# Patient Record
Sex: FEMALE | Race: WHITE | Employment: OTHER | URBAN - METROPOLITAN AREA
[De-identification: names, ages, dates, MRNs, and addresses within clinical notes are randomized per-mention and may not be internally consistent; named-entity substitution may affect disease eponyms.]

---

## 2017-01-09 ENCOUNTER — OFFICE VISIT (OUTPATIENT)
Dept: FAMILY MEDICINE CLINIC | Age: 62
End: 2017-01-09

## 2017-01-09 VITALS
HEART RATE: 101 BPM | SYSTOLIC BLOOD PRESSURE: 140 MMHG | OXYGEN SATURATION: 98 % | WEIGHT: 169.4 LBS | RESPIRATION RATE: 16 BRPM | HEIGHT: 61 IN | BODY MASS INDEX: 31.98 KG/M2 | DIASTOLIC BLOOD PRESSURE: 80 MMHG

## 2017-01-09 DIAGNOSIS — I83.90 VARICOSITIES OF LEG: ICD-10-CM

## 2017-01-09 DIAGNOSIS — E11.9 TYPE 2 DIABETES MELLITUS WITHOUT COMPLICATION, UNSPECIFIED LONG TERM INSULIN USE STATUS: Primary | ICD-10-CM

## 2017-01-09 DIAGNOSIS — M79.604 PAIN IN BOTH LOWER EXTREMITIES: ICD-10-CM

## 2017-01-09 DIAGNOSIS — K26.9 DUODENAL ULCER: ICD-10-CM

## 2017-01-09 DIAGNOSIS — M79.605 PAIN IN BOTH LOWER EXTREMITIES: ICD-10-CM

## 2017-01-09 LAB — HBA1C MFR BLD: 6.6 %

## 2017-01-09 PROCEDURE — 99214 OFFICE O/P EST MOD 30 MIN: CPT | Performed by: NURSE PRACTITIONER

## 2017-01-09 PROCEDURE — 83036 HEMOGLOBIN GLYCOSYLATED A1C: CPT | Performed by: NURSE PRACTITIONER

## 2017-01-09 RX ORDER — LANCETS 33 GAUGE
EACH MISCELLANEOUS
Qty: 60 EACH | Refills: 12 | Status: SHIPPED | OUTPATIENT
Start: 2017-01-09 | End: 2017-10-16 | Stop reason: ALTCHOICE

## 2017-01-11 ENCOUNTER — TELEPHONE (OUTPATIENT)
Dept: FAMILY MEDICINE CLINIC | Age: 62
End: 2017-01-11

## 2017-01-11 ENCOUNTER — NURSE ONLY (OUTPATIENT)
Dept: FAMILY MEDICINE CLINIC | Age: 62
End: 2017-01-11

## 2017-01-11 DIAGNOSIS — E11.9 TYPE 2 DIABETES MELLITUS WITHOUT COMPLICATION, UNSPECIFIED LONG TERM INSULIN USE STATUS: Primary | ICD-10-CM

## 2017-01-11 DIAGNOSIS — E11.9 TYPE 2 DIABETES MELLITUS WITHOUT COMPLICATION, UNSPECIFIED LONG TERM INSULIN USE STATUS: ICD-10-CM

## 2017-01-11 LAB
A/G RATIO: 1.7 (ref 1.1–2.2)
ALBUMIN SERPL-MCNC: 3.8 G/DL (ref 3.4–5)
ALP BLD-CCNC: 76 U/L (ref 40–129)
ALT SERPL-CCNC: 16 U/L (ref 10–40)
ANION GAP SERPL CALCULATED.3IONS-SCNC: 12 MMOL/L (ref 3–16)
AST SERPL-CCNC: 17 U/L (ref 15–37)
BILIRUB SERPL-MCNC: 0.3 MG/DL (ref 0–1)
BUN BLDV-MCNC: 14 MG/DL (ref 7–20)
CALCIUM SERPL-MCNC: 9.1 MG/DL (ref 8.3–10.6)
CHLORIDE BLD-SCNC: 103 MMOL/L (ref 99–110)
CHOLESTEROL, TOTAL: 231 MG/DL (ref 0–199)
CO2: 30 MMOL/L (ref 21–32)
CREAT SERPL-MCNC: 0.6 MG/DL (ref 0.6–1.2)
GFR AFRICAN AMERICAN: >60
GFR NON-AFRICAN AMERICAN: >60
GLOBULIN: 2.2 G/DL
GLUCOSE BLD-MCNC: 79 MG/DL (ref 70–99)
HDLC SERPL-MCNC: 56 MG/DL (ref 40–60)
LDL CHOLESTEROL CALCULATED: 151 MG/DL
POTASSIUM SERPL-SCNC: 4.5 MMOL/L (ref 3.5–5.1)
SODIUM BLD-SCNC: 145 MMOL/L (ref 136–145)
TOTAL PROTEIN: 6 G/DL (ref 6.4–8.2)
TRIGL SERPL-MCNC: 119 MG/DL (ref 0–150)
VLDLC SERPL CALC-MCNC: 24 MG/DL

## 2017-01-11 PROCEDURE — 36415 COLL VENOUS BLD VENIPUNCTURE: CPT | Performed by: NURSE PRACTITIONER

## 2017-01-16 ENCOUNTER — TELEPHONE (OUTPATIENT)
Dept: FAMILY MEDICINE CLINIC | Age: 62
End: 2017-01-16

## 2017-01-16 DIAGNOSIS — E78.5 HYPERLIPIDEMIA, UNSPECIFIED HYPERLIPIDEMIA TYPE: ICD-10-CM

## 2017-01-16 DIAGNOSIS — E11.9 TYPE 2 DIABETES MELLITUS WITHOUT COMPLICATION, UNSPECIFIED LONG TERM INSULIN USE STATUS: Primary | ICD-10-CM

## 2017-01-16 RX ORDER — ATORVASTATIN CALCIUM 40 MG/1
40 TABLET, FILM COATED ORAL DAILY
Qty: 30 TABLET | Refills: 2 | Status: SHIPPED | OUTPATIENT
Start: 2017-01-16 | End: 2017-04-17

## 2017-01-17 ENCOUNTER — NURSE ONLY (OUTPATIENT)
Dept: FAMILY MEDICINE CLINIC | Age: 62
End: 2017-01-17

## 2017-01-17 ENCOUNTER — TELEPHONE (OUTPATIENT)
Dept: FAMILY MEDICINE CLINIC | Age: 62
End: 2017-01-17

## 2017-01-17 DIAGNOSIS — E11.9 TYPE 2 DIABETES MELLITUS WITHOUT COMPLICATION, UNSPECIFIED LONG TERM INSULIN USE STATUS: Primary | ICD-10-CM

## 2017-01-17 LAB — GLUCOSE BLD-MCNC: 98 MG/DL

## 2017-01-17 PROCEDURE — 82962 GLUCOSE BLOOD TEST: CPT | Performed by: FAMILY MEDICINE

## 2017-04-17 ENCOUNTER — OFFICE VISIT (OUTPATIENT)
Dept: FAMILY MEDICINE CLINIC | Age: 62
End: 2017-04-17

## 2017-04-17 VITALS
DIASTOLIC BLOOD PRESSURE: 62 MMHG | HEART RATE: 64 BPM | BODY MASS INDEX: 32.28 KG/M2 | HEIGHT: 61 IN | OXYGEN SATURATION: 99 % | RESPIRATION RATE: 16 BRPM | WEIGHT: 171 LBS | SYSTOLIC BLOOD PRESSURE: 122 MMHG

## 2017-04-17 DIAGNOSIS — M79.604 PAIN IN BOTH LOWER EXTREMITIES: ICD-10-CM

## 2017-04-17 DIAGNOSIS — I70.0 AORTIC ATHEROSCLEROSIS (HCC): ICD-10-CM

## 2017-04-17 DIAGNOSIS — M79.605 PAIN IN BOTH LOWER EXTREMITIES: ICD-10-CM

## 2017-04-17 DIAGNOSIS — E11.9 TYPE 2 DIABETES MELLITUS WITHOUT COMPLICATION, UNSPECIFIED LONG TERM INSULIN USE STATUS: Primary | ICD-10-CM

## 2017-04-17 LAB — HBA1C MFR BLD: 6.4 %

## 2017-04-17 PROCEDURE — 83036 HEMOGLOBIN GLYCOSYLATED A1C: CPT | Performed by: NURSE PRACTITIONER

## 2017-04-17 PROCEDURE — 99214 OFFICE O/P EST MOD 30 MIN: CPT | Performed by: NURSE PRACTITIONER

## 2017-04-17 RX ORDER — GABAPENTIN 300 MG/1
900 CAPSULE ORAL NIGHTLY
Qty: 90 CAPSULE | Refills: 3 | Status: SHIPPED | OUTPATIENT
Start: 2017-04-17 | End: 2017-04-17 | Stop reason: SDUPTHER

## 2017-04-17 RX ORDER — GABAPENTIN 300 MG/1
900 CAPSULE ORAL NIGHTLY
Qty: 90 CAPSULE | Refills: 3 | Status: SHIPPED | OUTPATIENT
Start: 2017-04-17 | End: 2017-12-22 | Stop reason: SDUPTHER

## 2017-10-16 ENCOUNTER — OFFICE VISIT (OUTPATIENT)
Dept: FAMILY MEDICINE CLINIC | Age: 62
End: 2017-10-16

## 2017-10-16 VITALS
DIASTOLIC BLOOD PRESSURE: 72 MMHG | WEIGHT: 175.4 LBS | BODY MASS INDEX: 33.12 KG/M2 | RESPIRATION RATE: 12 BRPM | SYSTOLIC BLOOD PRESSURE: 126 MMHG | HEART RATE: 50 BPM | HEIGHT: 61 IN

## 2017-10-16 DIAGNOSIS — E11.9 TYPE 2 DIABETES MELLITUS WITHOUT COMPLICATION, UNSPECIFIED LONG TERM INSULIN USE STATUS: Primary | ICD-10-CM

## 2017-10-16 DIAGNOSIS — E78.5 HYPERLIPIDEMIA, UNSPECIFIED HYPERLIPIDEMIA TYPE: ICD-10-CM

## 2017-10-16 DIAGNOSIS — I70.0 AORTIC ATHEROSCLEROSIS (HCC): ICD-10-CM

## 2017-10-16 LAB
CREATININE URINE: 20.9 MG/DL (ref 28–259)
HBA1C MFR BLD: 6.2 %
MICROALBUMIN UR-MCNC: <1.2 MG/DL
MICROALBUMIN/CREAT UR-RTO: ABNORMAL MG/G (ref 0–30)

## 2017-10-16 PROCEDURE — 99213 OFFICE O/P EST LOW 20 MIN: CPT | Performed by: NURSE PRACTITIONER

## 2017-10-16 PROCEDURE — 83036 HEMOGLOBIN GLYCOSYLATED A1C: CPT | Performed by: NURSE PRACTITIONER

## 2017-10-16 RX ORDER — ROSUVASTATIN CALCIUM 5 MG/1
5 TABLET, COATED ORAL DAILY
Qty: 30 TABLET | Refills: 5 | Status: SHIPPED | OUTPATIENT
Start: 2017-10-16 | End: 2018-04-11 | Stop reason: SDUPTHER

## 2017-10-16 ASSESSMENT — PATIENT HEALTH QUESTIONNAIRE - PHQ9
SUM OF ALL RESPONSES TO PHQ QUESTIONS 1-9: 1
1. LITTLE INTEREST OR PLEASURE IN DOING THINGS: 0
SUM OF ALL RESPONSES TO PHQ9 QUESTIONS 1 & 2: 1
2. FEELING DOWN, DEPRESSED OR HOPELESS: 1

## 2017-10-16 NOTE — PROGRESS NOTES
Nichole Oconnor  58 y.o. female    1955      CC: Diabetes follow up    Chief Complaint   Patient presents with    Diabetes     ROUTINE DM FOLLOW UP, MICRO, FOOT, A1C, AND [facility] TODAY. HPI     Diabetes Mellitus Type II: Current symptoms/problems include numbness and tingling in hands - none in feet. This continues. Worse with hands are elevated. 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 No worse than before. Not any better. Does better if shakes the hands. Legs hurt all the time. Inside legs is where her pain is through the thigh, as well. Gabapentin and tylenol seems to help the best for the leg pain 1 in am and 1 in early evening and one at night. Cramps and knots, pain in inner thigh. Pain worse through the day. Cold is making it worse. Has tried compression sleeves - does not stay on well. Heat helps.       Has some back pain, but not as consistent as the leg pain. Is worse if overdoes it.        Medication compliance: compliant all of the time - using Basaglar 20 U nightly and rare use of humalog (once or twice)  only if dinner out has to use humalog.  Only had to use once this week - uses when eats out. Uses when has a high number. Lantus was not covered, so switched to the basaglar and doing well.         Diabetic diet compliance: compliant most of the time, rare treats   Weight trend: up 4 lbs. Current exercise: No routine exercise, but always moving. Stays active.   [facility] blood sugar records:  fasting in am.  Only one in 200s - after a dessert - not unexpected. Any episodes of hypoglycemia? no  Eye exam current (within one year): July 2016 - due for one this year. Known diabetic complications: neuropathy      Blood pressure typically not checked outside of the office. Had high numbers today. Patient denies chest pain, shortness of breath. No swelling.       Medication side effects: no medication side effects noted. Ulcer has healed in the stomach.   Has to watch with roughage. Can irritate the ulcerated area.        Hyperlipidemia- past  - not on statin. Has Aortic Atherosclerosis. Lipitor upset her stomach - has not been on anything else. Lab Results   Component Value Date    LABA1C 6.2 10/16/2017    LABA1C 6.4 04/17/2017    LABA1C 6.6 01/09/2017     Lab Results   Component Value Date    LABMICR <1.20 03/21/2016    CREATININE 0.6 01/11/2017     Lab Results   Component Value Date    ALT 16 01/11/2017    AST 17 01/11/2017     No components found for: CHLPL  Lab Results   Component Value Date    TRIG 119 01/11/2017     Lab Results   Component Value Date    HDL 56 01/11/2017     Lab Results   Component Value Date    LDLCALC 151 01/11/2017       No Known Allergies    Physical  Examination    Physical Exam   Constitutional: She is oriented to person, place, and time and well-developed, well-nourished, and in no distress. No distress. HENT:   Head: Normocephalic. Right Ear: External ear normal.   Left Ear: External ear normal.   Nose: Nose normal.   Mouth/Throat: Oropharynx is clear and moist.   Cardiovascular: Normal rate and regular rhythm. Exam reveals no gallop and no friction rub. No murmur heard. Pulmonary/Chest: Effort normal. No respiratory distress. She has no wheezes. She has no rales. Left > right leg swelling. Bilateral pitting edema. Musculoskeletal: She exhibits tenderness (medial thighs with pain). Lymphadenopathy:        Head (right side): No submental, no submandibular and no tonsillar adenopathy present. Head (left side): No submental, no submandibular and no tonsillar adenopathy present. She has no cervical adenopathy. Neurological: She is alert and oriented to person, place, and time. Skin: Skin is warm and dry. She is not diaphoretic. Spider like veins and patches of veins in the legs that are tender to touch over the upper legs and medial thighs.     Psychiatric: Mood and affect normal.   Nursing note and vitals reviewed. Vitals:    10/16/17 1112   BP: 126/72   Site: Left Arm   Position: Sitting   Cuff Size: Medium Adult   Pulse: 50   Resp: 12   Weight: 175 lb 6.4 oz (79.6 kg)   Height: 5' 1\" (1.549 m)     Body mass index is 33.14 kg/m². Wt Readings from Last 3 Encounters:   10/16/17 175 lb 6.4 oz (79.6 kg)   04/17/17 171 lb (77.6 kg)   01/09/17 169 lb 6.4 oz (76.8 kg)     BP Readings from Last 3 Encounters:   10/16/17 126/72   04/17/17 122/62   01/09/17 140/80        Results for POC orders placed in visit on 10/16/17   POCT glycosylated hemoglobin (Hb A1C)   Result Value Ref Range    Hemoglobin A1C 6.2 %       Assessment    1. Type 2 diabetes mellitus without complication, unspecified long term insulin use status (HCC)  POCT glycosylated hemoglobin (Hb A1C)    HM DIABETES FOOT EXAM    Microalbumin / Creatinine Urine Ratio    rosuvastatin (CRESTOR) 5 MG tablet   2. Hyperlipidemia, unspecified hyperlipidemia type  rosuvastatin (CRESTOR) 5 MG tablet   3. Aortic atherosclerosis (HCC)  rosuvastatin (CRESTOR) 5 MG tablet         Plan    Continue meds at current dosing  Discussed starting a statin due to atherosclerosis, and diabetes  Start Crestor 5 mg. She had GI side effects with Lipitor  Discussed may try a Livalo as low-dose GI side effects but that is branded    Return in about 6 months (around 4/16/2018) for Diabetes. There are no Patient Instructions on file for this visit.

## 2017-12-22 DIAGNOSIS — M79.605 PAIN IN BOTH LOWER EXTREMITIES: ICD-10-CM

## 2017-12-22 DIAGNOSIS — M79.604 PAIN IN BOTH LOWER EXTREMITIES: ICD-10-CM

## 2017-12-22 RX ORDER — GABAPENTIN 300 MG/1
CAPSULE ORAL
Qty: 90 CAPSULE | Refills: 2 | Status: SHIPPED | OUTPATIENT
Start: 2017-12-22 | End: 2018-03-26 | Stop reason: SDUPTHER

## 2018-03-26 DIAGNOSIS — M79.604 PAIN IN BOTH LOWER EXTREMITIES: ICD-10-CM

## 2018-03-26 DIAGNOSIS — M79.605 PAIN IN BOTH LOWER EXTREMITIES: ICD-10-CM

## 2018-03-28 RX ORDER — GABAPENTIN 300 MG/1
CAPSULE ORAL
Qty: 90 CAPSULE | Refills: 1 | Status: SHIPPED | OUTPATIENT
Start: 2018-03-28 | End: 2018-05-29 | Stop reason: SDUPTHER

## 2018-04-11 DIAGNOSIS — E11.9 TYPE 2 DIABETES MELLITUS WITHOUT COMPLICATION, UNSPECIFIED LONG TERM INSULIN USE STATUS: ICD-10-CM

## 2018-04-11 DIAGNOSIS — E78.5 HYPERLIPIDEMIA, UNSPECIFIED HYPERLIPIDEMIA TYPE: ICD-10-CM

## 2018-04-11 DIAGNOSIS — I70.0 AORTIC ATHEROSCLEROSIS (HCC): ICD-10-CM

## 2018-04-12 RX ORDER — ROSUVASTATIN CALCIUM 5 MG/1
TABLET, COATED ORAL
Qty: 30 TABLET | Refills: 4 | Status: SHIPPED | OUTPATIENT
Start: 2018-04-12 | End: 2018-09-08 | Stop reason: SDUPTHER

## 2018-04-16 ENCOUNTER — OFFICE VISIT (OUTPATIENT)
Dept: FAMILY MEDICINE CLINIC | Age: 63
End: 2018-04-16

## 2018-04-16 VITALS
RESPIRATION RATE: 14 BRPM | HEIGHT: 61 IN | SYSTOLIC BLOOD PRESSURE: 128 MMHG | HEART RATE: 80 BPM | WEIGHT: 179.8 LBS | DIASTOLIC BLOOD PRESSURE: 76 MMHG | BODY MASS INDEX: 33.95 KG/M2

## 2018-04-16 DIAGNOSIS — E11.9 TYPE 2 DIABETES MELLITUS WITHOUT COMPLICATION, UNSPECIFIED LONG TERM INSULIN USE STATUS: Primary | ICD-10-CM

## 2018-04-16 DIAGNOSIS — Z11.59 NEED FOR HEPATITIS C SCREENING TEST: ICD-10-CM

## 2018-04-16 DIAGNOSIS — Z11.4 ENCOUNTER FOR SCREENING FOR HIV: ICD-10-CM

## 2018-04-16 DIAGNOSIS — E78.49 OTHER HYPERLIPIDEMIA: ICD-10-CM

## 2018-04-16 DIAGNOSIS — G56.03 BILATERAL CARPAL TUNNEL SYNDROME: ICD-10-CM

## 2018-04-16 DIAGNOSIS — I70.0 AORTIC ATHEROSCLEROSIS (HCC): ICD-10-CM

## 2018-04-16 LAB
A/G RATIO: 2 (ref 1.1–2.2)
ALBUMIN SERPL-MCNC: 4.2 G/DL (ref 3.4–5)
ALP BLD-CCNC: 77 U/L (ref 40–129)
ALT SERPL-CCNC: 13 U/L (ref 10–40)
ANION GAP SERPL CALCULATED.3IONS-SCNC: 12 MMOL/L (ref 3–16)
AST SERPL-CCNC: 17 U/L (ref 15–37)
BILIRUB SERPL-MCNC: 0.4 MG/DL (ref 0–1)
BUN BLDV-MCNC: 11 MG/DL (ref 7–20)
CALCIUM SERPL-MCNC: 9.6 MG/DL (ref 8.3–10.6)
CHLORIDE BLD-SCNC: 103 MMOL/L (ref 99–110)
CHOLESTEROL, TOTAL: 181 MG/DL (ref 0–199)
CO2: 29 MMOL/L (ref 21–32)
CREAT SERPL-MCNC: <0.5 MG/DL (ref 0.6–1.2)
CREATININE URINE: 104.9 MG/DL (ref 28–259)
GFR AFRICAN AMERICAN: >60
GFR NON-AFRICAN AMERICAN: >60
GLOBULIN: 2.1 G/DL
GLUCOSE BLD-MCNC: 97 MG/DL (ref 70–99)
HBA1C MFR BLD: 6.8 %
HDLC SERPL-MCNC: 56 MG/DL (ref 40–60)
HEPATITIS C ANTIBODY INTERPRETATION: NORMAL
LDL CHOLESTEROL CALCULATED: 101 MG/DL
MICROALBUMIN UR-MCNC: <1.2 MG/DL
MICROALBUMIN/CREAT UR-RTO: NORMAL MG/G (ref 0–30)
POTASSIUM SERPL-SCNC: 4.7 MMOL/L (ref 3.5–5.1)
SODIUM BLD-SCNC: 144 MMOL/L (ref 136–145)
TOTAL PROTEIN: 6.3 G/DL (ref 6.4–8.2)
TRIGL SERPL-MCNC: 120 MG/DL (ref 0–150)
VLDLC SERPL CALC-MCNC: 24 MG/DL

## 2018-04-16 PROCEDURE — 99214 OFFICE O/P EST MOD 30 MIN: CPT | Performed by: NURSE PRACTITIONER

## 2018-04-16 PROCEDURE — 1036F TOBACCO NON-USER: CPT | Performed by: NURSE PRACTITIONER

## 2018-04-16 PROCEDURE — 83036 HEMOGLOBIN GLYCOSYLATED A1C: CPT | Performed by: NURSE PRACTITIONER

## 2018-04-16 PROCEDURE — G8427 DOCREV CUR MEDS BY ELIG CLIN: HCPCS | Performed by: NURSE PRACTITIONER

## 2018-04-16 PROCEDURE — 3044F HG A1C LEVEL LT 7.0%: CPT | Performed by: NURSE PRACTITIONER

## 2018-04-16 PROCEDURE — G8417 CALC BMI ABV UP PARAM F/U: HCPCS | Performed by: NURSE PRACTITIONER

## 2018-04-16 PROCEDURE — G8599 NO ASA/ANTIPLAT THER USE RNG: HCPCS | Performed by: NURSE PRACTITIONER

## 2018-04-16 PROCEDURE — 3017F COLORECTAL CA SCREEN DOC REV: CPT | Performed by: NURSE PRACTITIONER

## 2018-04-16 PROCEDURE — 2022F DILAT RTA XM EVC RTNOPTHY: CPT | Performed by: NURSE PRACTITIONER

## 2018-04-16 PROCEDURE — 36415 COLL VENOUS BLD VENIPUNCTURE: CPT | Performed by: NURSE PRACTITIONER

## 2018-04-16 PROCEDURE — 3014F SCREEN MAMMO DOC REV: CPT | Performed by: NURSE PRACTITIONER

## 2018-04-17 LAB
HIV AG/AB: NORMAL
HIV ANTIGEN: NORMAL
HIV-1 ANTIBODY: NORMAL
HIV-2 AB: NORMAL

## 2018-05-29 DIAGNOSIS — M79.604 PAIN IN BOTH LOWER EXTREMITIES: ICD-10-CM

## 2018-05-29 DIAGNOSIS — M79.605 PAIN IN BOTH LOWER EXTREMITIES: ICD-10-CM

## 2018-05-29 RX ORDER — GABAPENTIN 300 MG/1
CAPSULE ORAL
Qty: 90 CAPSULE | Refills: 5 | Status: SHIPPED | OUTPATIENT
Start: 2018-05-29 | End: 2018-11-25 | Stop reason: SDUPTHER

## 2018-09-08 DIAGNOSIS — E78.5 HYPERLIPIDEMIA, UNSPECIFIED HYPERLIPIDEMIA TYPE: ICD-10-CM

## 2018-09-08 DIAGNOSIS — E11.9 TYPE 2 DIABETES MELLITUS WITHOUT COMPLICATION (HCC): ICD-10-CM

## 2018-09-08 DIAGNOSIS — I70.0 AORTIC ATHEROSCLEROSIS (HCC): ICD-10-CM

## 2018-09-10 RX ORDER — ROSUVASTATIN CALCIUM 5 MG/1
TABLET, COATED ORAL
Qty: 30 TABLET | Refills: 3 | Status: SHIPPED | OUTPATIENT
Start: 2018-09-10 | End: 2019-01-03 | Stop reason: SDUPTHER

## 2018-10-15 ENCOUNTER — OFFICE VISIT (OUTPATIENT)
Dept: FAMILY MEDICINE CLINIC | Age: 63
End: 2018-10-15
Payer: COMMERCIAL

## 2018-10-15 VITALS
BODY MASS INDEX: 32.28 KG/M2 | HEART RATE: 86 BPM | RESPIRATION RATE: 16 BRPM | SYSTOLIC BLOOD PRESSURE: 136 MMHG | DIASTOLIC BLOOD PRESSURE: 82 MMHG | HEIGHT: 61 IN | WEIGHT: 171 LBS

## 2018-10-15 DIAGNOSIS — E11.9 TYPE 2 DIABETES MELLITUS WITHOUT COMPLICATION, UNSPECIFIED WHETHER LONG TERM INSULIN USE (HCC): Primary | ICD-10-CM

## 2018-10-15 DIAGNOSIS — K26.9 DUODENAL ULCER: ICD-10-CM

## 2018-10-15 LAB — HBA1C MFR BLD: 6.6 %

## 2018-10-15 PROCEDURE — G8427 DOCREV CUR MEDS BY ELIG CLIN: HCPCS | Performed by: NURSE PRACTITIONER

## 2018-10-15 PROCEDURE — 3044F HG A1C LEVEL LT 7.0%: CPT | Performed by: NURSE PRACTITIONER

## 2018-10-15 PROCEDURE — 83036 HEMOGLOBIN GLYCOSYLATED A1C: CPT | Performed by: NURSE PRACTITIONER

## 2018-10-15 PROCEDURE — 2022F DILAT RTA XM EVC RTNOPTHY: CPT | Performed by: NURSE PRACTITIONER

## 2018-10-15 PROCEDURE — G8599 NO ASA/ANTIPLAT THER USE RNG: HCPCS | Performed by: NURSE PRACTITIONER

## 2018-10-15 PROCEDURE — G8484 FLU IMMUNIZE NO ADMIN: HCPCS | Performed by: NURSE PRACTITIONER

## 2018-10-15 PROCEDURE — 3017F COLORECTAL CA SCREEN DOC REV: CPT | Performed by: NURSE PRACTITIONER

## 2018-10-15 PROCEDURE — G8417 CALC BMI ABV UP PARAM F/U: HCPCS | Performed by: NURSE PRACTITIONER

## 2018-10-15 PROCEDURE — 99213 OFFICE O/P EST LOW 20 MIN: CPT | Performed by: NURSE PRACTITIONER

## 2018-10-15 PROCEDURE — 1036F TOBACCO NON-USER: CPT | Performed by: NURSE PRACTITIONER

## 2018-10-15 ASSESSMENT — PATIENT HEALTH QUESTIONNAIRE - PHQ9
1. LITTLE INTEREST OR PLEASURE IN DOING THINGS: 0
SUM OF ALL RESPONSES TO PHQ QUESTIONS 1-9: 1
2. FEELING DOWN, DEPRESSED OR HOPELESS: 1
SUM OF ALL RESPONSES TO PHQ QUESTIONS 1-9: 1
SUM OF ALL RESPONSES TO PHQ9 QUESTIONS 1 & 2: 1
DEPRESSION UNABLE TO ASSESS: URGENT/EMERGENT SITUATION

## 2018-11-25 DIAGNOSIS — M79.605 PAIN IN BOTH LOWER EXTREMITIES: ICD-10-CM

## 2018-11-25 DIAGNOSIS — M79.604 PAIN IN BOTH LOWER EXTREMITIES: ICD-10-CM

## 2018-11-26 RX ORDER — GABAPENTIN 300 MG/1
CAPSULE ORAL
Qty: 90 CAPSULE | Refills: 1 | Status: SHIPPED | OUTPATIENT
Start: 2018-11-26 | End: 2019-01-22 | Stop reason: SDUPTHER

## 2019-01-03 DIAGNOSIS — E78.5 HYPERLIPIDEMIA, UNSPECIFIED HYPERLIPIDEMIA TYPE: ICD-10-CM

## 2019-01-03 DIAGNOSIS — E11.9 TYPE 2 DIABETES MELLITUS WITHOUT COMPLICATION (HCC): ICD-10-CM

## 2019-01-03 DIAGNOSIS — I70.0 AORTIC ATHEROSCLEROSIS (HCC): ICD-10-CM

## 2019-01-03 RX ORDER — ROSUVASTATIN CALCIUM 5 MG/1
TABLET, COATED ORAL
Qty: 30 TABLET | Refills: 3 | Status: SHIPPED | OUTPATIENT
Start: 2019-01-03 | End: 2019-04-15 | Stop reason: SDUPTHER

## 2019-01-22 DIAGNOSIS — M79.604 PAIN IN BOTH LOWER EXTREMITIES: ICD-10-CM

## 2019-01-22 DIAGNOSIS — M79.605 PAIN IN BOTH LOWER EXTREMITIES: ICD-10-CM

## 2019-01-22 RX ORDER — GABAPENTIN 300 MG/1
CAPSULE ORAL
Qty: 90 CAPSULE | Refills: 0 | Status: SHIPPED | OUTPATIENT
Start: 2019-01-25 | End: 2019-02-23 | Stop reason: SDUPTHER

## 2019-02-23 DIAGNOSIS — M79.604 PAIN IN BOTH LOWER EXTREMITIES: ICD-10-CM

## 2019-02-23 DIAGNOSIS — M79.605 PAIN IN BOTH LOWER EXTREMITIES: ICD-10-CM

## 2019-02-25 RX ORDER — GABAPENTIN 300 MG/1
CAPSULE ORAL
Qty: 90 CAPSULE | Refills: 0 | Status: SHIPPED | OUTPATIENT
Start: 2019-02-25 | End: 2019-03-21 | Stop reason: SDUPTHER

## 2019-03-21 DIAGNOSIS — M79.604 PAIN IN BOTH LOWER EXTREMITIES: ICD-10-CM

## 2019-03-21 DIAGNOSIS — M79.605 PAIN IN BOTH LOWER EXTREMITIES: ICD-10-CM

## 2019-03-21 RX ORDER — GABAPENTIN 300 MG/1
CAPSULE ORAL
Qty: 90 CAPSULE | Refills: 0 | Status: SHIPPED | OUTPATIENT
Start: 2019-03-28 | End: 2019-04-15 | Stop reason: SDUPTHER

## 2019-04-15 ENCOUNTER — OFFICE VISIT (OUTPATIENT)
Dept: FAMILY MEDICINE CLINIC | Age: 64
End: 2019-04-15
Payer: COMMERCIAL

## 2019-04-15 VITALS
RESPIRATION RATE: 16 BRPM | OXYGEN SATURATION: 99 % | HEIGHT: 61 IN | WEIGHT: 173.4 LBS | HEART RATE: 82 BPM | BODY MASS INDEX: 32.74 KG/M2 | DIASTOLIC BLOOD PRESSURE: 62 MMHG | SYSTOLIC BLOOD PRESSURE: 130 MMHG | TEMPERATURE: 98.4 F

## 2019-04-15 DIAGNOSIS — M65.332 TRIGGER FINGER, LEFT MIDDLE FINGER: Primary | ICD-10-CM

## 2019-04-15 DIAGNOSIS — E11.9 TYPE 2 DIABETES MELLITUS WITHOUT COMPLICATION, WITH LONG-TERM CURRENT USE OF INSULIN (HCC): ICD-10-CM

## 2019-04-15 DIAGNOSIS — I83.813 VARICOSE VEINS OF BOTH LOWER EXTREMITIES WITH PAIN: ICD-10-CM

## 2019-04-15 DIAGNOSIS — Z79.4 TYPE 2 DIABETES MELLITUS WITHOUT COMPLICATION, WITH LONG-TERM CURRENT USE OF INSULIN (HCC): ICD-10-CM

## 2019-04-15 DIAGNOSIS — E78.5 HYPERLIPIDEMIA, UNSPECIFIED HYPERLIPIDEMIA TYPE: ICD-10-CM

## 2019-04-15 DIAGNOSIS — I70.0 AORTIC ATHEROSCLEROSIS (HCC): ICD-10-CM

## 2019-04-15 DIAGNOSIS — M79.605 PAIN IN BOTH LOWER EXTREMITIES: ICD-10-CM

## 2019-04-15 DIAGNOSIS — M79.604 PAIN IN BOTH LOWER EXTREMITIES: ICD-10-CM

## 2019-04-15 LAB
A/G RATIO: 1.4 (ref 1.1–2.2)
ALBUMIN SERPL-MCNC: 3.9 G/DL (ref 3.4–5)
ALP BLD-CCNC: 82 U/L (ref 40–129)
ALT SERPL-CCNC: 12 U/L (ref 10–40)
ANION GAP SERPL CALCULATED.3IONS-SCNC: 13 MMOL/L (ref 3–16)
AST SERPL-CCNC: 16 U/L (ref 15–37)
BILIRUB SERPL-MCNC: 0.3 MG/DL (ref 0–1)
BUN BLDV-MCNC: 12 MG/DL (ref 7–20)
CALCIUM SERPL-MCNC: 9.5 MG/DL (ref 8.3–10.6)
CHLORIDE BLD-SCNC: 107 MMOL/L (ref 99–110)
CHOLESTEROL, TOTAL: 185 MG/DL (ref 0–199)
CO2: 27 MMOL/L (ref 21–32)
CREAT SERPL-MCNC: 0.5 MG/DL (ref 0.6–1.2)
CREATININE URINE POCT: NORMAL
GFR AFRICAN AMERICAN: >60
GFR NON-AFRICAN AMERICAN: >60
GLOBULIN: 2.7 G/DL
GLUCOSE BLD-MCNC: 92 MG/DL (ref 70–99)
HBA1C MFR BLD: 6.7 %
HDLC SERPL-MCNC: 55 MG/DL (ref 40–60)
LDL CHOLESTEROL CALCULATED: 111 MG/DL
MICROALBUMIN/CREAT 24H UR: NORMAL MG/G{CREAT}
MICROALBUMIN/CREAT UR-RTO: NORMAL
POTASSIUM SERPL-SCNC: 4.9 MMOL/L (ref 3.5–5.1)
SODIUM BLD-SCNC: 147 MMOL/L (ref 136–145)
TOTAL PROTEIN: 6.6 G/DL (ref 6.4–8.2)
TRIGL SERPL-MCNC: 96 MG/DL (ref 0–150)
VLDLC SERPL CALC-MCNC: 19 MG/DL

## 2019-04-15 PROCEDURE — 36415 COLL VENOUS BLD VENIPUNCTURE: CPT | Performed by: NURSE PRACTITIONER

## 2019-04-15 PROCEDURE — 3017F COLORECTAL CA SCREEN DOC REV: CPT | Performed by: NURSE PRACTITIONER

## 2019-04-15 PROCEDURE — 3044F HG A1C LEVEL LT 7.0%: CPT | Performed by: NURSE PRACTITIONER

## 2019-04-15 PROCEDURE — 20552 NJX 1/MLT TRIGGER POINT 1/2: CPT | Performed by: NURSE PRACTITIONER

## 2019-04-15 PROCEDURE — 83036 HEMOGLOBIN GLYCOSYLATED A1C: CPT | Performed by: NURSE PRACTITIONER

## 2019-04-15 PROCEDURE — G8417 CALC BMI ABV UP PARAM F/U: HCPCS | Performed by: NURSE PRACTITIONER

## 2019-04-15 PROCEDURE — G8427 DOCREV CUR MEDS BY ELIG CLIN: HCPCS | Performed by: NURSE PRACTITIONER

## 2019-04-15 PROCEDURE — 2022F DILAT RTA XM EVC RTNOPTHY: CPT | Performed by: NURSE PRACTITIONER

## 2019-04-15 PROCEDURE — 82044 UR ALBUMIN SEMIQUANTITATIVE: CPT | Performed by: NURSE PRACTITIONER

## 2019-04-15 PROCEDURE — 1036F TOBACCO NON-USER: CPT | Performed by: NURSE PRACTITIONER

## 2019-04-15 PROCEDURE — 99214 OFFICE O/P EST MOD 30 MIN: CPT | Performed by: NURSE PRACTITIONER

## 2019-04-15 PROCEDURE — G8599 NO ASA/ANTIPLAT THER USE RNG: HCPCS | Performed by: NURSE PRACTITIONER

## 2019-04-15 RX ORDER — METHYLPREDNISOLONE ACETATE 40 MG/ML
20 INJECTION, SUSPENSION INTRA-ARTICULAR; INTRALESIONAL; INTRAMUSCULAR; SOFT TISSUE ONCE
Status: COMPLETED | OUTPATIENT
Start: 2019-04-15 | End: 2019-04-15

## 2019-04-15 RX ORDER — GABAPENTIN 300 MG/1
CAPSULE ORAL
Qty: 90 CAPSULE | Refills: 5 | Status: SHIPPED | OUTPATIENT
Start: 2019-04-15 | End: 2019-10-14 | Stop reason: SDUPTHER

## 2019-04-15 RX ORDER — ROSUVASTATIN CALCIUM 10 MG/1
10 TABLET, COATED ORAL DAILY
Qty: 90 TABLET | Refills: 3 | Status: SHIPPED | OUTPATIENT
Start: 2019-04-15 | End: 2019-04-17 | Stop reason: SINTOL

## 2019-04-15 RX ORDER — ROSUVASTATIN CALCIUM 5 MG/1
TABLET, COATED ORAL
Qty: 30 TABLET | Refills: 11 | Status: SHIPPED | OUTPATIENT
Start: 2019-04-15 | End: 2019-04-15 | Stop reason: ALTCHOICE

## 2019-04-15 RX ADMIN — METHYLPREDNISOLONE ACETATE 20 MG: 40 INJECTION, SUSPENSION INTRA-ARTICULAR; INTRALESIONAL; INTRAMUSCULAR; SOFT TISSUE at 17:31

## 2019-04-15 ASSESSMENT — PATIENT HEALTH QUESTIONNAIRE - PHQ9
SUM OF ALL RESPONSES TO PHQ QUESTIONS 1-9: 0
1. LITTLE INTEREST OR PLEASURE IN DOING THINGS: 0
SUM OF ALL RESPONSES TO PHQ QUESTIONS 1-9: 0
2. FEELING DOWN, DEPRESSED OR HOPELESS: 0
SUM OF ALL RESPONSES TO PHQ9 QUESTIONS 1 & 2: 0

## 2019-04-15 NOTE — PROGRESS NOTES
Aldo   61 y.o. female    1955      CC: 6 months follow up DM and HTN. Chief Complaint   Patient presents with    Hypertension     6 MONTH F/U DM AND HTN    Diabetes     HPI     Diabetes Mellitus Type II: Current symptoms/problems include none. BASAGLAR 20  Has humalog - once a week as needed for higher sugars. Usually 2 units and occasionally 4 units. 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 Medication compliance:  compliant all of the time  Diabetic diet compliance:  consisten and watches carbs and sugar. ,  Weight trend: stable  Current exercise: none - is never still, though. Active lisfestyle. Home blood sugar records: am and pm - Running in normal ranges.  in am, and pm 139 or lower. Any episodes of hypoglycemia? no  Eye exam current (within one year): yes  - July or Aug [facility]. Known diabetic complications: peripheral vascular disease    Hypertension:  Blood pressure typically runs normal outside of the office. Borderline systolic today. Patient denies chest pain, shortness of breath, peripheral edema. Medication side effects: no medication side effects noted. Hyperlipidemia:  Crestor. Takes the gabapentin for varicose vein pain. Has knots in the veins. Handicap placard for car - weather hurts it and wind hurts it. Feels like in a vice and painful in the legs. Hard to walk. FH - thyroid - mom and sister - one is hypo and one is hyper.         Lab Results   Component Value Date    LABA1C 6.7 04/15/2019    LABA1C 6.6 10/15/2018    LABA1C 6.8 04/16/2018     Lab Results   Component Value Date    LABMICR <1.20 04/16/2018    CREATININE 0.5 (L) 04/15/2019     Lab Results   Component Value Date    ALT 12 04/15/2019    AST 16 04/15/2019     No components found for: CHLPL  Lab Results   Component Value Date    TRIG 96 04/15/2019     Lab Results   Component Value Date    HDL 55 04/15/2019     Lab Results Component Value Date    LDLCALC 111 04/15/2019         No Known Allergies    Physical  Examination    Physical Exam   Constitutional: She is oriented to person, place, and time. She appears well-developed and well-nourished. No distress. HENT:   Head: Normocephalic. Cardiovascular: Normal rate and regular rhythm. Exam reveals no gallop and no friction rub. No murmur heard. Pulmonary/Chest: Effort normal. No accessory muscle usage. No respiratory distress. She has no decreased breath sounds. She has no wheezes. She has no rhonchi. She has no rales. Abdominal: Soft. Bowel sounds are normal. She exhibits no distension. There is no tenderness. There is no guarding. Musculoskeletal: She exhibits tenderness (left milddle finger with triggering. Pain in entire hand). Neurological: She is alert and oriented to person, place, and time. Skin: Skin is warm and dry. She is not diaphoretic. Psychiatric: She has a normal mood and affect. Her behavior is normal. Judgment and thought content normal.   Nursing note and vitals reviewed. Vitals:    04/15/19 1117   BP: 130/62   Site: Left Upper Arm   Position: Sitting   Cuff Size: Medium Adult   Pulse: 82   Resp: 16   Temp: 98.4 °F (36.9 °C)   TempSrc: Oral   SpO2: 99%   Weight: 173 lb 6.4 oz (78.7 kg)   Height: 5' 1\" (1.549 m)     Body mass index is 32.76 kg/m². Wt Readings from Last 3 Encounters:   04/15/19 173 lb 6.4 oz (78.7 kg)   10/15/18 171 lb (77.6 kg)   04/16/18 179 lb 12.8 oz (81.6 kg)     BP Readings from Last 3 Encounters:   04/15/19 130/62   10/15/18 136/82   04/16/18 128/76        Results for POC orders placed in visit on 04/15/19   POCT microalbumin   Result Value Ref Range    Microalb, Ur 30 MG/L     Creatinine Ur POCT 300 MG/DL     Microalbumin Creatinine Ratio <30 MG/G    POCT glycosylated hemoglobin (Hb A1C)   Result Value Ref Range    Hemoglobin A1C 6.7 %       Assessment     Diagnosis Orders   1.  Trigger finger, left middle finger

## 2019-04-15 NOTE — PROGRESS NOTES
Administrations This Visit     methylPREDNISolone acetate (DEPO-MEDROL) injection 20 mg     Admin Date  04/15/2019  17:31 Action  Given Dose  20 mg Route  Intramuscular Site  Finger (See Comments) Administered By  Yimi Guerrero MA    Ordering Provider:  HERIBERTO Mohr CNP    NDC:  2626-3706-81    Lot#:   [id number]    :  [address].     Patient Supplied?:  No    Comments:  Admin Instructions:SITE:  RIGHT MIDDLE TRIGGER FINGERNDC#:  8827-3825-37HVD#:  [id number]:  06/01/2019VERIFIED BY: [facility]

## 2019-04-15 NOTE — LETTER
[address] [facility] [address]  Phone: [phone]  Fax: [phone] [facility], APRN - [facility]        April 15, 2019     Patient: Erasto Mendoza   YOB: 1955   Date of Visit: 4/15/2019       To Whom It May Concern: It is my medical opinion that Erasto Mendoza requires a disability parking placard for the following reasons:  She has limited walking ability due to vascular condition. Duration of need: permanent    If you have any questions or concerns, please don't hesitate to call.     Sincerely,        Bhargavi Ho, HERIBERTO Machuca CNP

## 2019-04-16 ENCOUNTER — TELEPHONE (OUTPATIENT)
Dept: INTERNAL MEDICINE CLINIC | Age: 64
End: 2019-04-16

## 2019-04-16 NOTE — TELEPHONE ENCOUNTER
CALLED PHARMACY, ADVISED. [address] [facility]. PHARMACY WAS CALLING THE PT TO ADVISE AND TO SEE IF THAT WOULD BE OKAY WITH PT. IF OKAY WITH YOU PLEASE SEND IN NEW SCRIPT.

## 2019-04-16 NOTE — TELEPHONE ENCOUNTER
PA SUBMITTED FOR HumaLOG KwikPen 100UNIT/ML SC SOPN  Key: [facility] - Rx #: [id number]   STATUS: Please advise the dispensing pharmacy to contact the [address] at [phone] for assistance.

## 2019-04-17 DIAGNOSIS — I70.0 AORTIC ATHEROSCLEROSIS (HCC): ICD-10-CM

## 2019-04-17 DIAGNOSIS — Z79.4 TYPE 2 DIABETES MELLITUS WITHOUT COMPLICATION, WITH LONG-TERM CURRENT USE OF INSULIN (HCC): ICD-10-CM

## 2019-04-17 DIAGNOSIS — E11.9 TYPE 2 DIABETES MELLITUS WITHOUT COMPLICATION, WITH LONG-TERM CURRENT USE OF INSULIN (HCC): ICD-10-CM

## 2019-04-17 DIAGNOSIS — E78.5 HYPERLIPIDEMIA, UNSPECIFIED HYPERLIPIDEMIA TYPE: ICD-10-CM

## 2019-04-17 RX ORDER — ROSUVASTATIN CALCIUM 5 MG/1
TABLET, COATED ORAL
Qty: 30 TABLET | Refills: 11 | Status: SHIPPED | OUTPATIENT
Start: 2019-04-17 | End: 2019-10-14

## 2019-10-14 ENCOUNTER — OFFICE VISIT (OUTPATIENT)
Dept: FAMILY MEDICINE CLINIC | Age: 64
End: 2019-10-14
Payer: COMMERCIAL

## 2019-10-14 VITALS
BODY MASS INDEX: 32.13 KG/M2 | SYSTOLIC BLOOD PRESSURE: 124 MMHG | HEART RATE: 82 BPM | DIASTOLIC BLOOD PRESSURE: 72 MMHG | HEIGHT: 61 IN | WEIGHT: 170.2 LBS | RESPIRATION RATE: 16 BRPM

## 2019-10-14 DIAGNOSIS — I70.0 AORTIC ATHEROSCLEROSIS (HCC): ICD-10-CM

## 2019-10-14 DIAGNOSIS — Z79.4 TYPE 2 DIABETES MELLITUS WITHOUT COMPLICATION, WITH LONG-TERM CURRENT USE OF INSULIN (HCC): Primary | ICD-10-CM

## 2019-10-14 DIAGNOSIS — M79.604 PAIN IN BOTH LOWER EXTREMITIES: ICD-10-CM

## 2019-10-14 DIAGNOSIS — E11.9 TYPE 2 DIABETES MELLITUS WITHOUT COMPLICATION, WITH LONG-TERM CURRENT USE OF INSULIN (HCC): Primary | ICD-10-CM

## 2019-10-14 DIAGNOSIS — E78.2 MIXED HYPERLIPIDEMIA: ICD-10-CM

## 2019-10-14 DIAGNOSIS — M79.605 PAIN IN BOTH LOWER EXTREMITIES: ICD-10-CM

## 2019-10-14 DIAGNOSIS — I83.813 VARICOSE VEINS OF BOTH LOWER EXTREMITIES WITH PAIN: ICD-10-CM

## 2019-10-14 LAB — HBA1C MFR BLD: 6.6 %

## 2019-10-14 PROCEDURE — 3017F COLORECTAL CA SCREEN DOC REV: CPT | Performed by: NURSE PRACTITIONER

## 2019-10-14 PROCEDURE — 3044F HG A1C LEVEL LT 7.0%: CPT | Performed by: NURSE PRACTITIONER

## 2019-10-14 PROCEDURE — G8427 DOCREV CUR MEDS BY ELIG CLIN: HCPCS | Performed by: NURSE PRACTITIONER

## 2019-10-14 PROCEDURE — G8599 NO ASA/ANTIPLAT THER USE RNG: HCPCS | Performed by: NURSE PRACTITIONER

## 2019-10-14 PROCEDURE — 1036F TOBACCO NON-USER: CPT | Performed by: NURSE PRACTITIONER

## 2019-10-14 PROCEDURE — G8484 FLU IMMUNIZE NO ADMIN: HCPCS | Performed by: NURSE PRACTITIONER

## 2019-10-14 PROCEDURE — 2022F DILAT RTA XM EVC RTNOPTHY: CPT | Performed by: NURSE PRACTITIONER

## 2019-10-14 PROCEDURE — G8417 CALC BMI ABV UP PARAM F/U: HCPCS | Performed by: NURSE PRACTITIONER

## 2019-10-14 PROCEDURE — 83036 HEMOGLOBIN GLYCOSYLATED A1C: CPT | Performed by: NURSE PRACTITIONER

## 2019-10-14 PROCEDURE — 99214 OFFICE O/P EST MOD 30 MIN: CPT | Performed by: NURSE PRACTITIONER

## 2019-10-14 RX ORDER — GABAPENTIN 300 MG/1
CAPSULE ORAL
Qty: 90 CAPSULE | Refills: 5 | Status: SHIPPED | OUTPATIENT
Start: 2019-10-14 | End: 2020-04-13 | Stop reason: SDUPTHER

## 2019-10-14 RX ORDER — ROSUVASTATIN CALCIUM 5 MG/1
5 TABLET, COATED ORAL NIGHTLY
Qty: 30 TABLET | Refills: 6 | Status: SHIPPED | OUTPATIENT
Start: 2019-10-14 | End: 2020-04-20 | Stop reason: SDUPTHER

## 2020-01-13 ENCOUNTER — TELEPHONE (OUTPATIENT)
Dept: FAMILY MEDICINE CLINIC | Age: 65
End: 2020-01-13

## 2020-01-13 NOTE — TELEPHONE ENCOUNTER
Patient found out that Lavelle Truong is leaving and would like a call back. Has a few questions she would like to ask her before she leaves.

## 2020-04-13 RX ORDER — GABAPENTIN 300 MG/1
CAPSULE ORAL
Qty: 90 CAPSULE | Refills: 0 | Status: SHIPPED | OUTPATIENT
Start: 2020-04-13 | End: 2020-04-20 | Stop reason: SDUPTHER

## 2020-04-20 ENCOUNTER — VIRTUAL VISIT (OUTPATIENT)
Dept: FAMILY MEDICINE CLINIC | Age: 65
End: 2020-04-20
Payer: COMMERCIAL

## 2020-04-20 VITALS — BODY MASS INDEX: 31.72 KG/M2 | HEIGHT: 61 IN | WEIGHT: 168 LBS

## 2020-04-20 PROCEDURE — 99442 PR PHYS/QHP TELEPHONE EVALUATION 11-20 MIN: CPT | Performed by: NURSE PRACTITIONER

## 2020-04-20 PROCEDURE — G8427 DOCREV CUR MEDS BY ELIG CLIN: HCPCS | Performed by: NURSE PRACTITIONER

## 2020-04-20 PROCEDURE — 3046F HEMOGLOBIN A1C LEVEL >9.0%: CPT | Performed by: NURSE PRACTITIONER

## 2020-04-20 PROCEDURE — 2022F DILAT RTA XM EVC RTNOPTHY: CPT | Performed by: NURSE PRACTITIONER

## 2020-04-20 PROCEDURE — 3017F COLORECTAL CA SCREEN DOC REV: CPT | Performed by: NURSE PRACTITIONER

## 2020-04-20 RX ORDER — ROSUVASTATIN CALCIUM 5 MG/1
5 TABLET, COATED ORAL NIGHTLY
Qty: 30 TABLET | Refills: 1 | Status: SHIPPED | OUTPATIENT
Start: 2020-04-20 | End: 2020-07-06

## 2020-04-20 RX ORDER — INSULIN GLARGINE 100 [IU]/ML
20 INJECTION, SOLUTION SUBCUTANEOUS NIGHTLY
Qty: 5 PEN | Refills: 1 | Status: SHIPPED | OUTPATIENT
Start: 2020-04-20 | End: 2020-10-20 | Stop reason: ALTCHOICE

## 2020-04-20 RX ORDER — GABAPENTIN 300 MG/1
CAPSULE ORAL
Qty: 90 CAPSULE | Refills: 2 | Status: SHIPPED | OUTPATIENT
Start: 2020-04-20 | End: 2020-08-14

## 2020-04-20 ASSESSMENT — PATIENT HEALTH QUESTIONNAIRE - PHQ9
2. FEELING DOWN, DEPRESSED OR HOPELESS: 0
SUM OF ALL RESPONSES TO PHQ9 QUESTIONS 1 & 2: 0
SUM OF ALL RESPONSES TO PHQ QUESTIONS 1-9: 0
SUM OF ALL RESPONSES TO PHQ QUESTIONS 1-9: 0
1. LITTLE INTEREST OR PLEASURE IN DOING THINGS: 0

## 2020-04-20 NOTE — PROGRESS NOTES
2020     Avril Gibbs (:  1955) is a 59 y.o. female, here for evaluation of the following medical concerns:  Avril Gibbs is a 59 y.o. female evaluated via telephone on 2020. Consent:  She and/or health care decision maker is aware that that she may receive a bill for this telephone service, depending on her insurance coverage, and has provided verbal consent to proceed: Yes      Documentation:  I communicated with the patient and/or health care decision maker about diabetes - health maintenance- cholesterol. Details of this discussion including any medical advice provided:     I affirm this is a Patient Initiated Episode with an Established Patient who has not had a related appointment within my department in the past 7 days or scheduled within the next 24 hours. Total Time: minutes: 11-20 minutes          Silvestre MATHUR  Treatment Adherence:   Medication compliance:  compliant all of the time Basaglar 20 units nightly- she callahan not take any pills due to her past hx of ulcers and being afraid they may reoccur  Diet compliance:  compliant most of the time  Current exercise: no regular exercise  Barriers: none    Diabetes Mellitus Type 2: Current symptoms/problems include none. Home blood sugar records: fasting range: 110-120  evening glucose 140 or less  Any episodes of hypoglycemia? no  Eye exam current (within one year): no  Tobacco history: She  reports that she has never smoked. She has never used smokeless tobacco.   Daily Aspirin? No: declined - past med hx of duodenal ulcer     Pain from varicose veins  - aching pain worse at night Gabapentin helps tke the edge off     Hyperlipidemia:  No new myalgias or GI upset on rosuvastatin (Crestor).        Lab Results   Component Value Date    LABA1C 6.6 10/14/2019    LABA1C 6.7 04/15/2019    LABA1C 6.6 10/15/2018     Lab Results   Component Value Date    LABMICR <1.20 2018    CREATININE 0.5 (L) 04/15/2019 Attention and Perception: Attention and perception normal.         Mood and Affect: Mood normal.         Speech: Speech normal.         Behavior: Behavior is cooperative. Thought Content: Thought content normal.         Cognition and Memory: Cognition and memory normal.       Steve Saldana was seen today for diabetes.     Diagnoses and all orders for this visit:    Type 2 diabetes mellitus without complication, with long-term current use of insulin (HCC)  Continue Basaglar 20 units nightly  Continue monitoring blood glucose levels  Schedule eye exam ASAP - states her insurance only allows one every other year  Follow up when office opens    Hyperlipidemia, unspecified hyperlipidemia type  Continue Crestor    Pain in both lower extremities  -     gabapentin (NEURONTIN) 300 MG capsule; TAKE THREE CAPSULES BY MOUTH ONCE NIGHTLY

## 2020-07-06 RX ORDER — ROSUVASTATIN CALCIUM 5 MG/1
TABLET, COATED ORAL
Qty: 30 TABLET | Refills: 2 | Status: SHIPPED | OUTPATIENT
Start: 2020-07-06 | End: 2020-08-27 | Stop reason: SDUPTHER

## 2020-08-14 RX ORDER — GABAPENTIN 300 MG/1
CAPSULE ORAL
Qty: 90 CAPSULE | Refills: 1 | Status: SHIPPED | OUTPATIENT
Start: 2020-08-14 | End: 2020-10-13

## 2020-08-27 RX ORDER — ROSUVASTATIN CALCIUM 5 MG/1
TABLET, COATED ORAL
Qty: 30 TABLET | Refills: 1 | Status: SHIPPED | OUTPATIENT
Start: 2020-08-27 | End: 2020-09-18 | Stop reason: SDUPTHER

## 2020-09-18 RX ORDER — ROSUVASTATIN CALCIUM 5 MG/1
TABLET, COATED ORAL
Qty: 90 TABLET | Refills: 0 | Status: SHIPPED | OUTPATIENT
Start: 2020-09-18 | End: 2020-10-20 | Stop reason: SDUPTHER

## 2020-10-13 RX ORDER — GABAPENTIN 300 MG/1
CAPSULE ORAL
Qty: 90 CAPSULE | Refills: 0 | Status: SHIPPED | OUTPATIENT
Start: 2020-10-13 | End: 2021-04-19 | Stop reason: ALTCHOICE

## 2020-10-20 ENCOUNTER — OFFICE VISIT (OUTPATIENT)
Dept: FAMILY MEDICINE CLINIC | Age: 65
End: 2020-10-20
Payer: MEDICARE

## 2020-10-20 VITALS
WEIGHT: 171 LBS | RESPIRATION RATE: 18 BRPM | OXYGEN SATURATION: 99 % | BODY MASS INDEX: 32.28 KG/M2 | HEIGHT: 61 IN | TEMPERATURE: 97.1 F | HEART RATE: 84 BPM | SYSTOLIC BLOOD PRESSURE: 132 MMHG | DIASTOLIC BLOOD PRESSURE: 74 MMHG

## 2020-10-20 LAB
A/G RATIO: 1.9 (ref 1.1–2.2)
ALBUMIN SERPL-MCNC: 3.9 G/DL (ref 3.4–5)
ALP BLD-CCNC: 74 U/L (ref 40–129)
ALT SERPL-CCNC: 12 U/L (ref 10–40)
ANION GAP SERPL CALCULATED.3IONS-SCNC: 12 MMOL/L (ref 3–16)
AST SERPL-CCNC: 17 U/L (ref 15–37)
BILIRUB SERPL-MCNC: 0.4 MG/DL (ref 0–1)
BILIRUBIN, POC: ABNORMAL
BLOOD URINE, POC: ABNORMAL
BUN BLDV-MCNC: 14 MG/DL (ref 7–20)
CALCIUM SERPL-MCNC: 9.3 MG/DL (ref 8.3–10.6)
CHLORIDE BLD-SCNC: 104 MMOL/L (ref 99–110)
CHOLESTEROL, FASTING: 176 MG/DL (ref 0–199)
CLARITY, POC: ABNORMAL
CO2: 28 MMOL/L (ref 21–32)
COLOR, POC: ABNORMAL
CREAT SERPL-MCNC: <0.5 MG/DL (ref 0.6–1.2)
CREATININE URINE POCT: 300
GFR AFRICAN AMERICAN: >60
GFR NON-AFRICAN AMERICAN: >60
GLOBULIN: 2.1 G/DL
GLUCOSE BLD-MCNC: 93 MG/DL (ref 70–99)
GLUCOSE URINE, POC: ABNORMAL
HBA1C MFR BLD: 6.7 %
HDLC SERPL-MCNC: 51 MG/DL (ref 40–60)
KETONES, POC: ABNORMAL
LDL CHOLESTEROL CALCULATED: 102 MG/DL
LEUKOCYTE EST, POC: ABNORMAL
MICROALBUMIN/CREAT 24H UR: 30 MG/G{CREAT}
MICROALBUMIN/CREAT UR-RTO: 30
NITRITE, POC: ABNORMAL
PH, POC: 5.5
POTASSIUM SERPL-SCNC: 4.3 MMOL/L (ref 3.5–5.1)
PROTEIN, POC: ABNORMAL
SODIUM BLD-SCNC: 144 MMOL/L (ref 136–145)
SPECIFIC GRAVITY, POC: >1.03
TOTAL PROTEIN: 6 G/DL (ref 6.4–8.2)
TRIGLYCERIDE, FASTING: 113 MG/DL (ref 0–150)
UROBILINOGEN, POC: 0.2
VLDLC SERPL CALC-MCNC: 23 MG/DL

## 2020-10-20 PROCEDURE — 1090F PRES/ABSN URINE INCON ASSESS: CPT | Performed by: NURSE PRACTITIONER

## 2020-10-20 PROCEDURE — 81002 URINALYSIS NONAUTO W/O SCOPE: CPT | Performed by: NURSE PRACTITIONER

## 2020-10-20 PROCEDURE — 82044 UR ALBUMIN SEMIQUANTITATIVE: CPT | Performed by: NURSE PRACTITIONER

## 2020-10-20 PROCEDURE — G8427 DOCREV CUR MEDS BY ELIG CLIN: HCPCS | Performed by: NURSE PRACTITIONER

## 2020-10-20 PROCEDURE — 36415 COLL VENOUS BLD VENIPUNCTURE: CPT | Performed by: NURSE PRACTITIONER

## 2020-10-20 PROCEDURE — G8417 CALC BMI ABV UP PARAM F/U: HCPCS | Performed by: NURSE PRACTITIONER

## 2020-10-20 PROCEDURE — G8484 FLU IMMUNIZE NO ADMIN: HCPCS | Performed by: NURSE PRACTITIONER

## 2020-10-20 PROCEDURE — G0009 ADMIN PNEUMOCOCCAL VACCINE: HCPCS | Performed by: NURSE PRACTITIONER

## 2020-10-20 PROCEDURE — 90732 PPSV23 VACC 2 YRS+ SUBQ/IM: CPT | Performed by: NURSE PRACTITIONER

## 2020-10-20 PROCEDURE — 4040F PNEUMOC VAC/ADMIN/RCVD: CPT | Performed by: NURSE PRACTITIONER

## 2020-10-20 PROCEDURE — 1036F TOBACCO NON-USER: CPT | Performed by: NURSE PRACTITIONER

## 2020-10-20 PROCEDURE — 2022F DILAT RTA XM EVC RTNOPTHY: CPT | Performed by: NURSE PRACTITIONER

## 2020-10-20 PROCEDURE — 99214 OFFICE O/P EST MOD 30 MIN: CPT | Performed by: NURSE PRACTITIONER

## 2020-10-20 PROCEDURE — 3017F COLORECTAL CA SCREEN DOC REV: CPT | Performed by: NURSE PRACTITIONER

## 2020-10-20 PROCEDURE — G8400 PT W/DXA NO RESULTS DOC: HCPCS | Performed by: NURSE PRACTITIONER

## 2020-10-20 PROCEDURE — 1123F ACP DISCUSS/DSCN MKR DOCD: CPT | Performed by: NURSE PRACTITIONER

## 2020-10-20 PROCEDURE — 83036 HEMOGLOBIN GLYCOSYLATED A1C: CPT | Performed by: NURSE PRACTITIONER

## 2020-10-20 PROCEDURE — 3044F HG A1C LEVEL LT 7.0%: CPT | Performed by: NURSE PRACTITIONER

## 2020-10-20 RX ORDER — ROSUVASTATIN CALCIUM 5 MG/1
TABLET, COATED ORAL
Qty: 90 TABLET | Refills: 3 | Status: SHIPPED | OUTPATIENT
Start: 2020-10-20 | End: 2021-01-01 | Stop reason: SDUPTHER

## 2020-10-20 RX ORDER — GABAPENTIN 300 MG/1
CAPSULE ORAL
Qty: 120 CAPSULE | Refills: 5 | Status: SHIPPED | OUTPATIENT
Start: 2020-11-12 | End: 2021-04-19

## 2020-10-20 RX ORDER — INSULIN ASPART 100 [IU]/ML
2-4 INJECTION, SOLUTION INTRAVENOUS; SUBCUTANEOUS
Qty: 5 ML | Refills: 3 | Status: SHIPPED | OUTPATIENT
Start: 2020-10-20 | End: 2020-11-02 | Stop reason: ALTCHOICE

## 2020-10-20 NOTE — PATIENT INSTRUCTIONS
good, quick way to make sure that you have a balanced meal. It also helps you spread carbs throughout the day. ? Divide your plate by types of foods. Put non-starchy vegetables on half the plate, meat or other protein food on one-quarter of the plate, and a grain or starchy vegetable in the final quarter of the plate. To this you can add a small piece of fruit and 1 cup of milk or yogurt, depending on how many carbs you are supposed to eat at a meal.  · Try to eat about the same amount of carbs at each meal. Do not \"save up\" your daily allowance of carbs to eat at one meal.  · Proteins have very little or no carbs per serving. Examples of proteins are beef, chicken, turkey, fish, eggs, tofu, cheese, cottage cheese, and peanut butter. A serving size of meat is 3 ounces, which is about the size of a deck of cards. Examples of meat substitute serving sizes (equal to 1 ounce of meat) are 1/4 cup of cottage cheese, 1 egg, 1 tablespoon of peanut butter, and ½ cup of tofu. How can you eat out and still eat healthy? · Learn to estimate the serving sizes of foods that have carbohydrate. If you measure food at home, it will be easier to estimate the amount in a serving of restaurant food. · If the meal you order has too much carbohydrate (such as potatoes, corn, or baked beans), ask to have a low-carbohydrate food instead. Ask for a salad or green vegetables. · If you use insulin, check your blood sugar before and after eating out to help you plan how much to eat in the future. · If you eat more carbohydrate at a meal than you had planned, take a walk or do other exercise. This will help lower your blood sugar. What else should you know? · Limit saturated fat, such as the fat from meat and dairy products. This is a healthy choice because people who have diabetes are at higher risk of heart disease. So choose lean cuts of meat and nonfat or low-fat dairy products.  Use olive or canola oil instead of butter or shortening when cooking. · Don't skip meals. Your blood sugar may drop too low if you skip meals and take insulin or certain medicines for diabetes. · Check with your doctor before you drink alcohol. Alcohol can cause your blood sugar to drop too low. Alcohol can also cause a bad reaction if you take certain diabetes medicines. Follow-up care is a key part of your treatment and safety. Be sure to make and go to all appointments, and call your doctor if you are having problems. It's also a good idea to know your test results and keep a list of the medicines you take. Where can you learn more? Go to https://[email or web address].[email or web address] org and sign in to your [employer] account. Enter J226 in the PrepClass box to learn more about \"Learning About Diabetes Food Guidelines. \"     If you do not have an account, please click on the \"Sign Up Now\" link. Current as of: December 20, 2019               Content Version: 12.6  © 5332-8235 [employer]. Care instructions adapted under license by [facility] [place] ([facility]). If you have questions about a medical condition or this instruction, always ask your healthcare professional. [address] any warranty or liability for your use of this information. Patient Education        Learning About Meal Planning for Diabetes  Why plan your meals? Meal planning can be a key part of managing diabetes. Planning meals and snacks with the right balance of carbohydrate, protein, and fat can help you keep your blood sugar at the target level you set with your doctor. You don't have to eat special foods. You can eat what your family eats, including sweets once in a while. But you do have to pay attention to how often you eat and how much you eat of certain foods. You may want to work with a dietitian or a certified diabetes educator. He or she can give you tips and meal ideas and can answer your questions about meal planning.  This health professional can also help you reach a healthy weight if that is one of your goals. What plan is right for you? Your dietitian or diabetes educator may suggest that you start with the plate format or carbohydrate counting. The plate format  The plate format is a simple way to help you manage how you eat. You plan meals by learning how much space each food should take on a plate. Using the plate format helps you spread carbohydrate throughout the day. It can make it easier to keep your blood sugar level within your target range. It also helps you see if you're eating healthy portion sizes. To use the plate format, you put non-starchy vegetables on half your plate. Add meat or meat substitutes on one-quarter of the plate. Put a grain or starchy vegetable (such as brown rice or a potato) on the final quarter of the plate. You can add a small piece of fruit and some low-fat or fat-free milk or yogurt, depending on your carbohydrate goal for each meal.  Here are some tips for using the plate format:  · Make sure that you are not using an oversized plate. A 9-inch plate is best. Many restaurants use larger plates. · Get used to using the plate format at home. Then you can use it when you eat out. · Write down your questions about using the plate format. Talk to your doctor, a dietitian, or a diabetes educator about your concerns. Carbohydrate counting  With carbohydrate counting, you plan meals based on the amount of carbohydrate in each food. Carbohydrate raises blood sugar higher and more quickly than any other nutrient. It is found in desserts, breads and cereals, and fruit. It's also found in starchy vegetables such as potatoes and corn, grains such as rice and pasta, and milk and yogurt. Spreading carbohydrate throughout the day helps keep your blood sugar levels within your target range.   Your daily amount depends on several things, including your weight, how active you are, which diabetes medicines you take, and what your goals are for your blood sugar levels. A registered dietitian or diabetes educator can help you plan how much carbohydrate to include in each meal and snack. A guideline for your daily amount of carbohydrate is:  · 45 to 60 grams at each meal. That's about the same as 3 to 4 carbohydrate servings. · 15 to 20 grams at each snack. That's about the same as 1 carbohydrate serving. The Nutrition Facts label on packaged foods tells you how much carbohydrate is in a serving of the food. First, look at the serving size on the food label. Is that the amount you eat in a serving? All of the nutrition information on a food label is based on that serving size. So if you eat more or less than that, you'll need to adjust the other numbers. Total carbohydrate is the next thing you need to look for on the label. If you count carbohydrate servings, one serving of carbohydrate is 15 grams. For foods that don't come with labels, such as fresh fruits and vegetables, you'll need a guide that lists carbohydrate in these foods. Ask your doctor, dietitian, or diabetes educator about books or other nutrition guides you can use. If you take insulin, you need to know how many grams of carbohydrate are in a meal. This lets you know how much rapid-acting insulin to take before you eat. If you use an insulin pump, you get a constant rate of insulin during the day. So the pump must be programmed at meals to give you extra insulin to cover the rise in blood sugar after meals. When you know how much carbohydrate you will eat, you can take the right amount of insulin. Or, if you always use the same amount of insulin, you need to make sure that you eat the same amount of carbohydrate at meals. If you need more help to understand carbohydrate counting and food labels, ask your doctor, dietitian, or diabetes educator. How do you get started with meal planning? Here are some tips to get started:  · Plan your meals a week at a time.  Don't forget to include snacks too. · Use cookbooks or online recipes to plan several main meals. Plan some quick meals for busy nights. You also can double some recipes that freeze well. Then you can save half for other busy nights when you don't have time to cook. · Make sure you have the ingredients you need for your recipes. If you're running low on basic items, put these items on your shopping list too. · List foods that you use to make breakfasts, lunches, and snacks. List plenty of fruits and vegetables. · Post this list on the refrigerator. Add to it as you think of more things you need. · Take the list to the store to do your weekly shopping. Follow-up care is a key part of your treatment and safety. Be sure to make and go to all appointments, and call your doctor if you are having problems. It's also a good idea to know your test results and keep a list of the medicines you take. Where can you learn more? Go to https://Speedyboy.[email or web address] org and sign in to your [employer] account. Enter K253 in the Nanalysis box to learn more about \"Learning About Meal Planning for Diabetes. \"     If you do not have an account, please click on the \"Sign Up Now\" link. Current as of: December 20, 2019               Content Version: 12.6  © 7732-5520 [employer]. Care instructions adapted under license by [facility] ([facility]). If you have questions about a medical condition or this instruction, always ask your healthcare professional. [place] [address] any warranty or liability for your use of this information. Patient Education        Varicose Veins: Care Instructions  Your Care Instructions  Varicose veins are twisted, enlarged veins near the surface of the skin. They develop most often in the legs and ankles. Some people may be more likely than others to get varicose veins because of aging or hormone changes or because a parent has them.  Being overweight or pregnant can make varicose veins worse. Jobs that require standing for long periods of time also can make them worse. Follow-up care is a key part of your treatment and safety. Be sure to make and go to all appointments, and call your doctor if you are having problems. It's also a good idea to know your test results and keep a list of the medicines you take. How can you care for yourself at home? · Wear compression stockings during the day to help relieve symptoms. They improve blood flow and are the main treatment for varicose veins. Talk to your doctor about which ones to get and where to get them. · Prop up your legs at or above the level of your heart when possible. This helps keep the blood from pooling in your lower legs and improves blood flow to the rest of your body. · Avoid sitting and standing for long periods. This puts added stress on your veins. · Get regular exercise, and control your weight. Walk, bicycle, or swim to improve blood flow in your legs. · If you bump your leg so hard that you know it is likely to bruise, prop up your leg and put ice or a cold pack on the area for 10 to 20 minutes at a time. Try to do this every 1 to 2 hours for the next 3 days (when you are awake) or until the swelling goes down. Put a thin cloth between the ice and your skin. · If you cut or scratch the skin over a vein, it may bleed a lot. Prop up your leg and apply firm pressure with a clean bandage over the site of the bleeding. Continue to apply pressure for a full 15 minutes. Do not check sooner to see if the bleeding has stopped. If the bleeding has not stopped after 15 minutes, apply pressure again for another 15 minutes. You can repeat this up to 3 times for a total of 45 minutes. If you have a blood clot in a varicose vein, you may have tenderness and swelling over the vein. The vein may feel firm. Be sure to call your doctor right away if you have these symptoms.  If your doctor has told you how to care for the

## 2020-10-20 NOTE — PROGRESS NOTES
Immunizations Administered     Name Date Dose Route    Pneumococcal Polysaccharide ([id number]) 10/20/2020 0.5 mL Intramuscular    Site: Deltoid- Left    Lot: [id number]    NDC: 4675-9967-80

## 2020-10-20 NOTE — PROGRESS NOTES
10/20/2020     Cj Nava (:  1955) is a 72 y.o. female, here for evaluation of the following medical concerns:    HPI   Chief Complaint   Patient presents with    Diabetes     DM ROUTINE FOLLOW UP      Diabetes Mellitus Type 2: Current symptoms/problems include none. Medication compliance:  compliant all of the time  Diabetic diet compliance:  compliant all of the time,  Weight trend: stable  Current exercise: no regular exercise but very active  Barriers: none    Home blood sugar records: fasting range:   Any episodes of hypoglycemia? no  Eye exam current (within one year): no   reports that she has never smoked. She has never used smokeless tobacco.   Daily Aspirin? No:       Pt has really bad varicose veins that cause her lots of pain with and without activity feels like someone is squeezing her legs in a vice - the neurontin/Tylenol 500 mg four times daiyl helps -she has tried controlleds  Ultram- Norcoand they did not help    Lab Results   Component Value Date    LABA1C 6.6 10/14/2019    LABA1C 6.7 04/15/2019    LABA1C 6.6 10/15/2018     Lab Results   Component Value Date    LABMICR <1.20 2018    CREATININE 0.5 (L) 04/15/2019     Lab Results   Component Value Date    ALT 12 04/15/2019    AST 16 04/15/2019     Lab Results   Component Value Date    CHOL 185 04/15/2019    TRIG 96 04/15/2019    HDL 55 04/15/2019    LDLCALC 111 (H) 04/15/2019        Hyperlipidemia:  No new myalgias or GI upset on rosuvastatin (Crestor). Medication compliance: compliant all of the time. Patient is  following a low fat, low cholesterol diet. She is  exercising regularly. Lab Results   Component Value Date    CHOL 185 04/15/2019    TRIG 96 04/15/2019    HDL 55 04/15/2019    LDLCALC 111 (H) 04/15/2019     Lab Results   Component Value Date    ALT 12 04/15/2019    AST 16 04/15/2019          Review of Systems   Eyes: Negative for visual disturbance.    Endocrine: Negative for polydipsia, polyphagia and polyuria. Musculoskeletal:        Bilateral lower extremity pain       Prior to Visit Medications    Medication Sig Taking? Authorizing Provider   gabapentin (NEURONTIN) 300 MG capsule TAKE THREE CAPSULES BY MOUTH ONCE NIGHTLY  HERIBERTO Drummond CNP   insulin glargine (LANTUS;BASAGLAR) 100 UNIT/ML injection pen Inject 20 Units into the skin nightly  HERIBERTO Drummond CNP   rosuvastatin (CRESTOR) 5 MG tablet TAKE ONE TABLET BY MOUTH ONCE NIGHTLY  HERIBERTO Massey CNP   insulin glargine (BASAGLAR KWIKPEN) 100 UNIT/ML injection pen Inject 20 Units into the skin nightly  HERIBERTO Drummond CNP   insulin aspart (NOVOLOG FLEXPEN) 100 UNIT/ML injection pen If pre meal sugar is 140-199 give 2 units, 200-249 4 units, 250-299 6 units, 300-349 8 units, 350-399 10 units, over 400 12 units and call MD  Patient taking differently: Inject 2-4 Units into the skin 3 times daily (before meals) If pre meal sugar is 140-199 give 2 units, 200-249 4 units, 250-299 6 units, 300-349 8 units, 350-399 10 units, over 400 12 units and call HERIBERTO Up CNP        Social History     Tobacco Use    Smoking status: Never Smoker    Smokeless tobacco: Never Used   Substance Use Topics    Alcohol use: No     Alcohol/week: 0.0 standard drinks        There were no vitals filed for this visit. Estimated body mass index is 31.74 kg/m² as calculated from the following:    Height as of 4/20/20: 5' 1\" (1.549 m). Weight as of 4/20/20: 168 lb (76.2 kg). Physical Exam  Vitals signs reviewed. Constitutional:       General: She is awake. She is not in acute distress. Appearance: Normal appearance. She is well-developed and well-groomed. She is not ill-appearing, toxic-appearing or diaphoretic. Neck:      Vascular: No carotid bruit. Cardiovascular:      Rate and Rhythm: Normal rate and regular rhythm.       Heart sounds: Normal heart sounds, S1 normal and S2 normal. Heart sounds not distant. No murmur. No systolic murmur. No diastolic murmur. No friction rub. No gallop. No S3 or S4 sounds. Comments: Varicosities noted to both legs  Pulmonary:      Effort: Pulmonary effort is normal.      Breath sounds: Normal breath sounds and air entry. Musculoskeletal:      Right lower leg: No edema. Left lower leg: No edema. Skin:     Comments: Visual inspection:  Deformity/amputation: absent  Skin lesions/pre-ulcerative calluses: absent  Edema: right- negative, left- negative  Sensory exam:  Monofilament sensation: normal  (minimum of 5 random plantar locations tested, avoiding callused areas - > 1 area with absence of sensation is + for neuropathy)  Plus at least one of the following:  Pulses: normal,   Neurological:      Mental Status: She is alert and oriented to person, place, and time. Psychiatric:         Attention and Perception: Attention and perception normal.         Mood and Affect: Mood and affect normal.         Speech: Speech normal.         Behavior: Behavior normal. Behavior is cooperative. Thought Content: Thought content normal.         Cognition and Memory: Cognition and memory normal.     Romana Alex was seen today for diabetes. Diagnoses and all orders for this visit:    Type 2 diabetes mellitus without complication, with long-term current use of insulin ([facility])    POCT glycosylated hemoglobin (Hb A1C) 6.7  Continue the following medications-     insulin glargine (LANTUS;BASAGLAR) 100 UNIT/ML injection pen; Inject 20 Units into the skin nightly       insulin aspart (NOVOLOG FLEXPEN) 100 UNIT/ML injection pen; Inject 2-4 Units into the skin 3 times daily (before meals) If pre meal sugar is 140-199 give 2 units, 200-249 4 units, 250-299 6 units, 300-349 8 units, 350-399 10 units, over 400 12 units and call MD  POCT microalbumin   DIABETES FOOT EXAM  COMPREHENSIVE METABOLIC PANEL;  Future            Aortic atherosclerosis (HCC)  -     rosuvastatin (CRESTOR) 5 MG tablet; TAKE ONE TABLET BY MOUTH ONCE NIGHTLY  -     Lipid, Fasting      Varicose veins of both lower extremities with pain  Pt states she has seen a  Vascular surgeon in the past and will not have any preocedures  -     gabapentin (NEURONTIN) 300 MG capsule; 1 tab in am 3 tabs at night fill 11/12  Cloudy urine  -     POCT Urinalysis no Micro  -     Culture, Urine    Encounter for osteoporosis screening in asymptomatic postmenopausal patient  Pt declined dexa scan  Encounter for screening mammogram for breast cancer  Pt declined mammogram  Need for 23-polyvalent pneumococcal polysaccharide vaccine  -     Pneumococcal polysaccharide vaccine 23-valent greater than or equal to 1yo subcutaneous/IM

## 2020-10-21 LAB — URINE CULTURE, ROUTINE: NORMAL

## 2020-10-22 ENCOUNTER — TELEPHONE (OUTPATIENT)
Dept: ADMINISTRATIVE | Age: 65
End: 2020-10-22

## 2020-10-27 ENCOUNTER — TELEPHONE (OUTPATIENT)
Dept: FAMILY MEDICINE CLINIC | Age: 65
End: 2020-10-27

## 2020-10-27 NOTE — TELEPHONE ENCOUNTER
She does not take the Novolog flexpen anymore - she needs it to be Humalog    Please correct in her prescriptions that are on file -    Pt just seen  10/20/20    Pt @  [phone]

## 2020-11-02 RX ORDER — INSULIN LISPRO 100 [IU]/ML
INJECTION, SOLUTION INTRAVENOUS; SUBCUTANEOUS
Qty: 5 PEN | Refills: 0 | Status: SHIPPED | OUTPATIENT
Start: 2020-11-02 | End: 2021-01-01 | Stop reason: SDUPTHER

## 2021-01-01 ENCOUNTER — OFFICE VISIT (OUTPATIENT)
Dept: FAMILY MEDICINE CLINIC | Age: 66
End: 2021-01-01
Payer: MEDICARE

## 2021-01-01 ENCOUNTER — TELEPHONE (OUTPATIENT)
Dept: FAMILY MEDICINE CLINIC | Age: 66
End: 2021-01-01

## 2021-01-01 VITALS
RESPIRATION RATE: 14 BRPM | HEIGHT: 61 IN | OXYGEN SATURATION: 98 % | TEMPERATURE: 97.3 F | SYSTOLIC BLOOD PRESSURE: 110 MMHG | BODY MASS INDEX: 29.15 KG/M2 | HEART RATE: 91 BPM | DIASTOLIC BLOOD PRESSURE: 60 MMHG | WEIGHT: 154.4 LBS

## 2021-01-01 DIAGNOSIS — E11.9 TYPE 2 DIABETES MELLITUS WITHOUT COMPLICATION, WITH LONG-TERM CURRENT USE OF INSULIN (HCC): Primary | ICD-10-CM

## 2021-01-01 DIAGNOSIS — E78.2 MIXED HYPERLIPIDEMIA: ICD-10-CM

## 2021-01-01 DIAGNOSIS — Z79.4 TYPE 2 DIABETES MELLITUS WITHOUT COMPLICATION, WITH LONG-TERM CURRENT USE OF INSULIN (HCC): Primary | ICD-10-CM

## 2021-01-01 DIAGNOSIS — M79.605 PAIN IN BOTH LOWER EXTREMITIES: ICD-10-CM

## 2021-01-01 DIAGNOSIS — M79.604 PAIN IN BOTH LOWER EXTREMITIES: ICD-10-CM

## 2021-01-01 LAB
A/G RATIO: 1.4 (ref 1.1–2.2)
ALBUMIN SERPL-MCNC: 4 G/DL (ref 3.4–5)
ALP BLD-CCNC: 100 U/L (ref 40–129)
ALT SERPL-CCNC: 10 U/L (ref 10–40)
ANION GAP SERPL CALCULATED.3IONS-SCNC: 12 MMOL/L (ref 3–16)
AST SERPL-CCNC: 16 U/L (ref 15–37)
BILIRUB SERPL-MCNC: 0.4 MG/DL (ref 0–1)
BUN BLDV-MCNC: 9 MG/DL (ref 7–20)
CALCIUM SERPL-MCNC: 9.6 MG/DL (ref 8.3–10.6)
CHLORIDE BLD-SCNC: 102 MMOL/L (ref 99–110)
CHOLESTEROL, TOTAL: 170 MG/DL (ref 0–199)
CO2: 27 MMOL/L (ref 21–32)
CREAT SERPL-MCNC: 0.5 MG/DL (ref 0.6–1.2)
CREATININE URINE POCT: 300
GFR AFRICAN AMERICAN: >60
GFR NON-AFRICAN AMERICAN: >60
GLOBULIN: 2.9 G/DL
GLUCOSE BLD-MCNC: 95 MG/DL (ref 70–99)
HBA1C MFR BLD: 6.5 %
HDLC SERPL-MCNC: 43 MG/DL (ref 40–60)
LDL CHOLESTEROL CALCULATED: 103 MG/DL
MICROALBUMIN/CREAT 24H UR: 80 MG/G{CREAT}
MICROALBUMIN/CREAT UR-RTO: ABNORMAL
POTASSIUM SERPL-SCNC: 4.5 MMOL/L (ref 3.5–5.1)
SODIUM BLD-SCNC: 141 MMOL/L (ref 136–145)
TOTAL PROTEIN: 6.9 G/DL (ref 6.4–8.2)
TRIGL SERPL-MCNC: 121 MG/DL (ref 0–150)
VLDLC SERPL CALC-MCNC: 24 MG/DL

## 2021-01-01 PROCEDURE — 1123F ACP DISCUSS/DSCN MKR DOCD: CPT | Performed by: NURSE PRACTITIONER

## 2021-01-01 PROCEDURE — 3017F COLORECTAL CA SCREEN DOC REV: CPT | Performed by: NURSE PRACTITIONER

## 2021-01-01 PROCEDURE — G8417 CALC BMI ABV UP PARAM F/U: HCPCS | Performed by: NURSE PRACTITIONER

## 2021-01-01 PROCEDURE — 99213 OFFICE O/P EST LOW 20 MIN: CPT | Performed by: NURSE PRACTITIONER

## 2021-01-01 PROCEDURE — 1036F TOBACCO NON-USER: CPT | Performed by: NURSE PRACTITIONER

## 2021-01-01 PROCEDURE — 1090F PRES/ABSN URINE INCON ASSESS: CPT | Performed by: NURSE PRACTITIONER

## 2021-01-01 PROCEDURE — 2022F DILAT RTA XM EVC RTNOPTHY: CPT | Performed by: NURSE PRACTITIONER

## 2021-01-01 PROCEDURE — 83036 HEMOGLOBIN GLYCOSYLATED A1C: CPT | Performed by: NURSE PRACTITIONER

## 2021-01-01 PROCEDURE — G8484 FLU IMMUNIZE NO ADMIN: HCPCS | Performed by: NURSE PRACTITIONER

## 2021-01-01 PROCEDURE — 4040F PNEUMOC VAC/ADMIN/RCVD: CPT | Performed by: NURSE PRACTITIONER

## 2021-01-01 PROCEDURE — 82044 UR ALBUMIN SEMIQUANTITATIVE: CPT | Performed by: NURSE PRACTITIONER

## 2021-01-01 PROCEDURE — 3044F HG A1C LEVEL LT 7.0%: CPT | Performed by: NURSE PRACTITIONER

## 2021-01-01 PROCEDURE — G8427 DOCREV CUR MEDS BY ELIG CLIN: HCPCS | Performed by: NURSE PRACTITIONER

## 2021-01-01 PROCEDURE — G8400 PT W/DXA NO RESULTS DOC: HCPCS | Performed by: NURSE PRACTITIONER

## 2021-01-01 PROCEDURE — 36415 COLL VENOUS BLD VENIPUNCTURE: CPT | Performed by: NURSE PRACTITIONER

## 2021-01-01 RX ORDER — INSULIN LISPRO 100 [IU]/ML
INJECTION, SOLUTION INTRAVENOUS; SUBCUTANEOUS
Qty: 5 PEN | Refills: 0 | Status: SHIPPED | OUTPATIENT
Start: 2021-01-01 | End: 2022-01-01

## 2021-01-01 RX ORDER — GABAPENTIN 300 MG/1
CAPSULE ORAL
Qty: 180 CAPSULE | Refills: 5 | Status: SHIPPED | OUTPATIENT
Start: 2021-01-01 | End: 2022-01-01 | Stop reason: SDUPTHER

## 2021-01-01 RX ORDER — ROSUVASTATIN CALCIUM 5 MG/1
TABLET, COATED ORAL
Qty: 90 TABLET | Refills: 1 | Status: SHIPPED | OUTPATIENT
Start: 2021-01-01 | End: 2022-01-01

## 2021-04-19 ENCOUNTER — OFFICE VISIT (OUTPATIENT)
Dept: FAMILY MEDICINE CLINIC | Age: 66
End: 2021-04-19
Payer: MEDICARE

## 2021-04-19 VITALS
TEMPERATURE: 97.3 F | HEART RATE: 87 BPM | SYSTOLIC BLOOD PRESSURE: 142 MMHG | WEIGHT: 166.2 LBS | HEIGHT: 61 IN | BODY MASS INDEX: 31.38 KG/M2 | RESPIRATION RATE: 12 BRPM | DIASTOLIC BLOOD PRESSURE: 66 MMHG | OXYGEN SATURATION: 97 %

## 2021-04-19 DIAGNOSIS — I83.813 VARICOSE VEINS OF BOTH LOWER EXTREMITIES WITH PAIN: ICD-10-CM

## 2021-04-19 DIAGNOSIS — M79.605 PAIN IN BOTH LOWER EXTREMITIES: ICD-10-CM

## 2021-04-19 DIAGNOSIS — M79.604 PAIN IN BOTH LOWER EXTREMITIES: ICD-10-CM

## 2021-04-19 DIAGNOSIS — E11.9 TYPE 2 DIABETES MELLITUS WITHOUT COMPLICATION, WITH LONG-TERM CURRENT USE OF INSULIN (HCC): ICD-10-CM

## 2021-04-19 DIAGNOSIS — I70.0 AORTIC ATHEROSCLEROSIS (HCC): ICD-10-CM

## 2021-04-19 DIAGNOSIS — Z00.00 ROUTINE GENERAL MEDICAL EXAMINATION AT A HEALTH CARE FACILITY: Primary | ICD-10-CM

## 2021-04-19 DIAGNOSIS — Z79.4 TYPE 2 DIABETES MELLITUS WITHOUT COMPLICATION, WITH LONG-TERM CURRENT USE OF INSULIN (HCC): ICD-10-CM

## 2021-04-19 LAB — HBA1C MFR BLD: 7.2 %

## 2021-04-19 PROCEDURE — 4040F PNEUMOC VAC/ADMIN/RCVD: CPT | Performed by: NURSE PRACTITIONER

## 2021-04-19 PROCEDURE — 83036 HEMOGLOBIN GLYCOSYLATED A1C: CPT | Performed by: NURSE PRACTITIONER

## 2021-04-19 PROCEDURE — 3051F HG A1C>EQUAL 7.0%<8.0%: CPT | Performed by: NURSE PRACTITIONER

## 2021-04-19 PROCEDURE — 3017F COLORECTAL CA SCREEN DOC REV: CPT | Performed by: NURSE PRACTITIONER

## 2021-04-19 PROCEDURE — 1123F ACP DISCUSS/DSCN MKR DOCD: CPT | Performed by: NURSE PRACTITIONER

## 2021-04-19 PROCEDURE — G0402 INITIAL PREVENTIVE EXAM: HCPCS | Performed by: NURSE PRACTITIONER

## 2021-04-19 RX ORDER — GABAPENTIN 300 MG/1
CAPSULE ORAL
Qty: 180 CAPSULE | Refills: 5 | Status: SHIPPED | OUTPATIENT
Start: 2021-04-19 | End: 2021-01-01 | Stop reason: SDUPTHER

## 2021-04-19 SDOH — ECONOMIC STABILITY: TRANSPORTATION INSECURITY
IN THE PAST 12 MONTHS, HAS LACK OF TRANSPORTATION KEPT YOU FROM MEETINGS, WORK, OR FROM GETTING THINGS NEEDED FOR DAILY LIVING?: NO

## 2021-04-19 ASSESSMENT — PATIENT HEALTH QUESTIONNAIRE - PHQ9
SUM OF ALL RESPONSES TO PHQ QUESTIONS 1-9: 2
1. LITTLE INTEREST OR PLEASURE IN DOING THINGS: 1

## 2021-04-19 ASSESSMENT — LIFESTYLE VARIABLES: HOW OFTEN DO YOU HAVE A DRINK CONTAINING ALCOHOL: 0

## 2021-04-19 NOTE — PROGRESS NOTES
Medicare Annual Wellness Visit  Name: Rei Moore Date: 2021   MRN: [id number] Sex: Female   Age: 72 y.o. Ethnicity: Non-/Non    : 1955 Race: Jennifer Francis is here for Medicare AWV and Diabetes    Screenings for behavioral, psychosocial and functional/safety risks, and cognitive dysfunction are all negative except as indicated below. These results, as well as other patient data from the [address] form, are documented in Flowsheets linked to this Encounter. Diabetes  She does check her blood sugars int he am - ususally 120 in the am  She has been doing lots of baking - taste testing - lots of birth day parties  Denies increased thirst- frequent urination or vision changes  She is using 20-22 units of lantus   rarely has to use the fast acting insulin  No Known Allergies    Prior to Visit Medications    Medication Sig Taking? Authorizing Provider   insulin lispro, 1 Unit Dial, (HUMALOG KWIKPEN) 100 UNIT/ML SOPN 2-4 units 3 x daily before meals Yes Rea Mendoza APRN - CNP   insulin glargine (LANTUS;BASAGLAR) 100 UNIT/ML injection pen Inject 20 Units into the skin nightly Yes Rea Mendoza APRN - CNP   rosuvastatin (CRESTOR) 5 MG tablet TAKE ONE TABLET BY MOUTH ONCE NIGHTLY Yes Rea Mendoza APRN - CNP   gabapentin (NEURONTIN) 300 MG capsule TAKE THREE CAPSULES BY MOUTH ONCE NIGHTLY  Patient taking differently: TAKE 1 CAPSULE IN THE MORNING AND TAKE THREE CAPSULES BY MOUTH ONCE NIGHTLY Yes Dudley Carrasco APRN - CNP       Past Medical History:   Diagnosis Date    Congenital prolapsed rectum     Displaced bladder     Hernia     Mixed hyperlipidemia 10/14/2019    Type 2 diabetes mellitus without complication ([facility] [address]     Varicose vein of leg        No past surgical history on file.     Family History   Problem Relation Age of Onset    High Blood Pressure Mother     Hypertension Mother     High Cholesterol Mother    Maggi Alaniz Mother     Thyroid Disease Mother     Cancer Father     Alcohol Abuse Father     High Blood Pressure Sister     High Cholesterol Sister     Thyroid Disease Sister     Heart Disease Brother     Alcohol Abuse Brother        CareTeam (Including outside providers/suppliers regularly involved in providing care):   Patient Care Team:  HERIBERTO Garcia CNP as PCP - General (Family Nurse Practitioner)  HERIBERTO Garcia CNP as PCP - [facility] Empaneled Provider    Wt Readings from Last 3 Encounters:   04/19/21 166 lb 3.2 oz (75.4 kg)   10/20/20 171 lb (77.6 kg)   04/20/20 168 lb (76.2 kg)     Vitals:    04/19/21 1004 04/19/21 1018   BP: (!) 148/70 (!) 142/66   Site: Left Upper Arm Left Upper Arm   Position: Sitting Sitting   Cuff Size: Medium Adult Medium Adult   Pulse: 87    Resp: 12    Temp: 97.3 °F (36.3 °C)    TempSrc: Infrared    SpO2: 97%    Weight: 166 lb 3.2 oz (75.4 kg)    Height: 5' 1\" (1.549 m)      Body mass index is 31.4 kg/m². Based upon direct observation of the patient, evaluation of cognition reveals recent and remote memory intact. General Appearance: alert and oriented to person, place and time, well-developed and well-nourished, in no acute distress  Pulmonary/Chest: clear to auscultation bilaterally- no wheezes, rales or rhonchi, normal air movement, no respiratory distress  Cardiovascular: normal rate, normal S1 and S2 and no carotid bruits    Patient's complete Health Risk Assessment and screening values have been reviewed and are found in Flowsheets. The following problems were reviewed today and where indicated follow up appointments were made and/or referrals ordered. Positive Risk Factor Screenings with Interventions:          General Health and ACP:  General  In general, how would you say your health is?: Good  In the past 7 days, have you experienced any of the following?  New or Increased Pain, New or Increased Fatigue, Loneliness, Social Isolation, Stress or Anger?: (!) New or Increased Pain, Stress- pain from varicose veins in both legs has seen a vascular surgeon and is scared to proceed with any procedures due to her  diabetes  Do you get the social and emotional support that you need?: (!) No- her  just brushes her off- she does have a sister she can talk to and sees regularly  Do you have a Living Will?: (!) No- has plans to make a living will and a regular will   Advance Directives     Power of [facility] Will ACP-Advance Directive ACP-Power of     Not on File Not on File Not on File Not on [address] [facility] Interventions:  · Pain issues: medication adjusted- gabapentin increased to 6 tabs daily  · No Living Will: pt has a  in mind for living will    Health Habits/Nutrition:  Health Habits/Nutrition  Do you exercise for at least 20 minutes 2-3 times per week?: (!) No- she is busy/active all day no regular exercise  Have you lost any weight without trying in the past 3 months?: No  Do you eat only one meal per day?: No  Have you seen the dentist within the past year?: Yes  Body mass index: (!) 31.4  Health Habits/Nutrition Interventions:  · Inadequate physical activity:  patient is not ready to increase his/her physical activity level at this time    Hearing/Vision:  No exam data present  Hearing/Vision  Do you or your family notice any trouble with your hearing that hasn't been managed with hearing aids?: No  Do you have difficulty driving, watching TV, or doing any of your daily activities because of your eyesight?: No  Have you had an eye exam within the past year?:  Yes [facility] within the last year 9/20  Hearing/Vision Interventions:  · Hearing concerns:  n/a  · Vision concerns:  patient encouraged to make appointment with his/her eye specialist      Personalized Preventive Plan   Current Health Maintenance Status  Immunization History   Administered Date(s) Administered    Patrick BRIDGES PF, 30mcg/0.3mL 03/11/2021, 04/01/2021    Pneumococcal Polysaccharide ([id number]) 10/20/2020        Health Maintenance   Topic Date Due    DTaP/Tdap/Td vaccine (1 - Tdap) Never done    Cervical cancer screen  Never done    Breast cancer screen  Never done    Shingles Vaccine (1 of 2) Never done    Colon cancer screen colonoscopy  Never done    DEXA (modify frequency per FRAX score)  Never done   [employer] Visit (AWV)  Never done    Flu vaccine (Season Ended) 09/01/2021    Diabetic foot exam  10/20/2021    A1C test (Diabetic or Prediabetic)  10/20/2021    Diabetic microalbuminuria test  10/20/2021    Lipid screen  10/20/2021    Diabetic retinal exam  11/16/2021    Pneumococcal 65+ years Vaccine  Completed    COVID-19 Vaccine  Completed    Hepatitis C screen  Completed    HIV screen  Completed    Hepatitis A vaccine  Aged Out    Hib vaccine  Aged Out    Meningococcal (ACWY) vaccine  Aged Out     Recommendations for [employer] Due: see orders and patient instructions/AVS.  . Recommended screening schedule for the next 5-10 years is provided to the patient in written form: see Patient Clover Bledsoe was seen today for medicare awv and diabetes. Diagnoses and all orders for this visit:    Routine general medical examination at a health care facility  - Reviewed medical and social history together. Discussed wide range of preventive recommendations for Medicare population, including fall prevention, exercise, recommendations for healthy produce-based diet, vaccines and routine screening tests, addressing all care gaps.   Aortic atherosclerosis (HCC)  Continue crestor    Pain in both lower extremities  Varicose veins of both lower extremities with pain  -     gabapentin (NEURONTIN) 300 MG capsule; 1 tab in am 1 tab afternoon 1 tab at dinner 3 tabs at bedtime    Type 2 diabetes mellitus without complication, with long-term current use of insulin (HCC)  -     POCT glycosylated hemoglobin (Hb A1C) 7.2  Continue lantus and humalog as prescribed  Follow up in six months      Health maintenance recommendations declined  COVID  Colon cancer screening  Mammogram  Dexa scan  Well womanexam

## 2021-04-19 NOTE — PATIENT INSTRUCTIONS
Patient Education        Learning About Meal Planning for Diabetes  Why plan your meals? Meal planning can be a key part of managing diabetes. Planning meals and snacks with the right balance of carbohydrate, protein, and fat can help you keep your blood sugar at the target level you set with your doctor. You don't have to eat special foods. You can eat what your family eats, including sweets once in a while. But you do have to pay attention to how often you eat and how much you eat of certain foods. You may want to work with a dietitian or a certified diabetes educator. He or she can give you tips and meal ideas and can answer your questions about meal planning. This health professional can also help you reach a healthy weight if that is one of your goals. What plan is right for you? Your dietitian or diabetes educator may suggest that you start with the plate format or carbohydrate counting. The plate format  The plate format is a simple way to help you manage how you eat. You plan meals by learning how much space each food should take on a plate. Using the plate format helps you spread carbohydrate throughout the day. It can make it easier to keep your blood sugar level within your target range. It also helps you see if you're eating healthy portion sizes. To use the plate format, you put non-starchy vegetables on half your plate. Add meat or meat substitutes on one-quarter of the plate. Put a grain or starchy vegetable (such as brown rice or a potato) on the final quarter of the plate. You can add a small piece of fruit and some low-fat or fat-free milk or yogurt, depending on your carbohydrate goal for each meal.  Here are some tips for using the plate format:  · Make sure that you are not using an oversized plate. A 9-inch plate is best. Many restaurants use larger plates. · Get used to using the plate format at home. Then you can use it when you eat out.   · Write down your questions about using the plate format. Talk to your doctor, a dietitian, or a diabetes educator about your concerns. Carbohydrate counting  With carbohydrate counting, you plan meals based on the amount of carbohydrate in each food. Carbohydrate raises blood sugar higher and more quickly than any other nutrient. It is found in desserts, breads and cereals, and fruit. It's also found in starchy vegetables such as potatoes and corn, grains such as rice and pasta, and milk and yogurt. Spreading carbohydrate throughout the day helps keep your blood sugar levels within your target range. Your daily amount depends on several things, including your weight, how active you are, which diabetes medicines you take, and what your goals are for your blood sugar levels. A registered dietitian or diabetes educator can help you plan how much carbohydrate to include in each meal and snack. A guideline for your daily amount of carbohydrate is:  · 45 to 60 grams at each meal. That's about the same as 3 to 4 carbohydrate servings. · 15 to 20 grams at each snack. That's about the same as 1 carbohydrate serving. The Nutrition Facts label on packaged foods tells you how much carbohydrate is in a serving of the food. First, look at the serving size on the food label. Is that the amount you eat in a serving? All of the nutrition information on a food label is based on that serving size. So if you eat more or less than that, you'll need to adjust the other numbers. Total carbohydrate is the next thing you need to look for on the label. If you count carbohydrate servings, one serving of carbohydrate is 15 grams. For foods that don't come with labels, such as fresh fruits and vegetables, you'll need a guide that lists carbohydrate in these foods. Ask your doctor, dietitian, or diabetes educator about books or other nutrition guides you can use.   If you take insulin, you need to know how many grams of carbohydrate are in a meal. This lets you know how much rapid-acting insulin to take before you eat. If you use an insulin pump, you get a constant rate of insulin during the day. So the pump must be programmed at meals to give you extra insulin to cover the rise in blood sugar after meals. When you know how much carbohydrate you will eat, you can take the right amount of insulin. Or, if you always use the same amount of insulin, you need to make sure that you eat the same amount of carbohydrate at meals. If you need more help to understand carbohydrate counting and food labels, ask your doctor, dietitian, or diabetes educator. How can you plan healthy meals? Here are some tips to get started:  · Plan your meals a week at a time. Don't forget to include snacks too. · Use cookbooks or online recipes to plan several main meals. Plan some quick meals for busy nights. You also can double some recipes that freeze well. Then you can save half for other busy nights when you don't have time to cook. · Make sure you have the ingredients you need for your recipes. If you're running low on basic items, put these items on your shopping list too. · List foods that you use to make breakfasts, lunches, and snacks. List plenty of fruits and vegetables. · Post this list on the refrigerator. Add to it as you think of more things you need. · Take the list to the store to do your weekly shopping. Follow-up care is a key part of your treatment and safety. Be sure to make and go to all appointments, and call your doctor if you are having problems. It's also a good idea to know your test results and keep a list of the medicines you take. Where can you learn more? Go to https://asya.TrueInsider. org and sign in to your Ondore account. Enter L044 in the [facility] box to learn more about \"Learning About Meal Planning for Diabetes. \"     If you do not have an account, please click on the \"Sign Up Now\" link.   Current as of: August 31, 2020               Content Version: 12.8  © 3161-1844 Healthwise, [employer]. Care instructions adapted under license by [facility] ([facility]). If you have questions about a medical condition or this instruction, always ask your healthcare professional. [address] any warranty or liability for your use of this information. Patient Education        Counting Carbohydrates: Care Instructions  Your Care Instructions     You don't have to eat special foods when you have diabetes. You just have to be careful to eat healthy foods. Carbohydrates (carbs) raise blood sugar higher and quicker than any other nutrient. Carbs are found in desserts, breads and cereals, and fruit. They're also in starchy vegetables. These include potatoes, corn, and grains such as rice and pasta. Carbs are also in milk and yogurt. The more carbs you eat at one time, the higher your blood sugar will rise. Spreading carbs all through the day helps keep your blood sugar levels within your target range. Counting carbs is one of the best ways to keep your blood sugar under control. If you use insulin, counting carbs helps you match the right amount of insulin to the number of grams of carbs in a meal. Then you can change your diet and insulin dose as needed. Testing your blood sugar several times a day can help you learn how carbs affect your blood sugar. A registered dietitian or certified diabetes educator can help you plan meals and snacks. Follow-up care is a key part of your treatment and safety. Be sure to make and go to all appointments, and call your doctor if you are having problems. It's also a good idea to know your test results and keep a list of the medicines you take. How can you care for yourself at home? Know your daily amount of carbohydrates  Your daily amount depends on several things, such as your weight, how active you are, which diabetes medicines you take, and what your goals are for your blood sugar levels.  A registered otherwise. Eat from all food groups  · Eat at least three meals a day. · Plan meals to include food from all the food groups. The food groups include grains, fruits, dairy, proteins, and vegetables. · Talk to your dietitian or diabetes educator about ways to add limited amounts of sweets into your meal plan. · If you drink alcohol, talk to your doctor. It may not be recommended when you are taking certain diabetes medicines. Where can you learn more? Go to https://Blue Mammoth Games.[employer]. org and sign in to your Ame[employer] account. Enter D216 in the [employer] box to learn more about \"Counting Carbohydrates: Care Instructions. \"     If you do not have an account, please click on the \"Sign Up Now\" link. Current as of: August 31, 2020               Content Version: 12.8  © 2006-2021 [employer]. Care instructions adapted under license by [facility] ([facility]). If you have questions about a medical condition or this instruction, always ask your healthcare professional. [place] [address] any warranty or liability for your use of this information. Patient Education        Varicose Veins: Care Instructions  Your Care Instructions  Varicose veins are twisted, enlarged veins near the surface of the skin. They develop most often in the legs and ankles. Some people may be more likely than others to get varicose veins because of aging or hormone changes or because a parent has them. Being overweight or pregnant can make varicose veins worse. Jobs that require standing for long periods of time also can make them worse. Follow-up care is a key part of your treatment and safety. Be sure to make and go to all appointments, and call your doctor if you are having problems. It's also a good idea to know your test results and keep a list of the medicines you take. How can you care for yourself at home? · Wear compression stockings during the day to help relieve symptoms.  They improve blood flow and are the main treatment for varicose veins. Talk to your doctor about which ones to get and where to get them. · Prop up your legs at or above the level of your heart when possible. This helps keep the blood from pooling in your lower legs and improves blood flow to the rest of your body. · Avoid sitting and standing for long periods. This puts added stress on your veins. · Get regular exercise, and control your weight. Walk, bicycle, or swim to improve blood flow in your legs. · If you bump your leg so hard that you know it is likely to bruise, prop up your leg and put ice or a cold pack on the area for 10 to 20 minutes at a time. Try to do this every 1 to 2 hours for the next 3 days (when you are awake) or until the swelling goes down. Put a thin cloth between the ice and your skin. · If you cut or scratch the skin over a vein, it may bleed a lot. Prop up your leg and apply firm pressure with a clean bandage over the site of the bleeding. Continue to apply pressure for a full 15 minutes. Do not check sooner to see if the bleeding has stopped. If the bleeding has not stopped after 15 minutes, apply pressure again for another 15 minutes. You can repeat this up to 3 times for a total of 45 minutes. If you have a blood clot in a varicose vein, you may have tenderness and swelling over the vein. The vein may feel firm. Be sure to call your doctor right away if you have these symptoms. If your doctor has told you how to care for the clot, follow his or her instructions. Care may include the following:  · Prop up your leg and apply heat with a warm, damp cloth or a heating pad set on low (put a towel or cloth between your leg and the heating pad to prevent burns). · Ask your doctor if you can take an over-the-counter pain medicine, such as acetaminophen (Tylenol), ibuprofen (Advil, Motrin), or naproxen (Aleve). Be safe with medicines. Read and follow all instructions on the label.   When should you call for help? Call 911 anytime you think you may need emergency care. For example, call if:    · You have sudden chest pain and shortness of breath, or you cough up blood. Call your doctor now or seek immediate medical care if:    · You have signs of a blood clot, such as:  ? Pain in your calf, back of the knee, thigh, or groin. ? Redness and swelling in your leg or groin.     · A varicose vein begins to bleed and you cannot stop it.     · You have a tender lump in your leg.     · You get an open sore. Watch closely for changes in your health, and be sure to contact your doctor if:    · Your varicose vein symptoms do not improve with home treatment. Where can you learn more? Go to https://Mainstream Data.coComment. org and sign in to your [employer] account. Enter S114 in the [employer] box to learn more about \"Varicose Veins: Care Instructions. \"     If you do not have an account, please click on the \"Sign Up Now\" link. Current as of: March 4, 2020               Content Version: 12.8  © 0889-5531 Healthwise, [employer]. Care instructions adapted under license by [facility] ([facility]). If you have questions about a medical condition or this instruction, always ask your healthcare professional. [address] any warranty or liability for your use of this information. Personalized Preventive Plan for Nalini Kim - 4/19/2021  Medicare offers a range of preventive health benefits. Some of the tests and screenings are paid in full while other may be subject to a deductible, co-insurance, and/or copay. Some of these benefits include a comprehensive review of your medical history including lifestyle, illnesses that may run in your family, and various assessments and screenings as appropriate.     After reviewing your medical record and screening and assessments performed today your provider may have ordered immunizations, labs, imaging, and/or referrals for you.  A list of these orders (if applicable) as well as your Preventive Care list are included within your After Visit Summary for your review. Other Preventive Recommendations:    · A preventive eye exam performed by an eye specialist is recommended every 1-2 years to screen for glaucoma; cataracts, macular degeneration, and other eye disorders. · A preventive dental visit is recommended every 6 months. · Try to get at least 150 minutes of exercise per week or 10,000 steps per day on a pedometer . · Order or download the FREE \"Exercise & Physical Activity: Your Everyday Guide\" from [employer]. Call 5-769.334.9237 or search [employer] online. · You need 0989-0666 mg of calcium and 0354-4410 IU of vitamin D per day. It is possible to meet your calcium requirement with diet alone, but a vitamin D supplement is usually necessary to meet this goal.  · When exposed to the sun, use a sunscreen that protects against both UVA and UVB radiation with an SPF of 30 or greater. Reapply every 2 to 3 hours or after sweating, drying off with a towel, or swimming. · Always wear a seat belt when traveling in a car. Always wear a helmet when riding a bicycle or motorcycle.

## 2021-10-18 NOTE — PROGRESS NOTES
Adrian De La Torre (:  1955) is a 77 y.o. female,Established patient, here for evaluation of the following chief complaint(s):    Diabetes (FOLLOW UP) and Other (FLU VACCINE DENIED TODAY)      SUBJECTIVE/OBJECTIVE:  HPI  ROUTINE DIABETES  SHE DOES CHECK HER -120   A LITTLE HIGHER IN THE EVENINGS DEPENDING ON WHAT SHE EATS  LANTUS 22 UNITS NIGHTLY  ONLY USES HER HUMALOG WHEN HER BLOOD SUGARS ARE HIGH- WHEN SHE GOES OUT TO EAT MOST TIMES  SHE DOES NOT EXERCISE  DENIES  INCREASED THIRST - FREQUENT URINATION OR BLURRY VISION NOTED   EYE EXAM DUE   SHE HAS NOT HAD ANY ISSUES WITH THE MEDICATION  SHE IS FASTING TODAY   DECLINES FLU - COVID - PNEUMOCOCCAL  VACCINES  COLONOSCOPY- MAMMOGRAM DECLINED  Review of Systems   Endocrine: Negative for polydipsia, polyphagia and polyuria. All other systems reviewed and are negative. Physical Exam  Vitals reviewed. Constitutional:       General: She is awake. She is not in acute distress. Appearance: Normal appearance. She is well-developed, well-groomed and normal weight. She is not ill-appearing, toxic-appearing or diaphoretic. Neck:      Vascular: No carotid bruit. Cardiovascular:      Rate and Rhythm: Normal rate and regular rhythm. Heart sounds: Normal heart sounds, S1 normal and S2 normal. Heart sounds not distant. No murmur heard. No systolic murmur is present. No diastolic murmur is present. No friction rub. No gallop. No S3 or S4 sounds. Pulmonary:      Effort: Pulmonary effort is normal.      Breath sounds: Normal breath sounds and air entry.    Feet:      Comments: Visual inspection:  Deformity/amputation: absent  Skin lesions/pre-ulcerative calluses: absent  Edema: right- negative, left- negative  Sensory exam:  Monofilament sensation: normal  (minimum of 5 random plantar locations tested, avoiding callused areas - > 1 area with absence of sensation is + for neuropathy)  Plus at least one of the following:  Pulses: normal, Pinprick: N/A    Neurological:      Mental Status: She is alert and oriented to person, place, and time. Psychiatric:         Attention and Perception: Attention and perception normal.         Mood and Affect: Mood and affect normal.         Speech: Speech normal.         Behavior: Behavior normal. Behavior is cooperative. Thought Content: Thought content normal.         Cognition and Memory: Cognition and memory normal.         Judgment: Judgment normal.         Wilmer Hogan was seen today for diabetes and other. Diagnoses and all orders for this visit:    Type 2 diabetes mellitus without complication, with long-term current use of insulin ([facility])  -     POCT glycosylated hemoglobin (Hb A1C) 6.5  -     HM DIABETES FOOT EXAM  -     POCT microalbumin  -     Comprehensive Metabolic Panel; Future  CONTINUE LANTUS 22 UNITS NIGHTLY  HUMALOG AS NEEDED  FOLLOW UP IN SIX MONTHS      Pain in both lower extremities  CONTINUE NEURONTIN    Mixed hyperlipidemia  CONTINUE CRESTOR  -     Lipid Panel; Future            An electronic signature was used to authenticate this note.     --eVronica Ruiz, HERIBERTO - CNP

## 2021-10-18 NOTE — PATIENT INSTRUCTIONS
Schedule your annual mammogram today! Its easy with [address] [place] [address] Mammography program, which has three mobile units offering you screening mammograms in 15 minutes at locations convenient to your home or workplace. For best coverage, please verify that [address] are in-network providers with your insurance carrier. If you are uninsured or underinsured (have high deductibles), we have financial need-based assistance programs available to help you. Call [phone] for more information. Please make your appointment (required) by calling [phone] or [phone] ([phone]). [facility], which provides advanced, compassionate, quality care in your neighborhood through its care network, announces the following mobile mammography screening dates at convenient locations near you in October:    [place],   [facility], [place], [address] [facility]  October 20, 2021, 8:30 a.m. [facility] [place], [address] [facility]  October 19, 2021, 9 a.m. [facility], [address] [facility]  [address] [facility], [address] [place] [address]  October 11, 2021, 8:30 a.m. [address], [employer]  [address] [place] [address], [place], [address] [facility]  October 16, 2021, [facility]e [facility]  [facility], [facility] [address]ias [address]  October 22, 2021, 8 a.m. [address], [facility], [address]  October 12, 2021, 8:30 a.m. Kelly Perez/ [facility]  [address], Ruchi Fernandez, [address]  October 6, 2021, 12:30 p.m. Kelly Bonilla  [address] [facility], [facility] 4098. [facility], [address] [facility]  October 1, 2021, 9:15 a.m. Kelly Munoz/ [facility]  [address] [place], [address]  October 14, 2021, 8 a.m. Farzana Munoz, [place], [address]  October 27, 2021, 8 a.m.     [facility]  [address]  October 19, 2021,1 p.m. Angelica, [employer] ([facility])  [address], [address] October 22, 2021, 8:30 a.m. [facility] [address] LISA VIGIL, [address] [facility] [address]  October 25, 2021, 8:30 a.m. Talk with your doctor about when you should have a screening mammogram. Screening mammograms are usually a covered benefit with most insurance carriers. Expert radiologists read all mammograms and because a second look can mean a second chance, we double-check all mammograms with the R2 ImageChecker, a computer-aided detection system that detects 23.4 percent more breast cancer than mammography alone. You and your physician receive a copy of the results.

## 2022-01-01 ENCOUNTER — CARE COORDINATION (OUTPATIENT)
Dept: CASE MANAGEMENT | Age: 67
End: 2022-01-01

## 2022-01-01 ENCOUNTER — APPOINTMENT (OUTPATIENT)
Dept: CT IMAGING | Age: 67
DRG: 639 | End: 2022-01-01
Payer: MEDICARE

## 2022-01-01 ENCOUNTER — APPOINTMENT (OUTPATIENT)
Dept: GENERAL RADIOLOGY | Age: 67
End: 2022-01-01
Payer: MEDICARE

## 2022-01-01 ENCOUNTER — HOSPITAL ENCOUNTER (OUTPATIENT)
Age: 67
Setting detail: OUTPATIENT SURGERY
Discharge: HOME | End: 2022-06-08
Attending: SURGERY | Admitting: SURGERY
Payer: MEDICARE

## 2022-01-01 ENCOUNTER — TELEPHONE (OUTPATIENT)
Dept: FAMILY MEDICINE CLINIC | Age: 67
End: 2022-01-01

## 2022-01-01 ENCOUNTER — APPOINTMENT (OUTPATIENT)
Dept: CT IMAGING | Age: 67
DRG: 853 | End: 2022-01-01
Payer: MEDICARE

## 2022-01-01 ENCOUNTER — APPOINTMENT (OUTPATIENT)
Dept: GENERAL RADIOLOGY | Age: 67
End: 2022-01-01
Attending: SURGERY
Payer: MEDICARE

## 2022-01-01 ENCOUNTER — APPOINTMENT (OUTPATIENT)
Dept: GENERAL RADIOLOGY | Age: 67
DRG: 639 | End: 2022-01-01
Payer: MEDICARE

## 2022-01-01 ENCOUNTER — HOSPITAL ENCOUNTER (OUTPATIENT)
Age: 67
Setting detail: OBSERVATION
Discharge: NURSING, REHAB OR LONG-TERM CARE | End: 2022-08-09
Attending: EMERGENCY MEDICINE | Admitting: INTERNAL MEDICINE
Payer: MEDICARE

## 2022-01-01 ENCOUNTER — APPOINTMENT (OUTPATIENT)
Dept: GENERAL RADIOLOGY | Age: 67
DRG: 853 | End: 2022-01-01
Payer: MEDICARE

## 2022-01-01 ENCOUNTER — HOSPITAL ENCOUNTER (INPATIENT)
Age: 67
LOS: 2 days | Discharge: HOME HEALTH | DRG: 639 | End: 2022-08-03
Attending: EMERGENCY MEDICINE | Admitting: STUDENT IN AN ORGANIZED HEALTH CARE EDUCATION/TRAINING PROGRAM
Payer: MEDICARE

## 2022-01-01 ENCOUNTER — OFFICE VISIT (OUTPATIENT)
Dept: FAMILY MEDICINE CLINIC | Age: 67
End: 2022-01-01
Payer: MEDICARE

## 2022-01-01 ENCOUNTER — APPOINTMENT (OUTPATIENT)
Dept: CT IMAGING | Age: 67
End: 2022-01-01
Payer: MEDICARE

## 2022-01-01 ENCOUNTER — ANESTHESIA EVENT (OUTPATIENT)
Dept: OPERATING ROOM | Age: 67
End: 2022-01-01
Payer: MEDICARE

## 2022-01-01 ENCOUNTER — ANESTHESIA (OUTPATIENT)
Dept: OPERATING ROOM | Age: 67
End: 2022-01-01
Payer: MEDICARE

## 2022-01-01 ENCOUNTER — HOSPITAL ENCOUNTER (INPATIENT)
Age: 67
LOS: 5 days | Discharge: HOME | DRG: 853 | End: 2022-06-03
Attending: EMERGENCY MEDICINE | Admitting: INTERNAL MEDICINE
Payer: MEDICARE

## 2022-01-01 ENCOUNTER — APPOINTMENT (OUTPATIENT)
Dept: ULTRASOUND IMAGING | Age: 67
DRG: 853 | End: 2022-01-01
Payer: MEDICARE

## 2022-01-01 VITALS
WEIGHT: 126 LBS | RESPIRATION RATE: 18 BRPM | HEART RATE: 80 BPM | BODY MASS INDEX: 23.79 KG/M2 | HEIGHT: 61 IN | SYSTOLIC BLOOD PRESSURE: 122 MMHG | DIASTOLIC BLOOD PRESSURE: 70 MMHG | TEMPERATURE: 98 F | OXYGEN SATURATION: 98 %

## 2022-01-01 VITALS
HEART RATE: 90 BPM | BODY MASS INDEX: 27.94 KG/M2 | OXYGEN SATURATION: 93 % | RESPIRATION RATE: 17 BRPM | TEMPERATURE: 98.7 F | DIASTOLIC BLOOD PRESSURE: 57 MMHG | HEIGHT: 61 IN | SYSTOLIC BLOOD PRESSURE: 114 MMHG | WEIGHT: 148 LBS

## 2022-01-01 VITALS
DIASTOLIC BLOOD PRESSURE: 54 MMHG | BODY MASS INDEX: 24.14 KG/M2 | TEMPERATURE: 97.8 F | HEART RATE: 83 BPM | SYSTOLIC BLOOD PRESSURE: 108 MMHG | OXYGEN SATURATION: 93 % | RESPIRATION RATE: 23 BRPM | WEIGHT: 127.87 LBS | HEIGHT: 61 IN

## 2022-01-01 VITALS
WEIGHT: 148.59 LBS | RESPIRATION RATE: 18 BRPM | HEIGHT: 61 IN | BODY MASS INDEX: 28.05 KG/M2 | DIASTOLIC BLOOD PRESSURE: 59 MMHG | HEART RATE: 98 BPM | TEMPERATURE: 98 F | OXYGEN SATURATION: 95 % | SYSTOLIC BLOOD PRESSURE: 117 MMHG

## 2022-01-01 VITALS
HEIGHT: 61 IN | DIASTOLIC BLOOD PRESSURE: 60 MMHG | OXYGEN SATURATION: 98 % | TEMPERATURE: 98.2 F | HEART RATE: 96 BPM | WEIGHT: 128.8 LBS | BODY MASS INDEX: 24.32 KG/M2 | RESPIRATION RATE: 16 BRPM | SYSTOLIC BLOOD PRESSURE: 110 MMHG

## 2022-01-01 DIAGNOSIS — T45.1X5A IMMUNODEFICIENCY DUE TO CHEMOTHERAPY (HCC): Primary | ICD-10-CM

## 2022-01-01 DIAGNOSIS — Z79.4 TYPE 2 DIABETES MELLITUS WITH DIABETIC NEUROPATHY, WITH LONG-TERM CURRENT USE OF INSULIN (HCC): Primary | ICD-10-CM

## 2022-01-01 DIAGNOSIS — D84.821 IMMUNODEFICIENCY DUE TO CHEMOTHERAPY (HCC): Primary | ICD-10-CM

## 2022-01-01 DIAGNOSIS — J91.0 MALIGNANT PLEURAL EFFUSION: ICD-10-CM

## 2022-01-01 DIAGNOSIS — E83.52 HYPERCALCEMIA: ICD-10-CM

## 2022-01-01 DIAGNOSIS — N90.89 VULVAR MASS: ICD-10-CM

## 2022-01-01 DIAGNOSIS — R09.02 HYPOXIA: ICD-10-CM

## 2022-01-01 DIAGNOSIS — R65.20 SEVERE SEPSIS (HCC): Primary | ICD-10-CM

## 2022-01-01 DIAGNOSIS — R00.0 TACHYCARDIA: Primary | ICD-10-CM

## 2022-01-01 DIAGNOSIS — D64.9 ANEMIA, UNSPECIFIED TYPE: ICD-10-CM

## 2022-01-01 DIAGNOSIS — E16.2 HYPOGLYCEMIA: Primary | ICD-10-CM

## 2022-01-01 DIAGNOSIS — Z79.899 IMMUNODEFICIENCY DUE TO CHEMOTHERAPY (HCC): Primary | ICD-10-CM

## 2022-01-01 DIAGNOSIS — T68.XXXA HYPOTHERMIA, INITIAL ENCOUNTER: ICD-10-CM

## 2022-01-01 DIAGNOSIS — E11.40 TYPE 2 DIABETES MELLITUS WITH DIABETIC NEUROPATHY, WITH LONG-TERM CURRENT USE OF INSULIN (HCC): Primary | ICD-10-CM

## 2022-01-01 DIAGNOSIS — A41.9 SEVERE SEPSIS (HCC): Primary | ICD-10-CM

## 2022-01-01 DIAGNOSIS — R53.1 GENERALIZED WEAKNESS: ICD-10-CM

## 2022-01-01 DIAGNOSIS — Z79.4 TYPE 2 DIABETES MELLITUS WITH DIABETIC NEUROPATHY, WITH LONG-TERM CURRENT USE OF INSULIN (HCC): ICD-10-CM

## 2022-01-01 DIAGNOSIS — N39.0 URINARY TRACT INFECTION WITHOUT HEMATURIA, SITE UNSPECIFIED: ICD-10-CM

## 2022-01-01 DIAGNOSIS — N90.89 VULVAR MASS: Primary | ICD-10-CM

## 2022-01-01 DIAGNOSIS — E11.40 TYPE 2 DIABETES MELLITUS WITH DIABETIC NEUROPATHY, WITH LONG-TERM CURRENT USE OF INSULIN (HCC): ICD-10-CM

## 2022-01-01 DIAGNOSIS — R55 SYNCOPE AND COLLAPSE: ICD-10-CM

## 2022-01-01 DIAGNOSIS — C51.9 VULVAR CANCER (HCC): ICD-10-CM

## 2022-01-01 LAB
A/G RATIO: 0.6 (ref 1.1–2.2)
A/G RATIO: 0.6 (ref 1.1–2.2)
A/G RATIO: 0.8 (ref 1.1–2.2)
A/G RATIO: 0.9 (ref 1.1–2.2)
A/G RATIO: 1 (ref 1.1–2.2)
A/G RATIO: 1.1 (ref 1.1–2.2)
ABO/RH: NORMAL
ALBUMIN SERPL-MCNC: 1.7 G/DL (ref 3.4–5)
ALBUMIN SERPL-MCNC: 1.8 G/DL (ref 3.4–5)
ALBUMIN SERPL-MCNC: 1.9 G/DL (ref 3.4–5)
ALBUMIN SERPL-MCNC: 2 G/DL (ref 3.4–5)
ALBUMIN SERPL-MCNC: 2.1 G/DL (ref 3.4–5)
ALBUMIN SERPL-MCNC: 2.1 G/DL (ref 3.4–5)
ALBUMIN SERPL-MCNC: 2.3 G/DL (ref 3.4–5)
ALBUMIN SERPL-MCNC: 2.3 G/DL (ref 3.4–5)
ALBUMIN SERPL-MCNC: 2.6 G/DL (ref 3.4–5)
ALBUMIN SERPL-MCNC: 2.8 G/DL (ref 3.4–5)
ALBUMIN SERPL-MCNC: 3.1 G/DL (ref 3.4–5)
ALP BLD-CCNC: 132 U/L (ref 40–129)
ALP BLD-CCNC: 77 U/L (ref 40–129)
ALP BLD-CCNC: 78 U/L (ref 40–129)
ALP BLD-CCNC: 78 U/L (ref 40–129)
ALP BLD-CCNC: 80 U/L (ref 40–129)
ALP BLD-CCNC: 82 U/L (ref 40–129)
ALP BLD-CCNC: 84 U/L (ref 40–129)
ALP BLD-CCNC: 84 U/L (ref 40–129)
ALP BLD-CCNC: 98 U/L (ref 40–129)
ALP BLD-CCNC: 99 U/L (ref 40–129)
ALT SERPL-CCNC: 16 U/L (ref 10–40)
ALT SERPL-CCNC: 17 U/L (ref 10–40)
ALT SERPL-CCNC: 20 U/L (ref 10–40)
ALT SERPL-CCNC: 21 U/L (ref 10–40)
ALT SERPL-CCNC: 23 U/L (ref 10–40)
ALT SERPL-CCNC: 28 U/L (ref 10–40)
ALT SERPL-CCNC: 37 U/L (ref 10–40)
ALT SERPL-CCNC: 42 U/L (ref 10–40)
ALT SERPL-CCNC: 53 U/L (ref 10–40)
ALT SERPL-CCNC: 71 U/L (ref 10–40)
ANION GAP SERPL CALCULATED.3IONS-SCNC: 10 MMOL/L (ref 3–16)
ANION GAP SERPL CALCULATED.3IONS-SCNC: 10 MMOL/L (ref 3–16)
ANION GAP SERPL CALCULATED.3IONS-SCNC: 11 MMOL/L (ref 3–16)
ANION GAP SERPL CALCULATED.3IONS-SCNC: 12 MMOL/L (ref 3–16)
ANION GAP SERPL CALCULATED.3IONS-SCNC: 6 MMOL/L (ref 3–16)
ANION GAP SERPL CALCULATED.3IONS-SCNC: 7 MMOL/L (ref 3–16)
ANION GAP SERPL CALCULATED.3IONS-SCNC: 7 MMOL/L (ref 3–16)
ANION GAP SERPL CALCULATED.3IONS-SCNC: 8 MMOL/L (ref 3–16)
ANION GAP SERPL CALCULATED.3IONS-SCNC: 8 MMOL/L (ref 3–16)
ANION GAP SERPL CALCULATED.3IONS-SCNC: 9 MMOL/L (ref 3–16)
ANTIBODY SCREEN: NORMAL
AST SERPL-CCNC: 108 U/L (ref 15–37)
AST SERPL-CCNC: 14 U/L (ref 15–37)
AST SERPL-CCNC: 14 U/L (ref 15–37)
AST SERPL-CCNC: 17 U/L (ref 15–37)
AST SERPL-CCNC: 18 U/L (ref 15–37)
AST SERPL-CCNC: 21 U/L (ref 15–37)
AST SERPL-CCNC: 22 U/L (ref 15–37)
AST SERPL-CCNC: 26 U/L (ref 15–37)
AST SERPL-CCNC: 39 U/L (ref 15–37)
AST SERPL-CCNC: 72 U/L (ref 15–37)
BACTERIA: ABNORMAL /HPF
BASOPHILS ABSOLUTE: 0 K/UL (ref 0–0.2)
BASOPHILS ABSOLUTE: 0.1 K/UL (ref 0–0.2)
BASOPHILS RELATIVE PERCENT: 0.2 %
BASOPHILS RELATIVE PERCENT: 0.3 %
BASOPHILS RELATIVE PERCENT: 0.5 %
BASOPHILS RELATIVE PERCENT: 0.6 %
BILIRUB SERPL-MCNC: 0.3 MG/DL (ref 0–1)
BILIRUB SERPL-MCNC: 0.4 MG/DL (ref 0–1)
BILIRUB SERPL-MCNC: <0.2 MG/DL (ref 0–1)
BILIRUBIN DIRECT: <0.2 MG/DL (ref 0–0.3)
BILIRUBIN DIRECT: <0.2 MG/DL (ref 0–0.3)
BILIRUBIN URINE: NEGATIVE
BILIRUBIN, INDIRECT: ABNORMAL MG/DL (ref 0–1)
BILIRUBIN, INDIRECT: ABNORMAL MG/DL (ref 0–1)
BLOOD BANK DISPENSE STATUS: NORMAL
BLOOD BANK DISPENSE STATUS: NORMAL
BLOOD BANK PRODUCT CODE: NORMAL
BLOOD BANK PRODUCT CODE: NORMAL
BLOOD CULTURE, ROUTINE: NORMAL
BLOOD CULTURE, ROUTINE: NORMAL
BLOOD, URINE: ABNORMAL
BODY FLUID CULTURE, STERILE: NORMAL
BPU ID: NORMAL
BPU ID: NORMAL
BUN BLDV-MCNC: 10 MG/DL (ref 7–20)
BUN BLDV-MCNC: 11 MG/DL (ref 7–20)
BUN BLDV-MCNC: 18 MG/DL (ref 7–20)
BUN BLDV-MCNC: 18 MG/DL (ref 7–20)
BUN BLDV-MCNC: 19 MG/DL (ref 7–20)
BUN BLDV-MCNC: 24 MG/DL (ref 7–20)
BUN BLDV-MCNC: 25 MG/DL (ref 7–20)
BUN BLDV-MCNC: 8 MG/DL (ref 7–20)
BUN BLDV-MCNC: 9 MG/DL (ref 7–20)
BUN BLDV-MCNC: 9 MG/DL (ref 7–20)
CALCIUM IONIZED: 0.91 MMOL/L (ref 1.12–1.32)
CALCIUM SERPL-MCNC: 10.4 MG/DL (ref 8.3–10.6)
CALCIUM SERPL-MCNC: 10.7 MG/DL (ref 8.3–10.6)
CALCIUM SERPL-MCNC: 13.9 MG/DL (ref 8.3–10.6)
CALCIUM SERPL-MCNC: 5.9 MG/DL (ref 8.3–10.6)
CALCIUM SERPL-MCNC: 6.1 MG/DL (ref 8.3–10.6)
CALCIUM SERPL-MCNC: 6.5 MG/DL (ref 8.3–10.6)
CALCIUM SERPL-MCNC: 7 MG/DL (ref 8.3–10.6)
CALCIUM SERPL-MCNC: 7 MG/DL (ref 8.3–10.6)
CALCIUM SERPL-MCNC: 7.1 MG/DL (ref 8.3–10.6)
CALCIUM SERPL-MCNC: 8.1 MG/DL (ref 8.3–10.6)
CALCIUM SERPL-MCNC: 8.4 MG/DL (ref 8.3–10.6)
CALCIUM SERPL-MCNC: 9.7 MG/DL (ref 8.3–10.6)
CHLORIDE BLD-SCNC: 100 MMOL/L (ref 99–110)
CHLORIDE BLD-SCNC: 101 MMOL/L (ref 99–110)
CHLORIDE BLD-SCNC: 102 MMOL/L (ref 99–110)
CHLORIDE BLD-SCNC: 102 MMOL/L (ref 99–110)
CHLORIDE BLD-SCNC: 104 MMOL/L (ref 99–110)
CHLORIDE BLD-SCNC: 105 MMOL/L (ref 99–110)
CHLORIDE BLD-SCNC: 107 MMOL/L (ref 99–110)
CHLORIDE BLD-SCNC: 107 MMOL/L (ref 99–110)
CHLORIDE BLD-SCNC: 98 MMOL/L (ref 99–110)
CHLORIDE BLD-SCNC: 99 MMOL/L (ref 99–110)
CLARITY: ABNORMAL
CO2: 22 MMOL/L (ref 21–32)
CO2: 24 MMOL/L (ref 21–32)
CO2: 24 MMOL/L (ref 21–32)
CO2: 25 MMOL/L (ref 21–32)
CO2: 26 MMOL/L (ref 21–32)
CO2: 26 MMOL/L (ref 21–32)
CO2: 27 MMOL/L (ref 21–32)
CO2: 28 MMOL/L (ref 21–32)
CO2: 28 MMOL/L (ref 21–32)
CO2: 29 MMOL/L (ref 21–32)
COLOR: YELLOW
COMMENT UA: ABNORMAL
CREAT SERPL-MCNC: 0.5 MG/DL (ref 0.6–1.2)
CREAT SERPL-MCNC: 0.6 MG/DL (ref 0.6–1.2)
CREAT SERPL-MCNC: 0.6 MG/DL (ref 0.6–1.2)
CREAT SERPL-MCNC: 0.8 MG/DL (ref 0.6–1.2)
CREAT SERPL-MCNC: 0.8 MG/DL (ref 0.6–1.2)
CREAT SERPL-MCNC: <0.5 MG/DL (ref 0.6–1.2)
CRYSTALS, UA: ABNORMAL /HPF
CULTURE, BLOOD 2: NORMAL
CULTURE, BLOOD 2: NORMAL
DESCRIPTION BLOOD BANK: NORMAL
DESCRIPTION BLOOD BANK: NORMAL
EKG ATRIAL RATE: 70 BPM
EKG ATRIAL RATE: 78 BPM
EKG ATRIAL RATE: 85 BPM
EKG DIAGNOSIS: NORMAL
EKG P AXIS: 66 DEGREES
EKG P AXIS: 74 DEGREES
EKG P AXIS: 74 DEGREES
EKG P-R INTERVAL: 106 MS
EKG P-R INTERVAL: 126 MS
EKG P-R INTERVAL: 136 MS
EKG Q-T INTERVAL: 396 MS
EKG Q-T INTERVAL: 404 MS
EKG Q-T INTERVAL: 440 MS
EKG QRS DURATION: 102 MS
EKG QRS DURATION: 84 MS
EKG QRS DURATION: 92 MS
EKG QTC CALCULATION (BAZETT): 460 MS
EKG QTC CALCULATION (BAZETT): 471 MS
EKG QTC CALCULATION (BAZETT): 475 MS
EKG R AXIS: 29 DEGREES
EKG R AXIS: 32 DEGREES
EKG R AXIS: 41 DEGREES
EKG T AXIS: 14 DEGREES
EKG T AXIS: 24 DEGREES
EKG T AXIS: 38 DEGREES
EKG VENTRICULAR RATE: 70 BPM
EKG VENTRICULAR RATE: 78 BPM
EKG VENTRICULAR RATE: 85 BPM
EOSINOPHILS ABSOLUTE: 0 K/UL (ref 0–0.6)
EOSINOPHILS ABSOLUTE: 0.1 K/UL (ref 0–0.6)
EOSINOPHILS ABSOLUTE: 0.2 K/UL (ref 0–0.6)
EOSINOPHILS ABSOLUTE: 0.2 K/UL (ref 0–0.6)
EOSINOPHILS ABSOLUTE: 0.3 K/UL (ref 0–0.6)
EOSINOPHILS RELATIVE PERCENT: 0.1 %
EOSINOPHILS RELATIVE PERCENT: 0.3 %
EOSINOPHILS RELATIVE PERCENT: 0.5 %
EOSINOPHILS RELATIVE PERCENT: 0.5 %
EOSINOPHILS RELATIVE PERCENT: 0.7 %
EOSINOPHILS RELATIVE PERCENT: 0.9 %
EOSINOPHILS RELATIVE PERCENT: 1.2 %
EOSINOPHILS RELATIVE PERCENT: 1.2 %
EOSINOPHILS RELATIVE PERCENT: 1.5 %
EPITHELIAL CELLS, UA: 14 /HPF (ref 0–5)
EPITHELIAL CELLS, UA: 3 /HPF (ref 0–5)
EPITHELIAL CELLS, UA: ABNORMAL /HPF (ref 0–5)
ESTIMATED AVERAGE GLUCOSE: 116.9 MG/DL
FERRITIN: 209.2 NG/ML (ref 15–150)
FLUID TYPE: NORMAL
GFR AFRICAN AMERICAN: >60
GFR NON-AFRICAN AMERICAN: >60
GLUCOSE BLD-MCNC: 106 MG/DL (ref 70–99)
GLUCOSE BLD-MCNC: 106 MG/DL (ref 70–99)
GLUCOSE BLD-MCNC: 109 MG/DL (ref 70–99)
GLUCOSE BLD-MCNC: 109 MG/DL (ref 70–99)
GLUCOSE BLD-MCNC: 111 MG/DL (ref 70–99)
GLUCOSE BLD-MCNC: 113 MG/DL (ref 70–99)
GLUCOSE BLD-MCNC: 114 MG/DL (ref 70–99)
GLUCOSE BLD-MCNC: 117 MG/DL (ref 70–99)
GLUCOSE BLD-MCNC: 118 MG/DL (ref 70–99)
GLUCOSE BLD-MCNC: 121 MG/DL (ref 70–99)
GLUCOSE BLD-MCNC: 126 MG/DL (ref 70–99)
GLUCOSE BLD-MCNC: 128 MG/DL (ref 70–99)
GLUCOSE BLD-MCNC: 128 MG/DL (ref 70–99)
GLUCOSE BLD-MCNC: 131 MG/DL (ref 70–99)
GLUCOSE BLD-MCNC: 137 MG/DL (ref 70–99)
GLUCOSE BLD-MCNC: 137 MG/DL (ref 70–99)
GLUCOSE BLD-MCNC: 140 MG/DL (ref 70–99)
GLUCOSE BLD-MCNC: 140 MG/DL (ref 70–99)
GLUCOSE BLD-MCNC: 142 MG/DL (ref 70–99)
GLUCOSE BLD-MCNC: 150 MG/DL (ref 70–99)
GLUCOSE BLD-MCNC: 163 MG/DL (ref 70–99)
GLUCOSE BLD-MCNC: 165 MG/DL (ref 70–99)
GLUCOSE BLD-MCNC: 166 MG/DL (ref 70–99)
GLUCOSE BLD-MCNC: 166 MG/DL (ref 70–99)
GLUCOSE BLD-MCNC: 174 MG/DL (ref 70–99)
GLUCOSE BLD-MCNC: 175 MG/DL (ref 70–99)
GLUCOSE BLD-MCNC: 182 MG/DL (ref 70–99)
GLUCOSE BLD-MCNC: 184 MG/DL (ref 70–99)
GLUCOSE BLD-MCNC: 185 MG/DL (ref 70–99)
GLUCOSE BLD-MCNC: 188 MG/DL (ref 70–99)
GLUCOSE BLD-MCNC: 189 MG/DL (ref 70–99)
GLUCOSE BLD-MCNC: 196 MG/DL (ref 70–99)
GLUCOSE BLD-MCNC: 200 MG/DL (ref 70–99)
GLUCOSE BLD-MCNC: 206 MG/DL (ref 70–99)
GLUCOSE BLD-MCNC: 223 MG/DL (ref 70–99)
GLUCOSE BLD-MCNC: 230 MG/DL (ref 70–99)
GLUCOSE BLD-MCNC: 274 MG/DL (ref 70–99)
GLUCOSE BLD-MCNC: 275 MG/DL (ref 70–99)
GLUCOSE BLD-MCNC: 312 MG/DL (ref 70–99)
GLUCOSE BLD-MCNC: 38 MG/DL (ref 70–99)
GLUCOSE BLD-MCNC: 46 MG/DL (ref 70–99)
GLUCOSE BLD-MCNC: 61 MG/DL (ref 70–99)
GLUCOSE BLD-MCNC: 70 MG/DL (ref 70–99)
GLUCOSE BLD-MCNC: 71 MG/DL (ref 70–99)
GLUCOSE BLD-MCNC: 72 MG/DL (ref 70–99)
GLUCOSE BLD-MCNC: 78 MG/DL (ref 70–99)
GLUCOSE BLD-MCNC: 81 MG/DL (ref 70–99)
GLUCOSE BLD-MCNC: 85 MG/DL (ref 70–99)
GLUCOSE BLD-MCNC: 87 MG/DL (ref 70–99)
GLUCOSE BLD-MCNC: 90 MG/DL (ref 70–99)
GLUCOSE BLD-MCNC: 93 MG/DL (ref 70–99)
GLUCOSE BLD-MCNC: 94 MG/DL (ref 70–99)
GLUCOSE BLD-MCNC: 98 MG/DL (ref 70–99)
GLUCOSE BLD-MCNC: 99 MG/DL (ref 70–99)
GLUCOSE URINE: 100 MG/DL
GLUCOSE URINE: NEGATIVE MG/DL
GLUCOSE URINE: NEGATIVE MG/DL
GLUCOSE, FLUID: 69 MG/DL
GRAM STAIN RESULT: NORMAL
HBA1C MFR BLD: 5.7 %
HBA1C MFR BLD: 6 %
HCT VFR BLD CALC: 19 % (ref 36–48)
HCT VFR BLD CALC: 21.6 % (ref 36–48)
HCT VFR BLD CALC: 23.1 % (ref 36–48)
HCT VFR BLD CALC: 24 % (ref 36–48)
HCT VFR BLD CALC: 25.3 % (ref 36–48)
HCT VFR BLD CALC: 25.3 % (ref 36–48)
HCT VFR BLD CALC: 26.6 % (ref 36–48)
HCT VFR BLD CALC: 27 % (ref 36–48)
HCT VFR BLD CALC: 27.6 % (ref 36–48)
HCT VFR BLD CALC: 28.6 % (ref 36–48)
HCT VFR BLD CALC: 29.4 % (ref 36–48)
HCT VFR BLD CALC: 36.3 % (ref 36–48)
HEMOGLOBIN: 11 G/DL (ref 12–16)
HEMOGLOBIN: 6 G/DL (ref 12–16)
HEMOGLOBIN: 6.9 G/DL (ref 12–16)
HEMOGLOBIN: 7.2 G/DL (ref 12–16)
HEMOGLOBIN: 7.9 G/DL (ref 12–16)
HEMOGLOBIN: 8.1 G/DL (ref 12–16)
HEMOGLOBIN: 8.2 G/DL (ref 12–16)
HEMOGLOBIN: 8.4 G/DL (ref 12–16)
HEMOGLOBIN: 8.5 G/DL (ref 12–16)
HEMOGLOBIN: 8.5 G/DL (ref 12–16)
HEMOGLOBIN: 8.7 G/DL (ref 12–16)
HEMOGLOBIN: 9.1 G/DL (ref 12–16)
HYALINE CASTS: 17 /LPF (ref 0–8)
HYALINE CASTS: 2 /LPF (ref 0–8)
INR BLD: 1.15 (ref 0.87–1.14)
INR BLD: 1.18 (ref 0.87–1.14)
IRON SATURATION: 9 % (ref 15–50)
IRON: 10 UG/DL (ref 37–145)
KETONES, URINE: 15 MG/DL
KETONES, URINE: NEGATIVE MG/DL
KETONES, URINE: NEGATIVE MG/DL
LACTIC ACID, SEPSIS: 1.2 MMOL/L (ref 0.4–1.9)
LACTIC ACID, SEPSIS: 2 MMOL/L (ref 0.4–1.9)
LACTIC ACID: 1 MMOL/L (ref 0.4–2)
LACTIC ACID: 1.5 MMOL/L (ref 0.4–2)
LD, FLUID: 533 U/L
LEUKOCYTE ESTERASE, URINE: ABNORMAL
LYMPHOCYTES ABSOLUTE: 0.2 K/UL (ref 1–5.1)
LYMPHOCYTES ABSOLUTE: 0.2 K/UL (ref 1–5.1)
LYMPHOCYTES ABSOLUTE: 0.3 K/UL (ref 1–5.1)
LYMPHOCYTES ABSOLUTE: 1.8 K/UL (ref 1–5.1)
LYMPHOCYTES ABSOLUTE: 2.1 K/UL (ref 1–5.1)
LYMPHOCYTES ABSOLUTE: 2.2 K/UL (ref 1–5.1)
LYMPHOCYTES ABSOLUTE: 2.3 K/UL (ref 1–5.1)
LYMPHOCYTES ABSOLUTE: 2.3 K/UL (ref 1–5.1)
LYMPHOCYTES ABSOLUTE: 2.6 K/UL (ref 1–5.1)
LYMPHOCYTES RELATIVE PERCENT: 10.2 %
LYMPHOCYTES RELATIVE PERCENT: 11.4 %
LYMPHOCYTES RELATIVE PERCENT: 11.8 %
LYMPHOCYTES RELATIVE PERCENT: 2 %
LYMPHOCYTES RELATIVE PERCENT: 2.9 %
LYMPHOCYTES RELATIVE PERCENT: 3.9 %
LYMPHOCYTES RELATIVE PERCENT: 8 %
LYMPHOCYTES RELATIVE PERCENT: 9.2 %
LYMPHOCYTES RELATIVE PERCENT: 9.6 %
MAGNESIUM: 0.9 MG/DL (ref 1.8–2.4)
MAGNESIUM: 1.1 MG/DL (ref 1.8–2.4)
MAGNESIUM: 1.4 MG/DL (ref 1.8–2.4)
MAGNESIUM: 1.6 MG/DL (ref 1.8–2.4)
MAGNESIUM: 1.6 MG/DL (ref 1.8–2.4)
MAGNESIUM: 1.8 MG/DL (ref 1.8–2.4)
MCH RBC QN AUTO: 25.1 PG (ref 26–34)
MCH RBC QN AUTO: 25.2 PG (ref 26–34)
MCH RBC QN AUTO: 25.3 PG (ref 26–34)
MCH RBC QN AUTO: 25.4 PG (ref 26–34)
MCH RBC QN AUTO: 25.4 PG (ref 26–34)
MCH RBC QN AUTO: 25.6 PG (ref 26–34)
MCH RBC QN AUTO: 28.4 PG (ref 26–34)
MCH RBC QN AUTO: 28.7 PG (ref 26–34)
MCH RBC QN AUTO: 28.9 PG (ref 26–34)
MCH RBC QN AUTO: 29 PG (ref 26–34)
MCH RBC QN AUTO: 29.1 PG (ref 26–34)
MCHC RBC AUTO-ENTMCNC: 30.3 G/DL (ref 31–36)
MCHC RBC AUTO-ENTMCNC: 30.4 G/DL (ref 31–36)
MCHC RBC AUTO-ENTMCNC: 30.6 G/DL (ref 31–36)
MCHC RBC AUTO-ENTMCNC: 31 G/DL (ref 31–36)
MCHC RBC AUTO-ENTMCNC: 31.4 G/DL (ref 31–36)
MCHC RBC AUTO-ENTMCNC: 31.5 G/DL (ref 31–36)
MCHC RBC AUTO-ENTMCNC: 31.5 G/DL (ref 31–36)
MCHC RBC AUTO-ENTMCNC: 32.1 G/DL (ref 31–36)
MCHC RBC AUTO-ENTMCNC: 32.1 G/DL (ref 31–36)
MCHC RBC AUTO-ENTMCNC: 32.4 G/DL (ref 31–36)
MCHC RBC AUTO-ENTMCNC: 32.8 G/DL (ref 31–36)
MCV RBC AUTO: 81 FL (ref 80–100)
MCV RBC AUTO: 81.1 FL (ref 80–100)
MCV RBC AUTO: 81.9 FL (ref 80–100)
MCV RBC AUTO: 82.7 FL (ref 80–100)
MCV RBC AUTO: 83 FL (ref 80–100)
MCV RBC AUTO: 83 FL (ref 80–100)
MCV RBC AUTO: 88.3 FL (ref 80–100)
MCV RBC AUTO: 88.7 FL (ref 80–100)
MCV RBC AUTO: 90.1 FL (ref 80–100)
MCV RBC AUTO: 90.2 FL (ref 80–100)
MCV RBC AUTO: 90.7 FL (ref 80–100)
MICROSCOPIC EXAMINATION: YES
MONOCYTES ABSOLUTE: 0.5 K/UL (ref 0–1.3)
MONOCYTES ABSOLUTE: 0.6 K/UL (ref 0–1.3)
MONOCYTES ABSOLUTE: 0.6 K/UL (ref 0–1.3)
MONOCYTES ABSOLUTE: 0.9 K/UL (ref 0–1.3)
MONOCYTES ABSOLUTE: 1 K/UL (ref 0–1.3)
MONOCYTES ABSOLUTE: 1 K/UL (ref 0–1.3)
MONOCYTES ABSOLUTE: 1.1 K/UL (ref 0–1.3)
MONOCYTES RELATIVE PERCENT: 4.2 %
MONOCYTES RELATIVE PERCENT: 4.4 %
MONOCYTES RELATIVE PERCENT: 4.5 %
MONOCYTES RELATIVE PERCENT: 4.6 %
MONOCYTES RELATIVE PERCENT: 4.8 %
MONOCYTES RELATIVE PERCENT: 5.5 %
MONOCYTES RELATIVE PERCENT: 6.1 %
MONOCYTES RELATIVE PERCENT: 7.3 %
MONOCYTES RELATIVE PERCENT: 7.4 %
MUCUS: PRESENT
NEUTROPHILS ABSOLUTE: 15.9 K/UL (ref 1.7–7.7)
NEUTROPHILS ABSOLUTE: 16.7 K/UL (ref 1.7–7.7)
NEUTROPHILS ABSOLUTE: 17.8 K/UL (ref 1.7–7.7)
NEUTROPHILS ABSOLUTE: 19.3 K/UL (ref 1.7–7.7)
NEUTROPHILS ABSOLUTE: 20.4 K/UL (ref 1.7–7.7)
NEUTROPHILS ABSOLUTE: 21.7 K/UL (ref 1.7–7.7)
NEUTROPHILS ABSOLUTE: 6.8 K/UL (ref 1.7–7.7)
NEUTROPHILS ABSOLUTE: 7 K/UL (ref 1.7–7.7)
NEUTROPHILS ABSOLUTE: 7.8 K/UL (ref 1.7–7.7)
NEUTROPHILS RELATIVE PERCENT: 81.9 %
NEUTROPHILS RELATIVE PERCENT: 82 %
NEUTROPHILS RELATIVE PERCENT: 82.8 %
NEUTROPHILS RELATIVE PERCENT: 84.9 %
NEUTROPHILS RELATIVE PERCENT: 86 %
NEUTROPHILS RELATIVE PERCENT: 86.2 %
NEUTROPHILS RELATIVE PERCENT: 87.9 %
NEUTROPHILS RELATIVE PERCENT: 90.3 %
NEUTROPHILS RELATIVE PERCENT: 90.4 %
NITRITE, URINE: NEGATIVE
ORGANISM: ABNORMAL
PARATHYROID HORMONE INTACT: 152.7 PG/ML (ref 14–72)
PDW BLD-RTO: 16.2 % (ref 12.4–15.4)
PDW BLD-RTO: 16.4 % (ref 12.4–15.4)
PDW BLD-RTO: 16.4 % (ref 12.4–15.4)
PDW BLD-RTO: 16.6 % (ref 12.4–15.4)
PDW BLD-RTO: 16.6 % (ref 12.4–15.4)
PDW BLD-RTO: 16.8 % (ref 12.4–15.4)
PDW BLD-RTO: 22.4 % (ref 12.4–15.4)
PDW BLD-RTO: 25 % (ref 12.4–15.4)
PDW BLD-RTO: 27.6 % (ref 12.4–15.4)
PDW BLD-RTO: 27.7 % (ref 12.4–15.4)
PDW BLD-RTO: 28 % (ref 12.4–15.4)
PERFORMED ON: ABNORMAL
PERFORMED ON: NORMAL
PH UA: 5 (ref 5–8)
PH VENOUS: 7.47 (ref 7.35–7.45)
PHOSPHORUS: 1.5 MG/DL (ref 2.5–4.9)
PHOSPHORUS: 1.9 MG/DL (ref 2.5–4.9)
PLATELET # BLD: 110 K/UL (ref 135–450)
PLATELET # BLD: 314 K/UL (ref 135–450)
PLATELET # BLD: 318 K/UL (ref 135–450)
PLATELET # BLD: 326 K/UL (ref 135–450)
PLATELET # BLD: 332 K/UL (ref 135–450)
PLATELET # BLD: 381 K/UL (ref 135–450)
PLATELET # BLD: 465 K/UL (ref 135–450)
PLATELET # BLD: 47 K/UL (ref 135–450)
PLATELET # BLD: 53 K/UL (ref 135–450)
PLATELET # BLD: 57 K/UL (ref 135–450)
PLATELET # BLD: 93 K/UL (ref 135–450)
PMV BLD AUTO: 7.9 FL (ref 5–10.5)
PMV BLD AUTO: 7.9 FL (ref 5–10.5)
PMV BLD AUTO: 8 FL (ref 5–10.5)
PMV BLD AUTO: 8.1 FL (ref 5–10.5)
PMV BLD AUTO: 8.2 FL (ref 5–10.5)
PMV BLD AUTO: 8.5 FL (ref 5–10.5)
PMV BLD AUTO: 8.6 FL (ref 5–10.5)
PMV BLD AUTO: 8.7 FL (ref 5–10.5)
PMV BLD AUTO: 8.9 FL (ref 5–10.5)
POTASSIUM REFLEX MAGNESIUM: 2.6 MMOL/L (ref 3.5–5.1)
POTASSIUM REFLEX MAGNESIUM: 3 MMOL/L (ref 3.5–5.1)
POTASSIUM REFLEX MAGNESIUM: 3.1 MMOL/L (ref 3.5–5.1)
POTASSIUM REFLEX MAGNESIUM: 3.2 MMOL/L (ref 3.5–5.1)
POTASSIUM REFLEX MAGNESIUM: 3.6 MMOL/L (ref 3.5–5.1)
POTASSIUM REFLEX MAGNESIUM: 3.7 MMOL/L (ref 3.5–5.1)
POTASSIUM REFLEX MAGNESIUM: 3.8 MMOL/L (ref 3.5–5.1)
POTASSIUM REFLEX MAGNESIUM: 3.8 MMOL/L (ref 3.5–5.1)
POTASSIUM REFLEX MAGNESIUM: 4.1 MMOL/L (ref 3.5–5.1)
POTASSIUM SERPL-SCNC: 3 MMOL/L (ref 3.5–5.1)
POTASSIUM SERPL-SCNC: 3.3 MMOL/L (ref 3.5–5.1)
POTASSIUM SERPL-SCNC: 3.3 MMOL/L (ref 3.5–5.1)
POTASSIUM SERPL-SCNC: 3.8 MMOL/L (ref 3.5–5.1)
POTASSIUM SERPL-SCNC: 7.8 MMOL/L (ref 3.5–5.1)
PRO-BNP: 584 PG/ML (ref 0–124)
PRO-BNP: 610 PG/ML (ref 0–124)
PROTEIN FLUID: 2.6 G/DL
PROTEIN UA: 30 MG/DL
PROTHROMBIN TIME: 14.7 SEC (ref 11.7–14.5)
PROTHROMBIN TIME: 15 SEC (ref 11.7–14.5)
RBC # BLD: 2.1 M/UL (ref 4–5.2)
RBC # BLD: 2.38 M/UL (ref 4–5.2)
RBC # BLD: 2.72 M/UL (ref 4–5.2)
RBC # BLD: 2.8 M/UL (ref 4–5.2)
RBC # BLD: 2.85 M/UL (ref 4–5.2)
RBC # BLD: 3.29 M/UL (ref 4–5.2)
RBC # BLD: 3.34 M/UL (ref 4–5.2)
RBC # BLD: 3.34 M/UL (ref 4–5.2)
RBC # BLD: 3.45 M/UL (ref 4–5.2)
RBC # BLD: 3.59 M/UL (ref 4–5.2)
RBC # BLD: 4.37 M/UL (ref 4–5.2)
RBC UA: 169 /HPF (ref 0–4)
RBC UA: 75 /HPF (ref 0–4)
RBC UA: ABNORMAL /HPF (ref 0–4)
RENAL EPITHELIAL, UA: ABNORMAL /HPF (ref 0–1)
SARS-COV-2, PCR: NOT DETECTED
SLIDE REVIEW: ABNORMAL
SODIUM BLD-SCNC: 132 MMOL/L (ref 136–145)
SODIUM BLD-SCNC: 135 MMOL/L (ref 136–145)
SODIUM BLD-SCNC: 135 MMOL/L (ref 136–145)
SODIUM BLD-SCNC: 136 MMOL/L (ref 136–145)
SODIUM BLD-SCNC: 137 MMOL/L (ref 136–145)
SODIUM BLD-SCNC: 138 MMOL/L (ref 136–145)
SODIUM BLD-SCNC: 139 MMOL/L (ref 136–145)
SODIUM BLD-SCNC: 139 MMOL/L (ref 136–145)
SODIUM BLD-SCNC: 140 MMOL/L (ref 136–145)
SODIUM BLD-SCNC: 141 MMOL/L (ref 136–145)
SPECIFIC GRAVITY UA: 1.02 (ref 1–1.03)
SPECIFIC GRAVITY UA: 1.02 (ref 1–1.03)
SPECIFIC GRAVITY UA: 1.03 (ref 1–1.03)
TOTAL CK: 174 U/L (ref 26–192)
TOTAL CK: 61 U/L (ref 26–192)
TOTAL CK: 80 U/L (ref 26–192)
TOTAL IRON BINDING CAPACITY: 111 UG/DL (ref 260–445)
TOTAL PROTEIN: 4.4 G/DL (ref 6.4–8.2)
TOTAL PROTEIN: 4.6 G/DL (ref 6.4–8.2)
TOTAL PROTEIN: 4.6 G/DL (ref 6.4–8.2)
TOTAL PROTEIN: 4.8 G/DL (ref 6.4–8.2)
TOTAL PROTEIN: 4.9 G/DL (ref 6.4–8.2)
TOTAL PROTEIN: 5 G/DL (ref 6.4–8.2)
TOTAL PROTEIN: 5.6 G/DL (ref 6.4–8.2)
TOTAL PROTEIN: 7 G/DL (ref 6.4–8.2)
TROPONIN: 0.01 NG/ML
TROPONIN: 0.02 NG/ML
TROPONIN: 0.02 NG/ML
TROPONIN: 0.04 NG/ML
TROPONIN: 0.05 NG/ML
TROPONIN: <0.01 NG/ML
TROPONIN: <0.01 NG/ML
TSH REFLEX FT4: 1.2 UIU/ML (ref 0.27–4.2)
URINE CULTURE, ROUTINE: ABNORMAL
URINE REFLEX TO CULTURE: YES
URINE TYPE: ABNORMAL
UROBILINOGEN, URINE: 0.2 E.U./DL
VANCOMYCIN RANDOM: 11.1 UG/ML
WBC # BLD: 19.4 K/UL (ref 4–11)
WBC # BLD: 20.2 K/UL (ref 4–11)
WBC # BLD: 21.8 K/UL (ref 4–11)
WBC # BLD: 22.4 K/UL (ref 4–11)
WBC # BLD: 24 K/UL (ref 4–11)
WBC # BLD: 25.2 K/UL (ref 4–11)
WBC # BLD: 7.8 K/UL (ref 4–11)
WBC # BLD: 7.8 K/UL (ref 4–11)
WBC # BLD: 8.6 K/UL (ref 4–11)
WBC # BLD: 8.6 K/UL (ref 4–11)
WBC # BLD: 9 K/UL (ref 4–11)
WBC UA: 1694 /HPF (ref 0–5)
WBC UA: 289 /HPF (ref 0–5)
WBC UA: ABNORMAL /HPF (ref 0–5)

## 2022-01-01 PROCEDURE — 6370000000 HC RX 637 (ALT 250 FOR IP): Performed by: HOSPITALIST

## 2022-01-01 PROCEDURE — 2580000003 HC RX 258: Performed by: ANESTHESIOLOGY

## 2022-01-01 PROCEDURE — 97530 THERAPEUTIC ACTIVITIES: CPT

## 2022-01-01 PROCEDURE — 2500000003 HC RX 250 WO HCPCS: Performed by: INTERNAL MEDICINE

## 2022-01-01 PROCEDURE — 36561 INSERT TUNNELED CV CATH: CPT | Performed by: SURGERY

## 2022-01-01 PROCEDURE — 2500000003 HC RX 250 WO HCPCS: Performed by: HOSPITALIST

## 2022-01-01 PROCEDURE — 6370000000 HC RX 637 (ALT 250 FOR IP): Performed by: INTERNAL MEDICINE

## 2022-01-01 PROCEDURE — 2580000003 HC RX 258: Performed by: EMERGENCY MEDICINE

## 2022-01-01 PROCEDURE — 99233 SBSQ HOSP IP/OBS HIGH 50: CPT | Performed by: INTERNAL MEDICINE

## 2022-01-01 PROCEDURE — 2580000003 HC RX 258: Performed by: STUDENT IN AN ORGANIZED HEALTH CARE EDUCATION/TRAINING PROGRAM

## 2022-01-01 PROCEDURE — 80053 COMPREHEN METABOLIC PANEL: CPT

## 2022-01-01 PROCEDURE — 6370000000 HC RX 637 (ALT 250 FOR IP): Performed by: STUDENT IN AN ORGANIZED HEALTH CARE EDUCATION/TRAINING PROGRAM

## 2022-01-01 PROCEDURE — A4217 STERILE WATER/SALINE, 500 ML: HCPCS | Performed by: SURGERY

## 2022-01-01 PROCEDURE — 6370000000 HC RX 637 (ALT 250 FOR IP): Performed by: ANESTHESIOLOGY

## 2022-01-01 PROCEDURE — 83735 ASSAY OF MAGNESIUM: CPT

## 2022-01-01 PROCEDURE — 6360000002 HC RX W HCPCS: Performed by: INTERNAL MEDICINE

## 2022-01-01 PROCEDURE — 2580000003 HC RX 258: Performed by: INTERNAL MEDICINE

## 2022-01-01 PROCEDURE — 82550 ASSAY OF CK (CPK): CPT

## 2022-01-01 PROCEDURE — 83550 IRON BINDING TEST: CPT

## 2022-01-01 PROCEDURE — 6360000002 HC RX W HCPCS: Performed by: ANESTHESIOLOGY

## 2022-01-01 PROCEDURE — 85025 COMPLETE CBC W/AUTO DIFF WBC: CPT

## 2022-01-01 PROCEDURE — 86850 RBC ANTIBODY SCREEN: CPT

## 2022-01-01 PROCEDURE — 87077 CULTURE AEROBIC IDENTIFY: CPT

## 2022-01-01 PROCEDURE — 2060000000 HC ICU INTERMEDIATE R&B

## 2022-01-01 PROCEDURE — 97166 OT EVAL MOD COMPLEX 45 MIN: CPT

## 2022-01-01 PROCEDURE — 6370000000 HC RX 637 (ALT 250 FOR IP): Performed by: OBSTETRICS & GYNECOLOGY

## 2022-01-01 PROCEDURE — 0UBMXZX EXCISION OF VULVA, EXTERNAL APPROACH, DIAGNOSTIC: ICD-10-PCS | Performed by: INTERNAL MEDICINE

## 2022-01-01 PROCEDURE — 83605 ASSAY OF LACTIC ACID: CPT

## 2022-01-01 PROCEDURE — 85610 PROTHROMBIN TIME: CPT

## 2022-01-01 PROCEDURE — 99222 1ST HOSP IP/OBS MODERATE 55: CPT | Performed by: OBSTETRICS & GYNECOLOGY

## 2022-01-01 PROCEDURE — 6360000002 HC RX W HCPCS: Performed by: NURSE ANESTHETIST, CERTIFIED REGISTERED

## 2022-01-01 PROCEDURE — 1200000000 HC SEMI PRIVATE

## 2022-01-01 PROCEDURE — 99285 EMERGENCY DEPT VISIT HI MDM: CPT

## 2022-01-01 PROCEDURE — G8400 PT W/DXA NO RESULTS DOC: HCPCS | Performed by: NURSE PRACTITIONER

## 2022-01-01 PROCEDURE — 71045 X-RAY EXAM CHEST 1 VIEW: CPT

## 2022-01-01 PROCEDURE — 87086 URINE CULTURE/COLONY COUNT: CPT

## 2022-01-01 PROCEDURE — 83036 HEMOGLOBIN GLYCOSYLATED A1C: CPT | Performed by: NURSE PRACTITIONER

## 2022-01-01 PROCEDURE — 76536 US EXAM OF HEAD AND NECK: CPT

## 2022-01-01 PROCEDURE — 80076 HEPATIC FUNCTION PANEL: CPT

## 2022-01-01 PROCEDURE — 36415 COLL VENOUS BLD VENIPUNCTURE: CPT

## 2022-01-01 PROCEDURE — 6360000002 HC RX W HCPCS: Performed by: STUDENT IN AN ORGANIZED HEALTH CARE EDUCATION/TRAINING PROGRAM

## 2022-01-01 PROCEDURE — 96365 THER/PROPH/DIAG IV INF INIT: CPT

## 2022-01-01 PROCEDURE — 86923 COMPATIBILITY TEST ELECTRIC: CPT

## 2022-01-01 PROCEDURE — 84484 ASSAY OF TROPONIN QUANT: CPT

## 2022-01-01 PROCEDURE — 96375 TX/PRO/DX INJ NEW DRUG ADDON: CPT

## 2022-01-01 PROCEDURE — 84157 ASSAY OF PROTEIN OTHER: CPT

## 2022-01-01 PROCEDURE — 84132 ASSAY OF SERUM POTASSIUM: CPT

## 2022-01-01 PROCEDURE — 96366 THER/PROPH/DIAG IV INF ADDON: CPT

## 2022-01-01 PROCEDURE — 87040 BLOOD CULTURE FOR BACTERIA: CPT

## 2022-01-01 PROCEDURE — 80048 BASIC METABOLIC PNL TOTAL CA: CPT

## 2022-01-01 PROCEDURE — 6360000002 HC RX W HCPCS: Performed by: HOSPITALIST

## 2022-01-01 PROCEDURE — 36430 TRANSFUSION BLD/BLD COMPNT: CPT

## 2022-01-01 PROCEDURE — 88112 CYTOPATH CELL ENHANCE TECH: CPT

## 2022-01-01 PROCEDURE — 80202 ASSAY OF VANCOMYCIN: CPT

## 2022-01-01 PROCEDURE — 6360000002 HC RX W HCPCS: Performed by: EMERGENCY MEDICINE

## 2022-01-01 PROCEDURE — 3700000000 HC ANESTHESIA ATTENDED CARE: Performed by: SURGERY

## 2022-01-01 PROCEDURE — 83970 ASSAY OF PARATHORMONE: CPT

## 2022-01-01 PROCEDURE — 6360000002 HC RX W HCPCS: Performed by: PHYSICIAN ASSISTANT

## 2022-01-01 PROCEDURE — 99223 1ST HOSP IP/OBS HIGH 75: CPT | Performed by: INTERNAL MEDICINE

## 2022-01-01 PROCEDURE — 81001 URINALYSIS AUTO W/SCOPE: CPT

## 2022-01-01 PROCEDURE — 77001 FLUOROGUIDE FOR VEIN DEVICE: CPT | Performed by: SURGERY

## 2022-01-01 PROCEDURE — 87186 SC STD MICRODIL/AGAR DIL: CPT

## 2022-01-01 PROCEDURE — 97530 THERAPEUTIC ACTIVITIES: CPT | Performed by: PHYSICAL THERAPIST

## 2022-01-01 PROCEDURE — 80069 RENAL FUNCTION PANEL: CPT

## 2022-01-01 PROCEDURE — 96367 TX/PROPH/DG ADDL SEQ IV INF: CPT

## 2022-01-01 PROCEDURE — 93010 ELECTROCARDIOGRAM REPORT: CPT | Performed by: INTERNAL MEDICINE

## 2022-01-01 PROCEDURE — 7100000010 HC PHASE II RECOVERY - FIRST 15 MIN: Performed by: SURGERY

## 2022-01-01 PROCEDURE — 2500000003 HC RX 250 WO HCPCS: Performed by: STUDENT IN AN ORGANIZED HEALTH CARE EDUCATION/TRAINING PROGRAM

## 2022-01-01 PROCEDURE — 84443 ASSAY THYROID STIM HORMONE: CPT

## 2022-01-01 PROCEDURE — 7100000011 HC PHASE II RECOVERY - ADDTL 15 MIN: Performed by: SURGERY

## 2022-01-01 PROCEDURE — 82330 ASSAY OF CALCIUM: CPT

## 2022-01-01 PROCEDURE — 1090F PRES/ABSN URINE INCON ASSESS: CPT | Performed by: NURSE PRACTITIONER

## 2022-01-01 PROCEDURE — 6360000004 HC RX CONTRAST MEDICATION: Performed by: EMERGENCY MEDICINE

## 2022-01-01 PROCEDURE — 9990000010 HC NO CHARGE VISIT

## 2022-01-01 PROCEDURE — 93005 ELECTROCARDIOGRAM TRACING: CPT | Performed by: EMERGENCY MEDICINE

## 2022-01-01 PROCEDURE — 83540 ASSAY OF IRON: CPT

## 2022-01-01 PROCEDURE — 1111F DSCHRG MED/CURRENT MED MERGE: CPT | Performed by: NURSE PRACTITIONER

## 2022-01-01 PROCEDURE — 3600000003 HC SURGERY LEVEL 3 BASE: Performed by: SURGERY

## 2022-01-01 PROCEDURE — 2709999900 HC NON-CHARGEABLE SUPPLY: Performed by: SURGERY

## 2022-01-01 PROCEDURE — 6360000002 HC RX W HCPCS: Performed by: SURGERY

## 2022-01-01 PROCEDURE — 2580000003 HC RX 258: Performed by: HOSPITALIST

## 2022-01-01 PROCEDURE — 3700000001 HC ADD 15 MINUTES (ANESTHESIA): Performed by: SURGERY

## 2022-01-01 PROCEDURE — 96376 TX/PRO/DX INJ SAME DRUG ADON: CPT

## 2022-01-01 PROCEDURE — 72125 CT NECK SPINE W/O DYE: CPT

## 2022-01-01 PROCEDURE — 7100000000 HC PACU RECOVERY - FIRST 15 MIN: Performed by: SURGERY

## 2022-01-01 PROCEDURE — 83036 HEMOGLOBIN GLYCOSYLATED A1C: CPT

## 2022-01-01 PROCEDURE — 77001 FLUOROGUIDE FOR VEIN DEVICE: CPT

## 2022-01-01 PROCEDURE — 86900 BLOOD TYPING SEROLOGIC ABO: CPT

## 2022-01-01 PROCEDURE — G0378 HOSPITAL OBSERVATION PER HR: HCPCS

## 2022-01-01 PROCEDURE — 2580000003 HC RX 258: Performed by: PHYSICIAN ASSISTANT

## 2022-01-01 PROCEDURE — 96374 THER/PROPH/DIAG INJ IV PUSH: CPT

## 2022-01-01 PROCEDURE — 82945 GLUCOSE OTHER FLUID: CPT

## 2022-01-01 PROCEDURE — 3017F COLORECTAL CA SCREEN DOC REV: CPT | Performed by: NURSE PRACTITIONER

## 2022-01-01 PROCEDURE — 94760 N-INVAS EAR/PLS OXIMETRY 1: CPT

## 2022-01-01 PROCEDURE — 2500000003 HC RX 250 WO HCPCS: Performed by: SURGERY

## 2022-01-01 PROCEDURE — 3600000013 HC SURGERY LEVEL 3 ADDTL 15MIN: Performed by: SURGERY

## 2022-01-01 PROCEDURE — 87070 CULTURE OTHR SPECIMN AEROBIC: CPT

## 2022-01-01 PROCEDURE — 88305 TISSUE EXAM BY PATHOLOGIST: CPT

## 2022-01-01 PROCEDURE — 82542 COL CHROMOTOGRAPHY QUAL/QUAN: CPT

## 2022-01-01 PROCEDURE — 84100 ASSAY OF PHOSPHORUS: CPT

## 2022-01-01 PROCEDURE — 99213 OFFICE O/P EST LOW 20 MIN: CPT | Performed by: NURSE PRACTITIONER

## 2022-01-01 PROCEDURE — 2580000003 HC RX 258: Performed by: SURGERY

## 2022-01-01 PROCEDURE — P9016 RBC LEUKOCYTES REDUCED: HCPCS

## 2022-01-01 PROCEDURE — 97116 GAIT TRAINING THERAPY: CPT

## 2022-01-01 PROCEDURE — 88341 IMHCHEM/IMCYTCHM EA ADD ANTB: CPT

## 2022-01-01 PROCEDURE — 3044F HG A1C LEVEL LT 7.0%: CPT | Performed by: NURSE PRACTITIONER

## 2022-01-01 PROCEDURE — 82728 ASSAY OF FERRITIN: CPT

## 2022-01-01 PROCEDURE — 1123F ACP DISCUSS/DSCN MKR DOCD: CPT | Performed by: NURSE PRACTITIONER

## 2022-01-01 PROCEDURE — 85027 COMPLETE CBC AUTOMATED: CPT

## 2022-01-01 PROCEDURE — 70450 CT HEAD/BRAIN W/O DYE: CPT

## 2022-01-01 PROCEDURE — 88342 IMHCHEM/IMCYTCHM 1ST ANTB: CPT

## 2022-01-01 PROCEDURE — U0005 INFEC AGEN DETEC AMPLI PROBE: HCPCS

## 2022-01-01 PROCEDURE — 97162 PT EVAL MOD COMPLEX 30 MIN: CPT | Performed by: PHYSICAL THERAPIST

## 2022-01-01 PROCEDURE — 87205 SMEAR GRAM STAIN: CPT

## 2022-01-01 PROCEDURE — 2022F DILAT RTA XM EVC RTNOPTHY: CPT | Performed by: NURSE PRACTITIONER

## 2022-01-01 PROCEDURE — 32555 ASPIRATE PLEURA W/ IMAGING: CPT

## 2022-01-01 PROCEDURE — C1788 PORT, INDWELLING, IMP: HCPCS | Performed by: SURGERY

## 2022-01-01 PROCEDURE — 93005 ELECTROCARDIOGRAM TRACING: CPT | Performed by: PHYSICIAN ASSISTANT

## 2022-01-01 PROCEDURE — 4040F PNEUMOC VAC/ADMIN/RCVD: CPT | Performed by: NURSE PRACTITIONER

## 2022-01-01 PROCEDURE — G8427 DOCREV CUR MEDS BY ELIG CLIN: HCPCS | Performed by: NURSE PRACTITIONER

## 2022-01-01 PROCEDURE — 2500000003 HC RX 250 WO HCPCS: Performed by: CLINICAL NURSE SPECIALIST

## 2022-01-01 PROCEDURE — 83615 LACTATE (LD) (LDH) ENZYME: CPT

## 2022-01-01 PROCEDURE — 83880 ASSAY OF NATRIURETIC PEPTIDE: CPT

## 2022-01-01 PROCEDURE — U0003 INFECTIOUS AGENT DETECTION BY NUCLEIC ACID (DNA OR RNA); SEVERE ACUTE RESPIRATORY SYNDROME CORONAVIRUS 2 (SARS-COV-2) (CORONAVIRUS DISEASE [COVID-19]), AMPLIFIED PROBE TECHNIQUE, MAKING USE OF HIGH THROUGHPUT TECHNOLOGIES AS DESCRIBED BY CMS-2020-01-R: HCPCS

## 2022-01-01 PROCEDURE — 97162 PT EVAL MOD COMPLEX 30 MIN: CPT

## 2022-01-01 PROCEDURE — 85014 HEMATOCRIT: CPT

## 2022-01-01 PROCEDURE — 32555 ASPIRATE PLEURA W/ IMAGING: CPT | Performed by: INTERNAL MEDICINE

## 2022-01-01 PROCEDURE — 71260 CT THORAX DX C+: CPT

## 2022-01-01 PROCEDURE — 86901 BLOOD TYPING SEROLOGIC RH(D): CPT

## 2022-01-01 PROCEDURE — 7100000001 HC PACU RECOVERY - ADDTL 15 MIN: Performed by: SURGERY

## 2022-01-01 PROCEDURE — 97116 GAIT TRAINING THERAPY: CPT | Performed by: PHYSICAL THERAPIST

## 2022-01-01 PROCEDURE — 1036F TOBACCO NON-USER: CPT | Performed by: NURSE PRACTITIONER

## 2022-01-01 PROCEDURE — 6370000000 HC RX 637 (ALT 250 FOR IP): Performed by: CLINICAL NURSE SPECIALIST

## 2022-01-01 PROCEDURE — G8420 CALC BMI NORM PARAMETERS: HCPCS | Performed by: NURSE PRACTITIONER

## 2022-01-01 PROCEDURE — 6370000000 HC RX 637 (ALT 250 FOR IP): Performed by: EMERGENCY MEDICINE

## 2022-01-01 PROCEDURE — 74177 CT ABD & PELVIS W/CONTRAST: CPT

## 2022-01-01 PROCEDURE — 97535 SELF CARE MNGMENT TRAINING: CPT

## 2022-01-01 PROCEDURE — 85018 HEMOGLOBIN: CPT

## 2022-01-01 PROCEDURE — 96372 THER/PROPH/DIAG INJ SC/IM: CPT

## 2022-01-01 DEVICE — PORT INFUS SGL LUMN ATTCH POLYUR OPN END CATH 8FR POWERPRT: Type: IMPLANTABLE DEVICE | Site: CHEST | Status: FUNCTIONAL

## 2022-01-01 RX ORDER — ONDANSETRON 4 MG/1
4 TABLET, ORALLY DISINTEGRATING ORAL EVERY 8 HOURS PRN
Status: DISCONTINUED | OUTPATIENT
Start: 2022-01-01 | End: 2022-01-01 | Stop reason: HOSPADM

## 2022-01-01 RX ORDER — INSULIN GLARGINE 100 [IU]/ML
22 INJECTION, SOLUTION SUBCUTANEOUS 2 TIMES DAILY
Status: DISCONTINUED | OUTPATIENT
Start: 2022-01-01 | End: 2022-01-01

## 2022-01-01 RX ORDER — ACETAMINOPHEN 325 MG/1
650 TABLET ORAL ONCE
Status: COMPLETED | OUTPATIENT
Start: 2022-01-01 | End: 2022-01-01

## 2022-01-01 RX ORDER — METRONIDAZOLE 500 MG/100ML
500 INJECTION, SOLUTION INTRAVENOUS EVERY 8 HOURS
Status: DISCONTINUED | OUTPATIENT
Start: 2022-01-01 | End: 2022-01-01

## 2022-01-01 RX ORDER — MAGNESIUM SULFATE IN WATER 40 MG/ML
2000 INJECTION, SOLUTION INTRAVENOUS PRN
Status: DISCONTINUED | OUTPATIENT
Start: 2022-01-01 | End: 2022-01-01 | Stop reason: HOSPADM

## 2022-01-01 RX ORDER — CALCIUM GLUCONATE 20 MG/ML
2000 INJECTION, SOLUTION INTRAVENOUS ONCE
Status: COMPLETED | OUTPATIENT
Start: 2022-01-01 | End: 2022-01-01

## 2022-01-01 RX ORDER — SODIUM CHLORIDE 9 MG/ML
INJECTION, SOLUTION INTRAVENOUS PRN
Status: DISCONTINUED | OUTPATIENT
Start: 2022-01-01 | End: 2022-01-01 | Stop reason: HOSPADM

## 2022-01-01 RX ORDER — 0.9 % SODIUM CHLORIDE 0.9 %
1000 INTRAVENOUS SOLUTION INTRAVENOUS ONCE
Status: COMPLETED | OUTPATIENT
Start: 2022-01-01 | End: 2022-01-01

## 2022-01-01 RX ORDER — ACETAMINOPHEN 325 MG/1
650 TABLET ORAL EVERY 6 HOURS PRN
Status: DISCONTINUED | OUTPATIENT
Start: 2022-01-01 | End: 2022-01-01 | Stop reason: HOSPADM

## 2022-01-01 RX ORDER — MORPHINE SULFATE 4 MG/ML
4 INJECTION, SOLUTION INTRAMUSCULAR; INTRAVENOUS
Status: DISCONTINUED | OUTPATIENT
Start: 2022-01-01 | End: 2022-01-01 | Stop reason: HOSPADM

## 2022-01-01 RX ORDER — FERROUS SULFATE TAB EC 324 MG (65 MG FE EQUIVALENT) 324 (65 FE) MG
324 TABLET DELAYED RESPONSE ORAL EVERY OTHER DAY
Status: DISCONTINUED | OUTPATIENT
Start: 2022-01-01 | End: 2022-01-01 | Stop reason: HOSPADM

## 2022-01-01 RX ORDER — HEPARIN SODIUM (PORCINE) LOCK FLUSH IV SOLN 100 UNIT/ML 100 UNIT/ML
SOLUTION INTRAVENOUS
Status: COMPLETED | OUTPATIENT
Start: 2022-01-01 | End: 2022-01-01

## 2022-01-01 RX ORDER — ONDANSETRON 2 MG/ML
4 INJECTION INTRAMUSCULAR; INTRAVENOUS EVERY 6 HOURS PRN
Status: DISCONTINUED | OUTPATIENT
Start: 2022-01-01 | End: 2022-01-01 | Stop reason: HOSPADM

## 2022-01-01 RX ORDER — OXYCODONE HYDROCHLORIDE 5 MG/1
5 TABLET ORAL EVERY 6 HOURS PRN
Qty: 20 TABLET | Refills: 0 | Status: SHIPPED | OUTPATIENT
Start: 2022-01-01 | End: 2022-01-01

## 2022-01-01 RX ORDER — POLYETHYLENE GLYCOL 3350 17 G/17G
17 POWDER, FOR SOLUTION ORAL DAILY PRN
Status: DISCONTINUED | OUTPATIENT
Start: 2022-01-01 | End: 2022-01-01 | Stop reason: HOSPADM

## 2022-01-01 RX ORDER — CALCIUM GLUCONATE 20 MG/ML
1000 INJECTION, SOLUTION INTRAVENOUS ONCE
Status: COMPLETED | OUTPATIENT
Start: 2022-01-01 | End: 2022-01-01

## 2022-01-01 RX ORDER — ENOXAPARIN SODIUM 100 MG/ML
40 INJECTION SUBCUTANEOUS DAILY
Status: DISCONTINUED | OUTPATIENT
Start: 2022-01-01 | End: 2022-01-01

## 2022-01-01 RX ORDER — INSULIN GLARGINE 100 [IU]/ML
20 INJECTION, SOLUTION SUBCUTANEOUS NIGHTLY
Status: ON HOLD | COMMUNITY
End: 2022-01-01 | Stop reason: HOSPADM

## 2022-01-01 RX ORDER — DEXTROSE MONOHYDRATE 100 MG/ML
INJECTION, SOLUTION INTRAVENOUS CONTINUOUS
Status: DISCONTINUED | OUTPATIENT
Start: 2022-01-01 | End: 2022-01-01

## 2022-01-01 RX ORDER — GABAPENTIN 300 MG/1
300 CAPSULE ORAL EVERY EVENING
Status: DISCONTINUED | OUTPATIENT
Start: 2022-01-01 | End: 2022-01-01 | Stop reason: HOSPADM

## 2022-01-01 RX ORDER — DEXTROSE MONOHYDRATE 100 MG/ML
INJECTION, SOLUTION INTRAVENOUS CONTINUOUS PRN
Status: DISCONTINUED | OUTPATIENT
Start: 2022-01-01 | End: 2022-01-01 | Stop reason: HOSPADM

## 2022-01-01 RX ORDER — OXYCODONE HYDROCHLORIDE 5 MG/1
2.5 TABLET ORAL
Status: DISCONTINUED | OUTPATIENT
Start: 2022-01-01 | End: 2022-01-01 | Stop reason: HOSPADM

## 2022-01-01 RX ORDER — GABAPENTIN 300 MG/1
300 CAPSULE ORAL
Status: DISCONTINUED | OUTPATIENT
Start: 2022-01-01 | End: 2022-01-01 | Stop reason: HOSPADM

## 2022-01-01 RX ORDER — INSULIN ASPART 100 [IU]/ML
0-4 INJECTION, SOLUTION INTRAVENOUS; SUBCUTANEOUS
Status: ON HOLD | COMMUNITY
End: 2022-01-01 | Stop reason: HOSPADM

## 2022-01-01 RX ORDER — CHLORPHENIRAMINE MALEATE 4 MG/1
4 TABLET ORAL EVERY 6 HOURS PRN
COMMUNITY
End: 2022-01-01

## 2022-01-01 RX ORDER — MAGNESIUM SULFATE IN WATER 40 MG/ML
2000 INJECTION, SOLUTION INTRAVENOUS ONCE
Status: COMPLETED | OUTPATIENT
Start: 2022-01-01 | End: 2022-01-01

## 2022-01-01 RX ORDER — ACETAMINOPHEN 325 MG/1
650 TABLET ORAL EVERY 6 HOURS PRN
COMMUNITY
End: 2022-01-01

## 2022-01-01 RX ORDER — POTASSIUM CHLORIDE AND SODIUM CHLORIDE 900; 300 MG/100ML; MG/100ML
INJECTION, SOLUTION INTRAVENOUS CONTINUOUS
Status: DISCONTINUED | OUTPATIENT
Start: 2022-01-01 | End: 2022-01-01

## 2022-01-01 RX ORDER — ROSUVASTATIN CALCIUM 20 MG/1
20 TABLET, COATED ORAL NIGHTLY
Status: DISCONTINUED | OUTPATIENT
Start: 2022-01-01 | End: 2022-01-01

## 2022-01-01 RX ORDER — POTASSIUM CHLORIDE 7.45 MG/ML
20 INJECTION INTRAVENOUS ONCE
Status: DISCONTINUED | OUTPATIENT
Start: 2022-01-01 | End: 2022-01-01

## 2022-01-01 RX ORDER — INSULIN LISPRO 100 [IU]/ML
0-12 INJECTION, SOLUTION INTRAVENOUS; SUBCUTANEOUS
Status: DISCONTINUED | OUTPATIENT
Start: 2022-01-01 | End: 2022-01-01 | Stop reason: HOSPADM

## 2022-01-01 RX ORDER — INSULIN LISPRO 100 [IU]/ML
0-4 INJECTION, SOLUTION INTRAVENOUS; SUBCUTANEOUS NIGHTLY
Status: DISCONTINUED | OUTPATIENT
Start: 2022-01-01 | End: 2022-01-01 | Stop reason: HOSPADM

## 2022-01-01 RX ORDER — CIPROFLOXACIN 500 MG/1
500 TABLET, FILM COATED ORAL 2 TIMES DAILY
Qty: 6 TABLET | Refills: 0 | Status: SHIPPED
Start: 2022-01-01 | End: 2022-01-01 | Stop reason: HOSPADM

## 2022-01-01 RX ORDER — GABAPENTIN 300 MG/1
900 CAPSULE ORAL NIGHTLY
Status: DISCONTINUED | OUTPATIENT
Start: 2022-01-01 | End: 2022-01-01 | Stop reason: HOSPADM

## 2022-01-01 RX ORDER — SODIUM CHLORIDE 0.9 % (FLUSH) 0.9 %
5-40 SYRINGE (ML) INJECTION PRN
Status: DISCONTINUED | OUTPATIENT
Start: 2022-01-01 | End: 2022-01-01 | Stop reason: HOSPADM

## 2022-01-01 RX ORDER — SODIUM CHLORIDE 9 MG/ML
INJECTION, SOLUTION INTRAVENOUS PRN
Status: DISCONTINUED | OUTPATIENT
Start: 2022-01-01 | End: 2022-01-01

## 2022-01-01 RX ORDER — GABAPENTIN 300 MG/1
300 CAPSULE ORAL EVERY MORNING
Status: DISCONTINUED | OUTPATIENT
Start: 2022-01-01 | End: 2022-01-01 | Stop reason: HOSPADM

## 2022-01-01 RX ORDER — ACETAMINOPHEN 650 MG/1
650 SUPPOSITORY RECTAL EVERY 6 HOURS PRN
Status: DISCONTINUED | OUTPATIENT
Start: 2022-01-01 | End: 2022-01-01 | Stop reason: HOSPADM

## 2022-01-01 RX ORDER — POTASSIUM CHLORIDE 750 MG/1
40 TABLET, FILM COATED, EXTENDED RELEASE ORAL ONCE
Status: DISCONTINUED | OUTPATIENT
Start: 2022-01-01 | End: 2022-01-01

## 2022-01-01 RX ORDER — GRANULES FOR ORAL 3 G/1
3 POWDER ORAL ONCE
Qty: 1 EACH | Refills: 0 | Status: SHIPPED | OUTPATIENT
Start: 2022-01-01 | End: 2022-01-01

## 2022-01-01 RX ORDER — SODIUM CHLORIDE 0.9 % (FLUSH) 0.9 %
5-40 SYRINGE (ML) INJECTION EVERY 12 HOURS SCHEDULED
Status: DISCONTINUED | OUTPATIENT
Start: 2022-01-01 | End: 2022-01-01 | Stop reason: HOSPADM

## 2022-01-01 RX ORDER — FERROUS SULFATE 325(65) MG
325 TABLET ORAL EVERY OTHER DAY
COMMUNITY
End: 2022-01-01

## 2022-01-01 RX ORDER — SODIUM CHLORIDE 0.9 % (FLUSH) 0.9 %
10 SYRINGE (ML) INJECTION EVERY 12 HOURS SCHEDULED
Status: DISCONTINUED | OUTPATIENT
Start: 2022-01-01 | End: 2022-01-01 | Stop reason: HOSPADM

## 2022-01-01 RX ORDER — NALOXONE HYDROCHLORIDE 0.4 MG/ML
0.4 INJECTION, SOLUTION INTRAMUSCULAR; INTRAVENOUS; SUBCUTANEOUS PRN
Status: DISCONTINUED | OUTPATIENT
Start: 2022-01-01 | End: 2022-01-01 | Stop reason: HOSPADM

## 2022-01-01 RX ORDER — ONDANSETRON 2 MG/ML
4 INJECTION INTRAMUSCULAR; INTRAVENOUS
Status: DISCONTINUED | OUTPATIENT
Start: 2022-01-01 | End: 2022-01-01 | Stop reason: HOSPADM

## 2022-01-01 RX ORDER — POLYETHYLENE GLYCOL 3350 17 G/17G
17 POWDER, FOR SOLUTION ORAL DAILY PRN
Qty: 527 G | Refills: 0 | Status: SHIPPED | OUTPATIENT
Start: 2022-01-01 | End: 2022-01-01

## 2022-01-01 RX ORDER — GABAPENTIN 300 MG/1
300 CAPSULE ORAL 3 TIMES DAILY
Status: DISCONTINUED | OUTPATIENT
Start: 2022-01-01 | End: 2022-01-01 | Stop reason: HOSPADM

## 2022-01-01 RX ORDER — MAGNESIUM HYDROXIDE 1200 MG/15ML
LIQUID ORAL CONTINUOUS PRN
Status: COMPLETED | OUTPATIENT
Start: 2022-01-01 | End: 2022-01-01

## 2022-01-01 RX ORDER — INSULIN LISPRO 100 [IU]/ML
0-6 INJECTION, SOLUTION INTRAVENOUS; SUBCUTANEOUS NIGHTLY
Status: DISCONTINUED | OUTPATIENT
Start: 2022-01-01 | End: 2022-01-01 | Stop reason: HOSPADM

## 2022-01-01 RX ORDER — POTASSIUM CHLORIDE 7.45 MG/ML
10 INJECTION INTRAVENOUS
Status: COMPLETED | OUTPATIENT
Start: 2022-01-01 | End: 2022-01-01

## 2022-01-01 RX ORDER — ACETAMINOPHEN 650 MG/1
650 SUPPOSITORY RECTAL EVERY 4 HOURS PRN
Status: DISCONTINUED | OUTPATIENT
Start: 2022-01-01 | End: 2022-01-01 | Stop reason: HOSPADM

## 2022-01-01 RX ORDER — METRONIDAZOLE 500 MG/1
500 TABLET ORAL 3 TIMES DAILY
Qty: 30 TABLET | Refills: 0 | Status: SHIPPED | OUTPATIENT
Start: 2022-01-01 | End: 2022-01-01

## 2022-01-01 RX ORDER — METRONIDAZOLE 500 MG/100ML
500 INJECTION, SOLUTION INTRAVENOUS ONCE
Status: COMPLETED | OUTPATIENT
Start: 2022-01-01 | End: 2022-01-01

## 2022-01-01 RX ORDER — PROPOFOL 10 MG/ML
INJECTION, EMULSION INTRAVENOUS CONTINUOUS PRN
Status: DISCONTINUED | OUTPATIENT
Start: 2022-01-01 | End: 2022-01-01 | Stop reason: SDUPTHER

## 2022-01-01 RX ORDER — MAGNESIUM SULFATE IN WATER 40 MG/ML
4000 INJECTION, SOLUTION INTRAVENOUS ONCE
Status: COMPLETED | OUTPATIENT
Start: 2022-01-01 | End: 2022-01-01

## 2022-01-01 RX ORDER — ROSUVASTATIN CALCIUM 10 MG/1
5 TABLET, COATED ORAL NIGHTLY
Status: DISCONTINUED | OUTPATIENT
Start: 2022-01-01 | End: 2022-01-01 | Stop reason: HOSPADM

## 2022-01-01 RX ORDER — MAGNESIUM SULFATE 1 G/100ML
1000 INJECTION INTRAVENOUS PRN
Status: DISCONTINUED | OUTPATIENT
Start: 2022-01-01 | End: 2022-01-01 | Stop reason: HOSPADM

## 2022-01-01 RX ORDER — ROSUVASTATIN CALCIUM 5 MG/1
5 TABLET, COATED ORAL NIGHTLY
Status: DISCONTINUED | OUTPATIENT
Start: 2022-01-01 | End: 2022-01-01 | Stop reason: HOSPADM

## 2022-01-01 RX ORDER — FENTANYL CITRATE 50 UG/ML
25 INJECTION, SOLUTION INTRAMUSCULAR; INTRAVENOUS EVERY 5 MIN PRN
Status: DISCONTINUED | OUTPATIENT
Start: 2022-01-01 | End: 2022-01-01 | Stop reason: HOSPADM

## 2022-01-01 RX ORDER — PROPOFOL 10 MG/ML
INJECTION, EMULSION INTRAVENOUS PRN
Status: DISCONTINUED | OUTPATIENT
Start: 2022-01-01 | End: 2022-01-01 | Stop reason: SDUPTHER

## 2022-01-01 RX ORDER — CALCIUM GLUCONATE 94 MG/ML
1000 INJECTION, SOLUTION INTRAVENOUS ONCE
Status: DISCONTINUED | OUTPATIENT
Start: 2022-01-01 | End: 2022-01-01 | Stop reason: CLARIF

## 2022-01-01 RX ORDER — MIDODRINE HYDROCHLORIDE 10 MG/1
10 TABLET ORAL ONCE
Status: COMPLETED | OUTPATIENT
Start: 2022-01-01 | End: 2022-01-01

## 2022-01-01 RX ORDER — SODIUM CHLORIDE 0.9 % (FLUSH) 0.9 %
10 SYRINGE (ML) INJECTION PRN
Status: DISCONTINUED | OUTPATIENT
Start: 2022-01-01 | End: 2022-01-01 | Stop reason: HOSPADM

## 2022-01-01 RX ORDER — DIAPER,BRIEF,INFANT-TODD,DISP
EACH MISCELLANEOUS 4 TIMES DAILY
Status: DISCONTINUED | OUTPATIENT
Start: 2022-01-01 | End: 2022-01-01 | Stop reason: HOSPADM

## 2022-01-01 RX ORDER — DEXTROSE MONOHYDRATE 25 G/50ML
25 INJECTION, SOLUTION INTRAVENOUS ONCE
Status: COMPLETED | OUTPATIENT
Start: 2022-01-01 | End: 2022-01-01

## 2022-01-01 RX ORDER — FERROUS SULFATE TAB EC 324 MG (65 MG FE EQUIVALENT) 324 (65 FE) MG
324 TABLET DELAYED RESPONSE ORAL EVERY OTHER DAY
Qty: 30 TABLET | Refills: 0 | Status: SHIPPED | OUTPATIENT
Start: 2022-01-01 | End: 2022-01-01

## 2022-01-01 RX ORDER — POTASSIUM CHLORIDE 20 MEQ/1
40 TABLET, EXTENDED RELEASE ORAL PRN
Status: DISCONTINUED | OUTPATIENT
Start: 2022-01-01 | End: 2022-01-01 | Stop reason: HOSPADM

## 2022-01-01 RX ORDER — INSULIN LISPRO 100 [IU]/ML
0-8 INJECTION, SOLUTION INTRAVENOUS; SUBCUTANEOUS
Status: DISCONTINUED | OUTPATIENT
Start: 2022-01-01 | End: 2022-01-01 | Stop reason: HOSPADM

## 2022-01-01 RX ORDER — FENTANYL CITRATE 50 UG/ML
INJECTION, SOLUTION INTRAMUSCULAR; INTRAVENOUS PRN
Status: DISCONTINUED | OUTPATIENT
Start: 2022-01-01 | End: 2022-01-01 | Stop reason: SDUPTHER

## 2022-01-01 RX ORDER — LIDOCAINE HYDROCHLORIDE 10 MG/ML
20 INJECTION, SOLUTION EPIDURAL; INFILTRATION; INTRACAUDAL; PERINEURAL ONCE
Status: COMPLETED | OUTPATIENT
Start: 2022-01-01 | End: 2022-01-01

## 2022-01-01 RX ORDER — LANOLIN ALCOHOL/MO/W.PET/CERES
400 CREAM (GRAM) TOPICAL DAILY
Status: DISCONTINUED | OUTPATIENT
Start: 2022-01-01 | End: 2022-01-01 | Stop reason: HOSPADM

## 2022-01-01 RX ORDER — GABAPENTIN 300 MG/1
CAPSULE ORAL
Qty: 180 CAPSULE | Refills: 5 | Status: SHIPPED | OUTPATIENT
Start: 2022-01-01 | End: 2022-10-01

## 2022-01-01 RX ORDER — POTASSIUM CHLORIDE 20 MEQ/1
20 TABLET, EXTENDED RELEASE ORAL 2 TIMES DAILY WITH MEALS
Status: DISCONTINUED | OUTPATIENT
Start: 2022-01-01 | End: 2022-01-01 | Stop reason: HOSPADM

## 2022-01-01 RX ORDER — OXYCODONE HYDROCHLORIDE 5 MG/1
5 TABLET ORAL
Status: COMPLETED | OUTPATIENT
Start: 2022-01-01 | End: 2022-01-01

## 2022-01-01 RX ORDER — LEVOFLOXACIN 500 MG/1
500 TABLET, FILM COATED ORAL DAILY
Qty: 7 TABLET | Refills: 0 | Status: SHIPPED | OUTPATIENT
Start: 2022-01-01 | End: 2022-01-01

## 2022-01-01 RX ORDER — ACETAMINOPHEN 325 MG/1
650 TABLET ORAL EVERY 4 HOURS PRN
Status: DISCONTINUED | OUTPATIENT
Start: 2022-01-01 | End: 2022-01-01 | Stop reason: HOSPADM

## 2022-01-01 RX ORDER — POTASSIUM CHLORIDE 20 MEQ/1
40 TABLET, EXTENDED RELEASE ORAL ONCE
Status: COMPLETED | OUTPATIENT
Start: 2022-01-01 | End: 2022-01-01

## 2022-01-01 RX ORDER — TRAMADOL HYDROCHLORIDE 50 MG/1
25 TABLET ORAL EVERY 6 HOURS PRN
Status: DISCONTINUED | OUTPATIENT
Start: 2022-01-01 | End: 2022-01-01 | Stop reason: HOSPADM

## 2022-01-01 RX ORDER — ROSUVASTATIN CALCIUM 10 MG/1
5 TABLET, COATED ORAL NIGHTLY
Status: DISCONTINUED | OUTPATIENT
Start: 2022-01-01 | End: 2022-01-01

## 2022-01-01 RX ORDER — POTASSIUM CHLORIDE 29.8 MG/ML
20 INJECTION INTRAVENOUS PRN
Status: DISCONTINUED | OUTPATIENT
Start: 2022-01-01 | End: 2022-01-01 | Stop reason: HOSPADM

## 2022-01-01 RX ORDER — DEXTROSE MONOHYDRATE 50 MG/ML
100 INJECTION, SOLUTION INTRAVENOUS PRN
Status: DISCONTINUED | OUTPATIENT
Start: 2022-01-01 | End: 2022-01-01 | Stop reason: HOSPADM

## 2022-01-01 RX ORDER — INSULIN GLARGINE 100 [IU]/ML
10 INJECTION, SOLUTION SUBCUTANEOUS NIGHTLY
Status: DISCONTINUED | OUTPATIENT
Start: 2022-01-01 | End: 2022-01-01

## 2022-01-01 RX ORDER — INSULIN GLARGINE 100 [IU]/ML
18 INJECTION, SOLUTION SUBCUTANEOUS NIGHTLY
Status: DISCONTINUED | OUTPATIENT
Start: 2022-01-01 | End: 2022-01-01 | Stop reason: HOSPADM

## 2022-01-01 RX ORDER — ONDANSETRON 2 MG/ML
4 INJECTION INTRAMUSCULAR; INTRAVENOUS EVERY 4 HOURS PRN
Status: DISCONTINUED | OUTPATIENT
Start: 2022-01-01 | End: 2022-01-01 | Stop reason: HOSPADM

## 2022-01-01 RX ORDER — ENOXAPARIN SODIUM 100 MG/ML
40 INJECTION SUBCUTANEOUS DAILY
Status: DISCONTINUED | OUTPATIENT
Start: 2022-01-01 | End: 2022-01-01 | Stop reason: HOSPADM

## 2022-01-01 RX ORDER — INSULIN LISPRO 100 [IU]/ML
0-8 INJECTION, SOLUTION INTRAVENOUS; SUBCUTANEOUS
Status: DISCONTINUED | OUTPATIENT
Start: 2022-01-01 | End: 2022-01-01

## 2022-01-01 RX ORDER — SODIUM CHLORIDE 9 MG/ML
INJECTION, SOLUTION INTRAVENOUS CONTINUOUS
Status: DISCONTINUED | OUTPATIENT
Start: 2022-01-01 | End: 2022-01-01 | Stop reason: HOSPADM

## 2022-01-01 RX ORDER — METRONIDAZOLE 250 MG/1
500 TABLET ORAL EVERY 8 HOURS SCHEDULED
Status: DISCONTINUED | OUTPATIENT
Start: 2022-01-01 | End: 2022-01-01

## 2022-01-01 RX ORDER — MORPHINE SULFATE 2 MG/ML
2 INJECTION, SOLUTION INTRAMUSCULAR; INTRAVENOUS EVERY 4 HOURS PRN
Status: DISCONTINUED | OUTPATIENT
Start: 2022-01-01 | End: 2022-01-01 | Stop reason: HOSPADM

## 2022-01-01 RX ORDER — SODIUM CHLORIDE 9 MG/ML
INJECTION, SOLUTION INTRAVENOUS CONTINUOUS
Status: DISCONTINUED | OUTPATIENT
Start: 2022-01-01 | End: 2022-01-01

## 2022-01-01 RX ORDER — INSULIN LISPRO 100 [IU]/ML
0-4 INJECTION, SOLUTION INTRAVENOUS; SUBCUTANEOUS NIGHTLY
Status: DISCONTINUED | OUTPATIENT
Start: 2022-01-01 | End: 2022-01-01

## 2022-01-01 RX ORDER — DIAPER,BRIEF,INFANT-TODD,DISP
EACH MISCELLANEOUS
Qty: 30 G | Refills: 1 | Status: SHIPPED | OUTPATIENT
Start: 2022-01-01 | End: 2022-08-16

## 2022-01-01 RX ORDER — POTASSIUM CHLORIDE 7.45 MG/ML
10 INJECTION INTRAVENOUS PRN
Status: DISCONTINUED | OUTPATIENT
Start: 2022-01-01 | End: 2022-01-01 | Stop reason: HOSPADM

## 2022-01-01 RX ORDER — 0.9 % SODIUM CHLORIDE 0.9 %
500 INTRAVENOUS SOLUTION INTRAVENOUS ONCE
Status: COMPLETED | OUTPATIENT
Start: 2022-01-01 | End: 2022-01-01

## 2022-01-01 RX ORDER — POTASSIUM CHLORIDE 750 MG/1
40 TABLET, FILM COATED, EXTENDED RELEASE ORAL ONCE
Status: COMPLETED | OUTPATIENT
Start: 2022-01-01 | End: 2022-01-01

## 2022-01-01 RX ORDER — ROSUVASTATIN CALCIUM 5 MG/1
TABLET, COATED ORAL
Qty: 90 TABLET | Refills: 1 | Status: ON HOLD
Start: 2022-01-01 | End: 2022-01-01 | Stop reason: HOSPADM

## 2022-01-01 RX ORDER — MIDAZOLAM HYDROCHLORIDE 1 MG/ML
INJECTION INTRAMUSCULAR; INTRAVENOUS PRN
Status: DISCONTINUED | OUTPATIENT
Start: 2022-01-01 | End: 2022-01-01 | Stop reason: SDUPTHER

## 2022-01-01 RX ADMIN — SODIUM CHLORIDE, PRESERVATIVE FREE 10 ML: 5 INJECTION INTRAVENOUS at 01:32

## 2022-01-01 RX ADMIN — OXYCODONE 2.5 MG: 5 TABLET ORAL at 08:19

## 2022-01-01 RX ADMIN — INSULIN GLARGINE 22 UNITS: 100 INJECTION, SOLUTION SUBCUTANEOUS at 23:14

## 2022-01-01 RX ADMIN — GABAPENTIN 300 MG: 300 CAPSULE ORAL at 18:34

## 2022-01-01 RX ADMIN — INSULIN LISPRO 2 UNITS: 100 INJECTION, SOLUTION INTRAVENOUS; SUBCUTANEOUS at 18:26

## 2022-01-01 RX ADMIN — ACETAMINOPHEN 650 MG: 325 TABLET ORAL at 23:10

## 2022-01-01 RX ADMIN — POTASSIUM CHLORIDE 20 MEQ: 29.8 INJECTION, SOLUTION INTRAVENOUS at 08:48

## 2022-01-01 RX ADMIN — SODIUM CHLORIDE: 9 INJECTION, SOLUTION INTRAVENOUS at 23:13

## 2022-01-01 RX ADMIN — MAGNESIUM SULFATE HEPTAHYDRATE 1000 MG: 1 INJECTION, SOLUTION INTRAVENOUS at 06:46

## 2022-01-01 RX ADMIN — SODIUM CHLORIDE, PRESERVATIVE FREE 10 ML: 5 INJECTION INTRAVENOUS at 09:54

## 2022-01-01 RX ADMIN — MAGNESIUM SULFATE HEPTAHYDRATE 1000 MG: 1 INJECTION, SOLUTION INTRAVENOUS at 18:50

## 2022-01-01 RX ADMIN — FERROUS SULFATE TAB EC 324 MG (65 MG FE EQUIVALENT) 324 MG: 324 (65 FE) TABLET DELAYED RESPONSE at 08:50

## 2022-01-01 RX ADMIN — MORPHINE SULFATE 2 MG: 2 INJECTION, SOLUTION INTRAMUSCULAR; INTRAVENOUS at 20:32

## 2022-01-01 RX ADMIN — GABAPENTIN 300 MG: 300 CAPSULE ORAL at 12:30

## 2022-01-01 RX ADMIN — Medication 10 ML: at 02:14

## 2022-01-01 RX ADMIN — OXYCODONE 2.5 MG: 5 TABLET ORAL at 16:56

## 2022-01-01 RX ADMIN — MORPHINE SULFATE 4 MG: 4 INJECTION, SOLUTION INTRAMUSCULAR; INTRAVENOUS at 20:18

## 2022-01-01 RX ADMIN — METRONIDAZOLE 500 MG: 500 INJECTION, SOLUTION INTRAVENOUS at 02:29

## 2022-01-01 RX ADMIN — SODIUM CHLORIDE: 9 INJECTION, SOLUTION INTRAVENOUS at 02:42

## 2022-01-01 RX ADMIN — FERROUS SULFATE TAB EC 324 MG (65 MG FE EQUIVALENT) 324 MG: 324 (65 FE) TABLET DELAYED RESPONSE at 09:10

## 2022-01-01 RX ADMIN — DEXTROSE MONOHYDRATE 1000 MG: 5 INJECTION INTRAVENOUS at 15:45

## 2022-01-01 RX ADMIN — ROSUVASTATIN 5 MG: 10 TABLET, FILM COATED ORAL at 21:16

## 2022-01-01 RX ADMIN — SODIUM CHLORIDE 50 ML: 9 INJECTION, SOLUTION INTRAVENOUS at 06:39

## 2022-01-01 RX ADMIN — GABAPENTIN 300 MG: 300 CAPSULE ORAL at 15:51

## 2022-01-01 RX ADMIN — ACETAMINOPHEN 650 MG: 325 TABLET ORAL at 20:58

## 2022-01-01 RX ADMIN — ROSUVASTATIN CALCIUM 5 MG: 5 TABLET, FILM COATED ORAL at 20:58

## 2022-01-01 RX ADMIN — POTASSIUM CHLORIDE 40 MEQ: 1500 TABLET, EXTENDED RELEASE ORAL at 09:05

## 2022-01-01 RX ADMIN — CEFAZOLIN 2000 MG: 10 INJECTION, POWDER, FOR SOLUTION INTRAVENOUS at 13:22

## 2022-01-01 RX ADMIN — SODIUM CHLORIDE, PRESERVATIVE FREE 10 ML: 5 INJECTION INTRAVENOUS at 09:32

## 2022-01-01 RX ADMIN — VASOPRESSIN: 20 INJECTION, SOLUTION INTRAVENOUS at 06:33

## 2022-01-01 RX ADMIN — OXYCODONE 2.5 MG: 5 TABLET ORAL at 02:30

## 2022-01-01 RX ADMIN — SODIUM CHLORIDE 500 ML: 9 INJECTION, SOLUTION INTRAVENOUS at 10:22

## 2022-01-01 RX ADMIN — OXYCODONE 2.5 MG: 5 TABLET ORAL at 20:44

## 2022-01-01 RX ADMIN — GABAPENTIN 300 MG: 300 CAPSULE ORAL at 15:58

## 2022-01-01 RX ADMIN — OXYCODONE 2.5 MG: 5 TABLET ORAL at 09:10

## 2022-01-01 RX ADMIN — SODIUM CHLORIDE 1000 ML: 9 INJECTION, SOLUTION INTRAVENOUS at 20:37

## 2022-01-01 RX ADMIN — GABAPENTIN 300 MG: 300 CAPSULE ORAL at 07:50

## 2022-01-01 RX ADMIN — MORPHINE SULFATE 2 MG: 2 INJECTION, SOLUTION INTRAMUSCULAR; INTRAVENOUS at 23:44

## 2022-01-01 RX ADMIN — MEROPENEM 500 MG: 500 INJECTION, POWDER, FOR SOLUTION INTRAVENOUS at 22:13

## 2022-01-01 RX ADMIN — MORPHINE SULFATE 4 MG: 4 INJECTION, SOLUTION INTRAMUSCULAR; INTRAVENOUS at 15:52

## 2022-01-01 RX ADMIN — MAGNESIUM SULFATE HEPTAHYDRATE 1000 MG: 1 INJECTION, SOLUTION INTRAVENOUS at 21:53

## 2022-01-01 RX ADMIN — MORPHINE SULFATE 2 MG: 2 INJECTION, SOLUTION INTRAMUSCULAR; INTRAVENOUS at 18:43

## 2022-01-01 RX ADMIN — METRONIDAZOLE 500 MG: 250 TABLET ORAL at 21:16

## 2022-01-01 RX ADMIN — INSULIN LISPRO 2 UNITS: 100 INJECTION, SOLUTION INTRAVENOUS; SUBCUTANEOUS at 12:27

## 2022-01-01 RX ADMIN — METRONIDAZOLE 500 MG: 500 INJECTION, SOLUTION INTRAVENOUS at 00:46

## 2022-01-01 RX ADMIN — ROSUVASTATIN CALCIUM 5 MG: 10 TABLET, FILM COATED ORAL at 22:03

## 2022-01-01 RX ADMIN — HYDROCORTISONE: 0.01 CREAM TOPICAL at 14:42

## 2022-01-01 RX ADMIN — INSULIN LISPRO 2 UNITS: 100 INJECTION, SOLUTION INTRAVENOUS; SUBCUTANEOUS at 13:02

## 2022-01-01 RX ADMIN — MORPHINE SULFATE 2 MG: 2 INJECTION, SOLUTION INTRAMUSCULAR; INTRAVENOUS at 17:25

## 2022-01-01 RX ADMIN — POTASSIUM CHLORIDE 20 MEQ: 29.8 INJECTION, SOLUTION INTRAVENOUS at 18:47

## 2022-01-01 RX ADMIN — SODIUM CHLORIDE: 9 INJECTION, SOLUTION INTRAVENOUS at 12:22

## 2022-01-01 RX ADMIN — PROPOFOL 50 MG: 10 INJECTION, EMULSION INTRAVENOUS at 13:25

## 2022-01-01 RX ADMIN — SODIUM CHLORIDE, PRESERVATIVE FREE 10 ML: 5 INJECTION INTRAVENOUS at 12:30

## 2022-01-01 RX ADMIN — CALCIUM GLUCONATE 2000 MG: 20 INJECTION, SOLUTION INTRAVENOUS at 14:18

## 2022-01-01 RX ADMIN — MAGNESIUM SULFATE HEPTAHYDRATE 2000 MG: 40 INJECTION, SOLUTION INTRAVENOUS at 14:49

## 2022-01-01 RX ADMIN — POTASSIUM PHOSPHATE, MONOBASIC 500 MG: 500 TABLET, SOLUBLE ORAL at 13:15

## 2022-01-01 RX ADMIN — METRONIDAZOLE 500 MG: 500 INJECTION, SOLUTION INTRAVENOUS at 11:10

## 2022-01-01 RX ADMIN — DEXTROSE MONOHYDRATE: 100 INJECTION, SOLUTION INTRAVENOUS at 04:47

## 2022-01-01 RX ADMIN — MORPHINE SULFATE 4 MG: 4 INJECTION, SOLUTION INTRAMUSCULAR; INTRAVENOUS at 10:05

## 2022-01-01 RX ADMIN — MORPHINE SULFATE 2 MG: 2 INJECTION, SOLUTION INTRAMUSCULAR; INTRAVENOUS at 12:31

## 2022-01-01 RX ADMIN — MEROPENEM 500 MG: 500 INJECTION, POWDER, FOR SOLUTION INTRAVENOUS at 09:00

## 2022-01-01 RX ADMIN — GABAPENTIN 300 MG: 300 CAPSULE ORAL at 10:05

## 2022-01-01 RX ADMIN — ACETAMINOPHEN 650 MG: 325 TABLET ORAL at 10:59

## 2022-01-01 RX ADMIN — INSULIN LISPRO 2 UNITS: 100 INJECTION, SOLUTION INTRAVENOUS; SUBCUTANEOUS at 18:35

## 2022-01-01 RX ADMIN — GABAPENTIN 300 MG: 300 CAPSULE ORAL at 09:05

## 2022-01-01 RX ADMIN — ROSUVASTATIN CALCIUM 5 MG: 5 TABLET, FILM COATED ORAL at 22:00

## 2022-01-01 RX ADMIN — POTASSIUM CHLORIDE 20 MEQ: 29.8 INJECTION, SOLUTION INTRAVENOUS at 09:23

## 2022-01-01 RX ADMIN — SODIUM CHLORIDE, PRESERVATIVE FREE 10 ML: 5 INJECTION INTRAVENOUS at 20:58

## 2022-01-01 RX ADMIN — MORPHINE SULFATE 4 MG: 4 INJECTION, SOLUTION INTRAMUSCULAR; INTRAVENOUS at 14:43

## 2022-01-01 RX ADMIN — SODIUM CHLORIDE, PRESERVATIVE FREE 10 ML: 5 INJECTION INTRAVENOUS at 20:39

## 2022-01-01 RX ADMIN — SODIUM CHLORIDE: 9 INJECTION, SOLUTION INTRAVENOUS at 02:14

## 2022-01-01 RX ADMIN — MEROPENEM 500 MG: 500 INJECTION, POWDER, FOR SOLUTION INTRAVENOUS at 09:05

## 2022-01-01 RX ADMIN — SODIUM CHLORIDE, PRESERVATIVE FREE 10 ML: 5 INJECTION INTRAVENOUS at 09:14

## 2022-01-01 RX ADMIN — SODIUM CHLORIDE, PRESERVATIVE FREE 10 ML: 5 INJECTION INTRAVENOUS at 08:53

## 2022-01-01 RX ADMIN — CEFEPIME HYDROCHLORIDE 2000 MG: 2 INJECTION, POWDER, FOR SOLUTION INTRAVENOUS at 20:40

## 2022-01-01 RX ADMIN — INSULIN GLARGINE 22 UNITS: 100 INJECTION, SOLUTION SUBCUTANEOUS at 09:08

## 2022-01-01 RX ADMIN — INSULIN GLARGINE 18 UNITS: 100 INJECTION, SOLUTION SUBCUTANEOUS at 22:06

## 2022-01-01 RX ADMIN — FERROUS SULFATE TAB EC 324 MG (65 MG FE EQUIVALENT) 324 MG: 324 (65 FE) TABLET DELAYED RESPONSE at 18:54

## 2022-01-01 RX ADMIN — MEROPENEM 500 MG: 500 INJECTION, POWDER, FOR SOLUTION INTRAVENOUS at 03:56

## 2022-01-01 RX ADMIN — CEFEPIME HYDROCHLORIDE 2000 MG: 2 INJECTION, POWDER, FOR SOLUTION INTRAVENOUS at 09:08

## 2022-01-01 RX ADMIN — POTASSIUM CHLORIDE 40 MEQ: 1500 TABLET, EXTENDED RELEASE ORAL at 05:53

## 2022-01-01 RX ADMIN — VANCOMYCIN HYDROCHLORIDE 1000 MG: 1 INJECTION, POWDER, LYOPHILIZED, FOR SOLUTION INTRAVENOUS at 21:58

## 2022-01-01 RX ADMIN — MORPHINE SULFATE 2 MG: 2 INJECTION, SOLUTION INTRAMUSCULAR; INTRAVENOUS at 13:14

## 2022-01-01 RX ADMIN — OXYCODONE 2.5 MG: 5 TABLET ORAL at 12:27

## 2022-01-01 RX ADMIN — MORPHINE SULFATE 4 MG: 4 INJECTION, SOLUTION INTRAMUSCULAR; INTRAVENOUS at 02:13

## 2022-01-01 RX ADMIN — POTASSIUM PHOSPHATE, MONOBASIC 500 MG: 500 TABLET, SOLUBLE ORAL at 17:32

## 2022-01-01 RX ADMIN — OXYCODONE 2.5 MG: 5 TABLET ORAL at 11:51

## 2022-01-01 RX ADMIN — POTASSIUM CHLORIDE 20 MEQ: 29.8 INJECTION, SOLUTION INTRAVENOUS at 06:40

## 2022-01-01 RX ADMIN — GABAPENTIN 900 MG: 300 CAPSULE ORAL at 23:10

## 2022-01-01 RX ADMIN — GABAPENTIN 300 MG: 300 CAPSULE ORAL at 14:35

## 2022-01-01 RX ADMIN — MEROPENEM 500 MG: 500 INJECTION, POWDER, FOR SOLUTION INTRAVENOUS at 15:30

## 2022-01-01 RX ADMIN — MORPHINE SULFATE 2 MG: 2 INJECTION, SOLUTION INTRAMUSCULAR; INTRAVENOUS at 00:18

## 2022-01-01 RX ADMIN — CALCIUM GLUCONATE 1000 MG: 98 INJECTION, SOLUTION INTRAVENOUS at 20:36

## 2022-01-01 RX ADMIN — INSULIN LISPRO 2 UNITS: 100 INJECTION, SOLUTION INTRAVENOUS; SUBCUTANEOUS at 20:36

## 2022-01-01 RX ADMIN — POTASSIUM CHLORIDE 40 MEQ: 750 TABLET, FILM COATED, EXTENDED RELEASE ORAL at 05:00

## 2022-01-01 RX ADMIN — GABAPENTIN 300 MG: 300 CAPSULE ORAL at 09:53

## 2022-01-01 RX ADMIN — HYDROCORTISONE: 0.01 CREAM TOPICAL at 21:17

## 2022-01-01 RX ADMIN — MORPHINE SULFATE 2 MG: 2 INJECTION, SOLUTION INTRAMUSCULAR; INTRAVENOUS at 05:13

## 2022-01-01 RX ADMIN — POTASSIUM CHLORIDE 20 MEQ: 29.8 INJECTION, SOLUTION INTRAVENOUS at 10:42

## 2022-01-01 RX ADMIN — MORPHINE SULFATE 2 MG: 2 INJECTION, SOLUTION INTRAMUSCULAR; INTRAVENOUS at 08:56

## 2022-01-01 RX ADMIN — TRAMADOL HYDROCHLORIDE 25 MG: 50 TABLET, COATED ORAL at 10:20

## 2022-01-01 RX ADMIN — SODIUM CHLORIDE: 9 INJECTION, SOLUTION INTRAVENOUS at 00:28

## 2022-01-01 RX ADMIN — Medication 10 ML: at 11:53

## 2022-01-01 RX ADMIN — INSULIN GLARGINE 22 UNITS: 100 INJECTION, SOLUTION SUBCUTANEOUS at 20:48

## 2022-01-01 RX ADMIN — MEROPENEM 500 MG: 500 INJECTION, POWDER, FOR SOLUTION INTRAVENOUS at 20:27

## 2022-01-01 RX ADMIN — Medication 10 MEQ: at 09:02

## 2022-01-01 RX ADMIN — MAGNESIUM SULFATE HEPTAHYDRATE 1000 MG: 1 INJECTION, SOLUTION INTRAVENOUS at 08:44

## 2022-01-01 RX ADMIN — VASOPRESSIN: 20 INJECTION, SOLUTION INTRAVENOUS at 18:10

## 2022-01-01 RX ADMIN — MORPHINE SULFATE 4 MG: 4 INJECTION, SOLUTION INTRAMUSCULAR; INTRAVENOUS at 05:59

## 2022-01-01 RX ADMIN — ROSUVASTATIN CALCIUM 5 MG: 10 TABLET, FILM COATED ORAL at 22:05

## 2022-01-01 RX ADMIN — Medication 10 ML: at 21:18

## 2022-01-01 RX ADMIN — Medication 10 ML: at 10:06

## 2022-01-01 RX ADMIN — DEXTROSE MONOHYDRATE 125 ML: 100 INJECTION, SOLUTION INTRAVENOUS at 05:40

## 2022-01-01 RX ADMIN — ROSUVASTATIN CALCIUM 5 MG: 10 TABLET, FILM COATED ORAL at 20:44

## 2022-01-01 RX ADMIN — POTASSIUM BICARBONATE 40 MEQ: 782 TABLET, EFFERVESCENT ORAL at 05:39

## 2022-01-01 RX ADMIN — INSULIN LISPRO 4 UNITS: 100 INJECTION, SOLUTION INTRAVENOUS; SUBCUTANEOUS at 22:00

## 2022-01-01 RX ADMIN — Medication 400 MG: at 20:33

## 2022-01-01 RX ADMIN — MIDAZOLAM 1 MG: 1 INJECTION INTRAMUSCULAR; INTRAVENOUS at 13:10

## 2022-01-01 RX ADMIN — GABAPENTIN 300 MG: 300 CAPSULE ORAL at 08:44

## 2022-01-01 RX ADMIN — POTASSIUM CHLORIDE 20 MEQ: 29.8 INJECTION, SOLUTION INTRAVENOUS at 12:58

## 2022-01-01 RX ADMIN — GABAPENTIN 300 MG: 300 CAPSULE ORAL at 09:01

## 2022-01-01 RX ADMIN — ENOXAPARIN SODIUM 40 MG: 100 INJECTION SUBCUTANEOUS at 09:05

## 2022-01-01 RX ADMIN — GABAPENTIN 900 MG: 300 CAPSULE ORAL at 20:26

## 2022-01-01 RX ADMIN — LIDOCAINE HYDROCHLORIDE 20 ML: 10 INJECTION, SOLUTION EPIDURAL; INFILTRATION; INTRACAUDAL; PERINEURAL at 10:50

## 2022-01-01 RX ADMIN — SILVER SULFADIAZINE: 10 CREAM TOPICAL at 15:24

## 2022-01-01 RX ADMIN — INSULIN LISPRO 2 UNITS: 100 INJECTION, SOLUTION INTRAVENOUS; SUBCUTANEOUS at 13:31

## 2022-01-01 RX ADMIN — MIDODRINE HYDROCHLORIDE 10 MG: 10 TABLET ORAL at 23:10

## 2022-01-01 RX ADMIN — SODIUM CHLORIDE: 9 INJECTION, SOLUTION INTRAVENOUS at 19:06

## 2022-01-01 RX ADMIN — GABAPENTIN 300 MG: 300 CAPSULE ORAL at 18:01

## 2022-01-01 RX ADMIN — MORPHINE SULFATE 2 MG: 2 INJECTION, SOLUTION INTRAMUSCULAR; INTRAVENOUS at 13:01

## 2022-01-01 RX ADMIN — DEXTROSE MONOHYDRATE 25 G: 25 INJECTION, SOLUTION INTRAVENOUS at 05:17

## 2022-01-01 RX ADMIN — ROSUVASTATIN CALCIUM 5 MG: 10 TABLET, FILM COATED ORAL at 23:10

## 2022-01-01 RX ADMIN — Medication 400 MG: at 08:51

## 2022-01-01 RX ADMIN — POTASSIUM PHOSPHATE, MONOBASIC 500 MG: 500 TABLET, SOLUBLE ORAL at 20:57

## 2022-01-01 RX ADMIN — SODIUM CHLORIDE 1000 ML: 9 INJECTION, SOLUTION INTRAVENOUS at 04:59

## 2022-01-01 RX ADMIN — GABAPENTIN 300 MG: 300 CAPSULE ORAL at 21:16

## 2022-01-01 RX ADMIN — VANCOMYCIN HYDROCHLORIDE 1000 MG: 1 INJECTION, POWDER, LYOPHILIZED, FOR SOLUTION INTRAVENOUS at 18:16

## 2022-01-01 RX ADMIN — GABAPENTIN 300 MG: 300 CAPSULE ORAL at 08:19

## 2022-01-01 RX ADMIN — VANCOMYCIN HYDROCHLORIDE 1000 MG: 1 INJECTION, POWDER, LYOPHILIZED, FOR SOLUTION INTRAVENOUS at 02:35

## 2022-01-01 RX ADMIN — MAGNESIUM SULFATE HEPTAHYDRATE 4000 MG: 40 INJECTION, SOLUTION INTRAVENOUS at 06:38

## 2022-01-01 RX ADMIN — IOPAMIDOL 75 ML: 755 INJECTION, SOLUTION INTRAVENOUS at 18:46

## 2022-01-01 RX ADMIN — MORPHINE SULFATE 4 MG: 4 INJECTION, SOLUTION INTRAMUSCULAR; INTRAVENOUS at 10:12

## 2022-01-01 RX ADMIN — ROSUVASTATIN CALCIUM 5 MG: 10 TABLET, FILM COATED ORAL at 20:26

## 2022-01-01 RX ADMIN — GABAPENTIN 300 MG: 300 CAPSULE ORAL at 16:52

## 2022-01-01 RX ADMIN — GABAPENTIN 300 MG: 300 CAPSULE ORAL at 17:30

## 2022-01-01 RX ADMIN — INSULIN GLARGINE 18 UNITS: 100 INJECTION, SOLUTION SUBCUTANEOUS at 20:36

## 2022-01-01 RX ADMIN — OXYCODONE 2.5 MG: 5 TABLET ORAL at 08:43

## 2022-01-01 RX ADMIN — CEFEPIME 2000 MG: 2 INJECTION, POWDER, FOR SOLUTION INTRAVENOUS at 22:28

## 2022-01-01 RX ADMIN — FENTANYL CITRATE 50 MCG: 50 INJECTION INTRAMUSCULAR; INTRAVENOUS at 13:16

## 2022-01-01 RX ADMIN — HYDROCORTISONE: 0.01 CREAM TOPICAL at 10:08

## 2022-01-01 RX ADMIN — POTASSIUM PHOSPHATE, MONOBASIC 500 MG: 500 TABLET, SOLUBLE ORAL at 08:50

## 2022-01-01 RX ADMIN — POTASSIUM CHLORIDE 10 MEQ: 7.46 INJECTION, SOLUTION INTRAVENOUS at 07:39

## 2022-01-01 RX ADMIN — SILVER SULFADIAZINE: 10 CREAM TOPICAL at 10:07

## 2022-01-01 RX ADMIN — SODIUM CHLORIDE 1000 ML: 9 INJECTION, SOLUTION INTRAVENOUS at 22:20

## 2022-01-01 RX ADMIN — Medication 10 MEQ: at 08:00

## 2022-01-01 RX ADMIN — ONDANSETRON 4 MG: 2 INJECTION INTRAMUSCULAR; INTRAVENOUS at 20:18

## 2022-01-01 RX ADMIN — PROPOFOL 75 MCG/KG/MIN: 10 INJECTION, EMULSION INTRAVENOUS at 13:25

## 2022-01-01 RX ADMIN — INSULIN GLARGINE 18 UNITS: 100 INJECTION, SOLUTION SUBCUTANEOUS at 22:14

## 2022-01-01 RX ADMIN — HYDROMORPHONE HYDROCHLORIDE 0.5 MG: 1 INJECTION, SOLUTION INTRAMUSCULAR; INTRAVENOUS; SUBCUTANEOUS at 14:43

## 2022-01-01 RX ADMIN — CALCIUM GLUCONATE 2000 MG: 20 INJECTION, SOLUTION INTRAVENOUS at 07:05

## 2022-01-01 RX ADMIN — IOPAMIDOL 50 ML: 755 INJECTION, SOLUTION INTRAVENOUS at 21:24

## 2022-01-01 RX ADMIN — VANCOMYCIN HYDROCHLORIDE 1000 MG: 1 INJECTION, POWDER, LYOPHILIZED, FOR SOLUTION INTRAVENOUS at 12:19

## 2022-01-01 RX ADMIN — INSULIN LISPRO 2 UNITS: 100 INJECTION, SOLUTION INTRAVENOUS; SUBCUTANEOUS at 22:14

## 2022-01-01 RX ADMIN — MAGNESIUM SULFATE HEPTAHYDRATE 2000 MG: 40 INJECTION, SOLUTION INTRAVENOUS at 05:53

## 2022-01-01 RX ADMIN — POTASSIUM CHLORIDE 20 MEQ: 29.8 INJECTION, SOLUTION INTRAVENOUS at 21:56

## 2022-01-01 RX ADMIN — MEROPENEM 500 MG: 500 INJECTION, POWDER, FOR SOLUTION INTRAVENOUS at 10:17

## 2022-01-01 RX ADMIN — Medication 10 MEQ: at 07:04

## 2022-01-01 RX ADMIN — CEFEPIME HYDROCHLORIDE 2000 MG: 2 INJECTION, POWDER, FOR SOLUTION INTRAVENOUS at 20:48

## 2022-01-01 RX ADMIN — METRONIDAZOLE 500 MG: 500 INJECTION, SOLUTION INTRAVENOUS at 08:04

## 2022-01-01 RX ADMIN — MEROPENEM 500 MG: 500 INJECTION, POWDER, FOR SOLUTION INTRAVENOUS at 16:02

## 2022-01-01 RX ADMIN — GABAPENTIN 900 MG: 300 CAPSULE ORAL at 22:04

## 2022-01-01 RX ADMIN — ACETAMINOPHEN 650 MG: 325 TABLET ORAL at 19:33

## 2022-01-01 RX ADMIN — GABAPENTIN 300 MG: 300 CAPSULE ORAL at 14:42

## 2022-01-01 RX ADMIN — MORPHINE SULFATE 2 MG: 2 INJECTION, SOLUTION INTRAMUSCULAR; INTRAVENOUS at 09:02

## 2022-01-01 RX ADMIN — SODIUM CHLORIDE, PRESERVATIVE FREE 10 ML: 5 INJECTION INTRAVENOUS at 09:03

## 2022-01-01 RX ADMIN — OXYCODONE 2.5 MG: 5 TABLET ORAL at 22:05

## 2022-01-01 RX ADMIN — SODIUM CHLORIDE 50 ML: 9 INJECTION, SOLUTION INTRAVENOUS at 06:45

## 2022-01-01 RX ADMIN — MORPHINE SULFATE 2 MG: 2 INJECTION, SOLUTION INTRAMUSCULAR; INTRAVENOUS at 08:06

## 2022-01-01 RX ADMIN — GABAPENTIN 300 MG: 300 CAPSULE ORAL at 12:28

## 2022-01-01 RX ADMIN — METRONIDAZOLE 500 MG: 500 INJECTION, SOLUTION INTRAVENOUS at 23:13

## 2022-01-01 RX ADMIN — METRONIDAZOLE 500 MG: 500 INJECTION, SOLUTION INTRAVENOUS at 16:54

## 2022-01-01 RX ADMIN — MIDAZOLAM 1 MG: 1 INJECTION INTRAMUSCULAR; INTRAVENOUS at 13:16

## 2022-01-01 RX ADMIN — POTASSIUM CHLORIDE 10 MEQ: 7.46 INJECTION, SOLUTION INTRAVENOUS at 05:58

## 2022-01-01 RX ADMIN — HYDROCORTISONE: 0.01 CREAM TOPICAL at 15:57

## 2022-01-01 RX ADMIN — CEFEPIME HYDROCHLORIDE 2000 MG: 2 INJECTION, POWDER, FOR SOLUTION INTRAVENOUS at 10:04

## 2022-01-01 RX ADMIN — CALCIUM GLUCONATE 1000 MG: 20 INJECTION, SOLUTION INTRAVENOUS at 12:37

## 2022-01-01 RX ADMIN — ZOLEDRONIC ACID 4 MG: 4 INJECTION, SOLUTION, CONCENTRATE INTRAVENOUS at 14:38

## 2022-01-01 RX ADMIN — SODIUM CHLORIDE 1000 ML: 9 INJECTION, SOLUTION INTRAVENOUS at 18:55

## 2022-01-01 RX ADMIN — OXYCODONE 2.5 MG: 5 TABLET ORAL at 18:39

## 2022-01-01 RX ADMIN — DEXTROSE MONOHYDRATE 125 ML: 100 INJECTION, SOLUTION INTRAVENOUS at 02:25

## 2022-01-01 RX ADMIN — VANCOMYCIN HYDROCHLORIDE 1000 MG: 1 INJECTION, POWDER, LYOPHILIZED, FOR SOLUTION INTRAVENOUS at 00:29

## 2022-01-01 RX ADMIN — GABAPENTIN 900 MG: 300 CAPSULE ORAL at 22:05

## 2022-01-01 RX ADMIN — OXYCODONE 2.5 MG: 5 TABLET ORAL at 22:04

## 2022-01-01 RX ADMIN — ONDANSETRON 4 MG: 2 INJECTION INTRAMUSCULAR; INTRAVENOUS at 08:06

## 2022-01-01 RX ADMIN — OXYCODONE 2.5 MG: 5 TABLET ORAL at 11:15

## 2022-01-01 RX ADMIN — Medication 10 MEQ: at 10:10

## 2022-01-01 RX ADMIN — MAGNESIUM SULFATE HEPTAHYDRATE 2000 MG: 40 INJECTION, SOLUTION INTRAVENOUS at 12:32

## 2022-01-01 RX ADMIN — SODIUM CHLORIDE: 9 INJECTION, SOLUTION INTRAVENOUS at 00:25

## 2022-01-01 RX ADMIN — CEFEPIME HYDROCHLORIDE 2000 MG: 2 INJECTION, POWDER, FOR SOLUTION INTRAVENOUS at 00:26

## 2022-01-01 RX ADMIN — POTASSIUM PHOSPHATE, MONOBASIC 500 MG: 500 TABLET, SOLUBLE ORAL at 13:01

## 2022-01-01 RX ADMIN — POTASSIUM CHLORIDE 20 MEQ: 1500 TABLET, EXTENDED RELEASE ORAL at 17:15

## 2022-01-01 RX ADMIN — GABAPENTIN 900 MG: 300 CAPSULE ORAL at 20:45

## 2022-01-01 RX ADMIN — MORPHINE SULFATE 4 MG: 4 INJECTION, SOLUTION INTRAMUSCULAR; INTRAVENOUS at 21:17

## 2022-01-01 RX ADMIN — INSULIN LISPRO 2 UNITS: 100 INJECTION, SOLUTION INTRAVENOUS; SUBCUTANEOUS at 17:22

## 2022-01-01 RX ADMIN — OXYCODONE 2.5 MG: 5 TABLET ORAL at 13:39

## 2022-01-01 RX ADMIN — FERROUS SULFATE TAB EC 324 MG (65 MG FE EQUIVALENT) 324 MG: 324 (65 FE) TABLET DELAYED RESPONSE at 11:15

## 2022-01-01 RX ADMIN — OXYCODONE 5 MG: 5 TABLET ORAL at 15:28

## 2022-01-01 RX ADMIN — MEROPENEM 500 MG: 500 INJECTION, POWDER, FOR SOLUTION INTRAVENOUS at 04:26

## 2022-01-01 RX ADMIN — MORPHINE SULFATE 4 MG: 4 INJECTION, SOLUTION INTRAMUSCULAR; INTRAVENOUS at 17:53

## 2022-01-01 RX ADMIN — OXYCODONE 2.5 MG: 5 TABLET ORAL at 20:26

## 2022-01-01 RX ADMIN — OXYCODONE 2.5 MG: 5 TABLET ORAL at 13:30

## 2022-01-01 RX ADMIN — METRONIDAZOLE 500 MG: 500 INJECTION, SOLUTION INTRAVENOUS at 07:49

## 2022-01-01 RX ADMIN — POTASSIUM CHLORIDE AND SODIUM CHLORIDE: 900; 300 INJECTION, SOLUTION INTRAVENOUS at 04:54

## 2022-01-01 ASSESSMENT — PAIN DESCRIPTION - FREQUENCY
FREQUENCY: CONTINUOUS

## 2022-01-01 ASSESSMENT — PAIN DESCRIPTION - LOCATION
LOCATION: PERINEUM;BUTTOCKS
LOCATION: COCCYX;PERINEUM
LOCATION: VAGINA;BUTTOCKS
LOCATION: COCCYX;PERINEUM
LOCATION: VAGINA;VULVA
LOCATION: COCCYX;GROIN;PERINEUM
LOCATION: PERINEUM
LOCATION: PERINEUM
LOCATION: BUTTOCKS;PERINEUM
LOCATION: LEG
LOCATION: BUTTOCKS;VAGINA
LOCATION: PERINEUM
LOCATION: BUTTOCKS;PERINEUM
LOCATION: VAGINA
LOCATION: VAGINA
LOCATION: ABDOMEN;PELVIS
LOCATION: PERINEUM
LOCATION: VAGINA
LOCATION: GENERALIZED
LOCATION: VAGINA;VULVA
LOCATION: VAGINA;VULVA
LOCATION: PERINEUM
LOCATION: VAGINA;VULVA
LOCATION: PERINEUM
LOCATION: VAGINA
LOCATION: VULVA;VAGINA
LOCATION: VAGINA;VULVA
LOCATION: VAGINA
LOCATION: VULVA;VAGINA
LOCATION: VAGINA;VULVA

## 2022-01-01 ASSESSMENT — PAIN DESCRIPTION - ORIENTATION
ORIENTATION: MID
ORIENTATION: RIGHT;LEFT;MID
ORIENTATION: MID
ORIENTATION: RIGHT;LEFT;MID
ORIENTATION: MID
ORIENTATION: LOWER;POSTERIOR;ANTERIOR
ORIENTATION: LEFT
ORIENTATION: RIGHT;MID
ORIENTATION: MID
ORIENTATION: OTHER (COMMENT)
ORIENTATION: MID
ORIENTATION: RIGHT;LEFT
ORIENTATION: RIGHT;LEFT
ORIENTATION: MID

## 2022-01-01 ASSESSMENT — PAIN DESCRIPTION - DESCRIPTORS
DESCRIPTORS: DISCOMFORT
DESCRIPTORS: TENDER;BURNING
DESCRIPTORS: BURNING
DESCRIPTORS: BURNING;ACHING
DESCRIPTORS: BURNING;DISCOMFORT
DESCRIPTORS: ACHING;THROBBING
DESCRIPTORS: BURNING
DESCRIPTORS: ACHING;DISCOMFORT;THROBBING
DESCRIPTORS: ACHING;DISCOMFORT
DESCRIPTORS: BURNING
DESCRIPTORS: ACHING;BURNING
DESCRIPTORS: ACHING;BURNING;DISCOMFORT
DESCRIPTORS: BURNING;DISCOMFORT
DESCRIPTORS: ACHING;DISCOMFORT
DESCRIPTORS: ACHING;BURNING
DESCRIPTORS: DISCOMFORT
DESCRIPTORS: ACHING;BURNING
DESCRIPTORS: BURNING
DESCRIPTORS: ACHING;BURNING
DESCRIPTORS: ACHING;DISCOMFORT;BURNING
DESCRIPTORS: BURNING;DISCOMFORT
DESCRIPTORS: BURNING;DISCOMFORT
DESCRIPTORS: ACHING
DESCRIPTORS: ACHING;BURNING
DESCRIPTORS: ACHING
DESCRIPTORS: ACHING;DISCOMFORT
DESCRIPTORS: BURNING
DESCRIPTORS: ACHING;BURNING;DISCOMFORT;NAGGING
DESCRIPTORS: BURNING;SORE
DESCRIPTORS: BURNING
DESCRIPTORS: ACHING;DISCOMFORT

## 2022-01-01 ASSESSMENT — PAIN - FUNCTIONAL ASSESSMENT
PAIN_FUNCTIONAL_ASSESSMENT: PREVENTS OR INTERFERES SOME ACTIVE ACTIVITIES AND ADLS
PAIN_FUNCTIONAL_ASSESSMENT: ACTIVITIES ARE NOT PREVENTED
PAIN_FUNCTIONAL_ASSESSMENT: PREVENTS OR INTERFERES SOME ACTIVE ACTIVITIES AND ADLS
PAIN_FUNCTIONAL_ASSESSMENT: PREVENTS OR INTERFERES SOME ACTIVE ACTIVITIES AND ADLS
PAIN_FUNCTIONAL_ASSESSMENT: 0-10
PAIN_FUNCTIONAL_ASSESSMENT: PREVENTS OR INTERFERES SOME ACTIVE ACTIVITIES AND ADLS
PAIN_FUNCTIONAL_ASSESSMENT: PREVENTS OR INTERFERES WITH MANY ACTIVE NOT PASSIVE ACTIVITIES
PAIN_FUNCTIONAL_ASSESSMENT: PREVENTS OR INTERFERES SOME ACTIVE ACTIVITIES AND ADLS
PAIN_FUNCTIONAL_ASSESSMENT: 0-10
PAIN_FUNCTIONAL_ASSESSMENT: PREVENTS OR INTERFERES SOME ACTIVE ACTIVITIES AND ADLS
PAIN_FUNCTIONAL_ASSESSMENT: 0-10

## 2022-01-01 ASSESSMENT — PAIN DESCRIPTION - PAIN TYPE
TYPE: CHRONIC PAIN
TYPE: ACUTE PAIN
TYPE: ACUTE PAIN
TYPE: SURGICAL PAIN
TYPE: ACUTE PAIN
TYPE: SURGICAL PAIN
TYPE: ACUTE PAIN
TYPE: CHRONIC PAIN
TYPE: ACUTE PAIN

## 2022-01-01 ASSESSMENT — PAIN SCALES - GENERAL
PAINLEVEL_OUTOF10: 0
PAINLEVEL_OUTOF10: 8
PAINLEVEL_OUTOF10: 3
PAINLEVEL_OUTOF10: 10
PAINLEVEL_OUTOF10: 8
PAINLEVEL_OUTOF10: 0
PAINLEVEL_OUTOF10: 7
PAINLEVEL_OUTOF10: 0
PAINLEVEL_OUTOF10: 8
PAINLEVEL_OUTOF10: 2
PAINLEVEL_OUTOF10: 9
PAINLEVEL_OUTOF10: 7
PAINLEVEL_OUTOF10: 5
PAINLEVEL_OUTOF10: 4
PAINLEVEL_OUTOF10: 8
PAINLEVEL_OUTOF10: 3
PAINLEVEL_OUTOF10: 3
PAINLEVEL_OUTOF10: 7
PAINLEVEL_OUTOF10: 8
PAINLEVEL_OUTOF10: 5
PAINLEVEL_OUTOF10: 10
PAINLEVEL_OUTOF10: 8
PAINLEVEL_OUTOF10: 4
PAINLEVEL_OUTOF10: 8
PAINLEVEL_OUTOF10: 0
PAINLEVEL_OUTOF10: 7
PAINLEVEL_OUTOF10: 9
PAINLEVEL_OUTOF10: 7
PAINLEVEL_OUTOF10: 7
PAINLEVEL_OUTOF10: 9
PAINLEVEL_OUTOF10: 0
PAINLEVEL_OUTOF10: 9
PAINLEVEL_OUTOF10: 8
PAINLEVEL_OUTOF10: 8
PAINLEVEL_OUTOF10: 5
PAINLEVEL_OUTOF10: 7
PAINLEVEL_OUTOF10: 4
PAINLEVEL_OUTOF10: 0
PAINLEVEL_OUTOF10: 8
PAINLEVEL_OUTOF10: 5
PAINLEVEL_OUTOF10: 2
PAINLEVEL_OUTOF10: 10
PAINLEVEL_OUTOF10: 8
PAINLEVEL_OUTOF10: 4
PAINLEVEL_OUTOF10: 7
PAINLEVEL_OUTOF10: 8
PAINLEVEL_OUTOF10: 8
PAINLEVEL_OUTOF10: 7
PAINLEVEL_OUTOF10: 10
PAINLEVEL_OUTOF10: 10
PAINLEVEL_OUTOF10: 8
PAINLEVEL_OUTOF10: 3
PAINLEVEL_OUTOF10: 7
PAINLEVEL_OUTOF10: 10
PAINLEVEL_OUTOF10: 4
PAINLEVEL_OUTOF10: 10
PAINLEVEL_OUTOF10: 3
PAINLEVEL_OUTOF10: 8
PAINLEVEL_OUTOF10: 9
PAINLEVEL_OUTOF10: 7
PAINLEVEL_OUTOF10: 8
PAINLEVEL_OUTOF10: 5
PAINLEVEL_OUTOF10: 5

## 2022-01-01 ASSESSMENT — ENCOUNTER SYMPTOMS
NAUSEA: 0
EYE DISCHARGE: 0
VOMITING: 0
SHORTNESS OF BREATH: 1
SINUS PRESSURE: 0
COUGH: 0
SORE THROAT: 0
DIARRHEA: 0
ABDOMINAL PAIN: 0
SINUS PAIN: 0
NAUSEA: 0
CONSTIPATION: 0
WHEEZING: 0
ABDOMINAL PAIN: 0
BACK PAIN: 0
COUGH: 0
ABDOMINAL DISTENTION: 0
SHORTNESS OF BREATH: 0
SHORTNESS OF BREATH: 1
SHORTNESS OF BREATH: 0
RHINORRHEA: 0
EYE REDNESS: 0

## 2022-01-01 ASSESSMENT — PAIN DESCRIPTION - ONSET
ONSET: ON-GOING
ONSET: PROGRESSIVE
ONSET: PROGRESSIVE
ONSET: ON-GOING
ONSET: ON-GOING
ONSET: PROGRESSIVE
ONSET: ON-GOING
ONSET: PROGRESSIVE
ONSET: ON-GOING

## 2022-01-01 ASSESSMENT — PATIENT HEALTH QUESTIONNAIRE - PHQ9
1. LITTLE INTEREST OR PLEASURE IN DOING THINGS: 1
SUM OF ALL RESPONSES TO PHQ QUESTIONS 1-9: 2
SUM OF ALL RESPONSES TO PHQ9 QUESTIONS 1 & 2: 2
SUM OF ALL RESPONSES TO PHQ QUESTIONS 1-9: 2
2. FEELING DOWN, DEPRESSED OR HOPELESS: 1
SUM OF ALL RESPONSES TO PHQ QUESTIONS 1-9: 2
SUM OF ALL RESPONSES TO PHQ QUESTIONS 1-9: 2

## 2022-01-01 ASSESSMENT — PAIN SCALES - WONG BAKER
WONGBAKER_NUMERICALRESPONSE: 0

## 2022-01-01 ASSESSMENT — LIFESTYLE VARIABLES
HOW OFTEN DO YOU HAVE A DRINK CONTAINING ALCOHOL: NEVER
HOW MANY STANDARD DRINKS CONTAINING ALCOHOL DO YOU HAVE ON A TYPICAL DAY: PATIENT DOES NOT DRINK
HOW OFTEN DO YOU HAVE A DRINK CONTAINING ALCOHOL: NEVER

## 2022-04-18 PROBLEM — E11.40 TYPE 2 DIABETES MELLITUS WITH DIABETIC NEUROPATHY (HCC): Status: ACTIVE | Noted: 2022-01-01

## 2022-04-18 NOTE — PATIENT INSTRUCTIONS
Patient Education        Learning About Meal Planning for Diabetes  Why plan your meals? Meal planning can be a key part of managing diabetes. Planning meals and snacks with the right balance of carbohydrate, protein, and fat can help you keep yourblood sugar at the target level you set with your doctor. You don't have to eat special foods. You can eat what your family eats, including sweets once in a while. But you do have to pay attention to how oftenyou eat and how much you eat of certain foods. You may want to work with a dietitian or a diabetes educator. They can give you tips and meal ideas and can answer your questions about meal planning. This health professional can also help you reach a healthy weight if that is one ofyour goals. What plan is right for you? Your dietitian or diabetes educator may suggest that you start with the plateformat or carbohydrate counting. The plate format  The plate format is a simple way to help you manage how you eat. You plan meals by learning how much space each food should take on a plate. Using the plate format helps you manage the amount of carbohydrate you eat. It can make it easier to keep your blood sugar level within your target range. It also helpsyou see if you're eating healthy portion sizes. To use the plate format, you put non-starchy vegetables on half your plate. Add lean protein foods, such as fish, lean meats and poultry, or soy products, on one-quarter of the plate. Put a grain or starchy vegetable (such as brown rice or a potato) on the final quarter of the plate. You can add a small piece of fruit and some low-fat or fat-free milk or yogurt, depending on yourcarbohydrate goal for each meal.  Here are some tips for using the plate format:   Make sure that you are not using an oversized plate. A 9-inch plate is best. Many restaurants use larger plates.  Get used to using the plate format at home. Then you can use it when you eat out.    Write down your questions about using the plate format. Talk to your doctor, a dietitian, or a diabetes educator about your concerns. Carbohydrate counting  With carbohydrate counting, you plan meals based on the amount of carbohydrate in each food. Carbohydrate raises blood sugar higher and more quickly than any other nutrient. It is found in desserts, breads and cereals, and fruit. It's also found in starchy vegetables such as potatoes and corn, grains such as rice and pasta, and milk and yogurt. You can help keep your blood sugar levels within your target range by planning how much carbohydrate to have at meals andsnacks. The amount you need depends on several things. These include your weight, how active you are, which diabetes medicines you take, and what your goals are for your blood sugar levels. A registered dietitian or diabetes educator can helpyou plan how much carbohydrate to include in each meal and snack. An example of a carbohydrate counting plan is:   45 to 60 grams at each meal. That's about the same as 3 to 4 carbohydrate servings.  15 to 20 grams at each snack. That's about the same as 1 carbohydrate serving. The Nutrition Facts label on packaged foods tells you how much carbohydrate is in a serving of the food. First, look at the serving size on the food label. Is that the amount you eat in a serving? All of the nutrition information on a food label is based on that serving size. So if you eat more or less than that, you'll need to adjust the other numbers. Total carbohydrate is the next thing you need to look for on the label. If you count carbohydrate servings, oneserving of carbohydrate is 15 grams. For foods that don't come with labels, such as fresh fruits and vegetables, you'll need a guide that lists carbohydrate in these foods. Ask your doctor, dietitian, or diabetes educator about books or other nutrition guides you canuse.   If you take insulin, you need to know how many grams of carbohydrate are in a meal. This lets you know how much rapid-acting insulin to take before you eat. If you use an insulin pump, you get a constant rate of insulin during the day. So the pump must be programmed at meals to give you extra insulin to cover therise in blood sugar after meals. When you know how much carbohydrate you will eat, you can take the right amount of insulin. Or, if you always use the same amount of insulin, you need to [facility] that you eat the same amount of carbohydrate at meals. If you need more help to understand carbohydrate counting and food labels, askyour doctor, dietitian, or diabetes educator. How can you plan healthy meals? Here are some tips to get started:  [facility] your meals a week at a time. Don't forget to include snacks too.  Use cookbooks or online recipes to plan several main meals. Plan some quick meals for busy nights. You also can double some recipes that freeze well. Then you can save half for other busy nights when you don't have time to cook.  Make sure you have the ingredients you need for your recipes. If you're running low on basic items, put these items on your shopping list too.  List foods that you use to make breakfasts, lunches, and snacks. List plenty of fruits and vegetables.  Post this list on the refrigerator. Add to it as you think of more things you need.  Take the list to the store to do your weekly shopping. Follow-up care is a key part of your treatment and safety. Be sure to make and go to all appointments, and call your doctor if you are having problems. It's also a good idea to know your test results and keep alist of the medicines you take. Where can you learn more? Go to [email or web address]://[email or web address].travayl. org and sign in to your SweetSpot [employer] account. Enter L380 in the [employer] box to learn more about \"Learning About Meal Planning for Diabetes. \"     If you do not have an account, please click on the \"Sign Up Now\" link.  Current as of: September 8, 2021               Content Version: 13.2  © 2006-2022 Healthwise, [employer]. Care instructions adapted under license by [facility] ([facility]). If you have questions about a medical condition or this instruction, always ask your healthcare professional. Zacariasmaynorägen Jessie any warranty or liability for your use of this information. Patient Education        Learning About Type 1 Diabetes  What is type 1 diabetes? Type 1 diabetes is a disease that starts when the pancreas stops making enough of a hormone called insulin. Insulin helps your body use sugar from your food as energy or store it for later use. If you don't have insulin, too much sugar stays in your blood. Over time, high blood sugar can harm many parts of thebody, such as the eyes, heart, blood vessels, nerves, and kidneys. Type 1 diabetes can occur at any age, but it usually starts in children or young adults. It's a lifelong disease. But with treatment and a healthylifestyle, you can live a long and healthy life. What happens when you have type 1 diabetes? Over time, high blood sugar can lead to serious problems. It can:   Harm your eyes, nerves, and kidneys.  Damage your blood vessels, leading to heart disease and stroke.  Reduce blood flow and cause nerve damage to parts of your body, especially your feet. This can cause slow healing and pain when you walk. That's why it's important to keep your blood sugar within a target range. A more sudden problem can happen when the blood sugar level gets so high that a serious chemical imbalance develops in the blood. This condition can belife-threatening and needs quick treatment. When people hear the word \"diabetes,\" they often think of problems like these. But daily care and treatment can help prevent or delay these problems. The goal is to keep your blood sugar in a target range.  It's the best way to reduce yourchance of having more problems from diabetes. What are the symptoms? You have most symptoms of type 1 diabetes when your blood sugar is either toohigh or too low. Common symptoms of high blood sugar include:   Thirst.   Needing to urinate often.  Weight loss.  Blurry vision. Common symptoms of low blood sugar include:   Sweating.  Shakiness.  Weakness.  Hunger.  Confusion. If you wait too long to get medical care when your blood sugar goes too high,you may develop diabetic ketoacidosis. Symptoms include:   Flushed, hot, dry skin.  A strong, fruity breath odor.  Feeling restless or drowsy, or having trouble waking up.  Lack of interest in normal activities.  Rapid, deep breathing.  Loss of appetite, belly pain, and vomiting.  Confusion. How is type 1 diabetes treated? Treatment for type 1 diabetes focuses on keeping blood sugar levels within a target range. This will help prevent problems from diabetes such as eye,kidney, heart, and nerve disease. To manage type 1 diabetes, you'll:   Take insulin every day. You may give it through an insulin pump or a syringe (needle).  Check your blood sugar levels often.  Make healthy food choices.  Get regular physical activity. Exercise helps the body to use insulin in a more efficient way.  Get regular medical checkups. You'll get routine screening tests and exams to watch for signs of problems.  Avoid smoking. Blood sugar levels are easier to manage when mealtimes, amount of food, andexercise are similar every day. You may need medicine to treat other health problems, like high blood pressureor high cholesterol. This may help prevent problems from diabetes. Follow-up care is a key part of your treatment and safety. Be sure to make and go to all appointments, and call your doctor if you are having problems. It's also a good idea to know your test results and keep alist of the medicines you take. Where can you learn more?   Go to https://[email or web address].[email or web address] org and sign in to your [employer] account. Enter D572 in the [facility] box to learn more about \"Learning About Type 1 Diabetes. \"     If you do not have an account, please click on the \"Sign Up Now\" link. Current as of: July 28, 2021               Content Version: 13.2  © 0910-9556 [employer]. Care instructions adapted under license by [facility] ([facility]). If you have questions about a medical condition or this instruction, always ask your healthcare professional. [place] [address] any warranty or liability for your use of this information. Patient Education        Hypoglycemia: Care Instructions  Overview     Hypoglycemia means that your blood sugar is low and your body is not getting enough fuel. Some people get low blood sugar from not eating often enough. Some medicines to treat diabetes can cause low blood sugar. People who have had surgery on their stomachs or intestines may get hypoglycemia. Problems with thepancreas, kidneys, or liver also can cause low blood sugar. A snack or drink with sugar in it will raise your blood sugar and should easeyour symptoms right away. Your doctor may recommend that you change or stop your medicines until you can get your blood sugar levels under control. In the long run, you may need to change your diet and eating habits so that you get enough fuel for your bodythroughout the day. Follow-up care is a key part of your treatment and safety. Be sure to make and go to all appointments, and call your doctor if you are having problems. It's also a good idea to know your test results and keep alist of the medicines you take. How can you care for yourself at home?  Learn your signs of low blood sugar. They are different for everyone. Some common early signs include:  ? Nausea. ? Hunger. ? Feeling nervous, irritable, or shaky. ? Cold, clammy skin. ?  Sweating (when you're not exercising).  Use the \"rule of 15\" to treat low blood sugar. This includes eating 15 grams of carbohydrate from a quick-sugar food, such as 3 or 4 glucose tablets or ½ cup of juice. Wait 15 minutes and check your blood sugar. If it is still below 70 mg/dL, eat another 15 grams of carbohydrate. Repeat this every 15 minutes until your blood sugar is in a safe target range.  Once your blood sugar is in a safe range, eat a snack or meal to prevent recurrent low blood sugar.  Make sure family, friends, and coworkers know the symptoms of low blood sugar and know how to get your sugar level up.  If you were prescribed a glucagon kit, always have it with you. Make sure friends and family know how to use it. When should you call for help? Call 911 anytime you think you may need emergency care. For example, call if:     You passed out (lost consciousness).      You are confused or cannot think clearly.      Your blood sugar is very high or very low. Watch closely for changes in your health, and be sure to contact your doctor if:     Your blood sugar stays outside the level your doctor set for you.      You have any problems. Where can you learn more? Go to https://[email or web address] [email or web address] org and sign in to your Sidecar account. Enter V413 in the [employer] box to learn more about \"Hypoglycemia: Care Instructions. \"     If you do not have an account, please click on the \"Sign Up Now\" link. Current as of: July 28, 2021               Content Version: 13.2  © 2006-2022 Healthwise, Incorporated. Care instructions adapted under license by [facility] ([facility]). If you have questions about a medical condition or this instruction, always ask your healthcare professional. [place] [address] any warranty or liability for your use of this information.          Patient Education        Neuropathic Pain: Care Instructions  Overview     Neuropathic pain is caused by pressure on or damage to your nerves. It's often simply called nerve pain. Some people feel this type of pain all the time. Forothers, it comes and goes. Diabetes, shingles, or an injury can cause nerve pain. Many people say the pain feels sharp, burning, or stabbing. But some people feel it as a dull ache. In some cases, it makes your skin very sensitive. So touch, pressure, and othersensations that did not hurt before may now cause pain. It's important to know that this kind of pain is real and can affect your quality of life. It's also important to know that treatment can help. Treatmentincludes pain medicines, exercise, and physical therapy. Medicines can help reduce the number of pain signals that travel over the nerves. This can make the painful areas less sensitive. It can also help you sleep better and improve your mood. But medicines are only one part ofsuccessful treatment. Most people do best with more than one kind of treatment. Your doctor mayrecommend that you try cognitive-behavioral therapy and stress management. If you feel that your treatment is not working, talk to your doctor. And be sure to tell your doctor if you think you might be depressed or anxious. Theseare common problems that can also be treated. Follow-up care is a key part of your treatment and safety. Be sure to make and go to all appointments, and call your doctor if you are having problems. It's also a good idea to know your test results and keep alist of the medicines you take. How can you care for yourself at home?  Be safe with medicines. Read and follow all instructions on the label. ? If the doctor gave you a prescription medicine for pain, take it as prescribed. ? If you are not taking a prescription pain medicine, ask your doctor if you can take an over-the-counter medicine.  Save hard tasks for days when you have less pain. Follow a hard task with an easy task. And remember to take breaks.  Relax, and reduce stress.  You may want to try deep breathing or meditation. These can help.  Keep moving. Gentle, daily exercise can help reduce pain. Your doctor or physical therapist can tell you what type of exercise is best for you. This may include walking, swimming, and stationary biking. It may also include stretches and range-of-motion exercises.  Try heat, cold packs, and massage.  Get enough sleep. Constant pain can make you more tired. If the pain makes it hard to sleep, talk with your doctor.  Think positively. Your thoughts can affect your pain. Do fun things to distract yourself from the pain. See a movie, read a book, listen to music, or spend time with a friend.  Keep a pain diary. Try to write down how strong your pain is and what it feels like. Also try to notice and write down how your moods, thoughts, sleep, activities, and medicine affect your pain. These notes can help you and your doctor find the best ways to treat your pain. Reducing constipation caused by pain medicine  Pain medicines often cause constipation. To reduce constipation:   Talk to your doctor about a laxative. If a laxative doesn't work, your doctor may suggest a prescription medicine.  Include fruits, vegetables, beans, and whole grains in your diet each day. These foods are high in fiber.  Get some exercise every day. Build up slowly to 30 to 60 minutes a day on 5 or more days of the week.  Take a fiber supplement, such as Citrucel or Metamucil, every day if needed. Read and follow all instructions on the label.  Schedule time each day for a bowel movement. Having a daily routine may help. Take your time and do not strain when having a bowel movement. When should you call for help? Call your doctor now or seek immediate medical care if:     You feel sad, anxious, or hopeless for more than a few days. This could mean you are depressed. Depression is common in people who have a lot of pain.  But it can be treated.      You have trouble with bowel movements, such as:  ? No bowel movement in 3 days. ? Blood in the anal area, in your stool, or on the toilet paper. ? Diarrhea for more than 24 hours. Watch closely for changes in your health, and be sure to contact your doctor if:     Your pain is getting worse.      You can't sleep because of pain.      You are very worried or anxious about your pain.      You have trouble taking your pain medicine.      You have any concerns about your pain medicine or its side effects.      You have vomiting or cramps for more than 2 hours. Where can you learn more? Go to [email or web address] [email or web address] org and sign in to your Goodwall account. Enter Z671 in the [employer] box to learn more about \"Neuropathic Pain: Care Instructions. \"     If you do not have an account, please click on the \"Sign Up Now\" link. Current as of: December 13, 2021               Content Version: 13.2  © 2006-2022 Healthwise, Incorporated. Care instructions adapted under license by [facility] ([facility]). If you have questions about a medical condition or this instruction, always ask your healthcare professional. [place] [address] any warranty or liability for your use of this information.

## 2022-04-18 NOTE — PROGRESS NOTES
Ashley Fletcher (:  1955) is a 77 y.o. female,Established patient, here for evaluation of the following chief complaint(s):    Diabetes (SIX MONTH FOLLOW UP, DUE FOR A1C)      SUBJECTIVE/OBJECTIVE:  HPI  ROUTINE DIABETES FOLLOW UP   SHE HAS LOST 26 POUNDS SINCE LAST VISIT  CUT DOWN PORTION SIZES EATING  LESS  LATUS 20 UNITS NIGHTLY  CHECKING BLOOD SUGARS 100 FASTING EVENING 130 140  SHE DOES NOT  WANT TO  CHANGE ANY MEDICATIONS  SHE DENIES ANY SIGNS OF HYPOGLYCEMIA  INCREASED THIRST FREQUENT URINATION  OR VISION CHANGES  SHE HAS HAD HER DENTAL EXAM  WILL SCHEDULE HER EYE EXAM  NEUROPATHY IN BOTH LEGS- THE NEURONTIN HELPS  Review of Systems   Constitutional: Negative for unexpected weight change. Cardiovascular: Negative. Endocrine: Negative. All other systems reviewed and are negative. Physical Exam  Vitals reviewed. Constitutional:       General: She is awake. She is not in acute distress. Appearance: Normal appearance. She is well-developed, well-groomed and normal weight. She is not ill-appearing, toxic-appearing or diaphoretic. Neck:      Thyroid: No thyromegaly. Vascular: No carotid bruit. Cardiovascular:      Rate and Rhythm: Normal rate and regular rhythm. Heart sounds: Normal heart sounds, S1 normal and S2 normal.   Pulmonary:      Effort: Pulmonary effort is normal.      Breath sounds: Normal breath sounds and air entry. Neurological:      Mental Status: She is alert and oriented to person, place, and time. Psychiatric:         Attention and Perception: Attention and perception normal.         Mood and Affect: Mood and affect normal.         Speech: Speech normal.         Behavior: Behavior normal. Behavior is cooperative. Thought Content: Thought content normal.         Cognition and Memory: Cognition and memory normal.         Judgment: Judgment normal.         Mary Carmen Dunlap was seen today for diabetes.     Diagnoses and all orders for this visit:    Type 2 diabetes mellitus with diabetic neuropathy, with long-term current use of insulin ([facility])    -     POCT glycosylated hemoglobin (Hb A1C) 6.0  Pt does not want to decrease or stop the lantus at this time  I discussed the risk of hypoglycemia with pt - she will continue to monitor fasting blood sugars - if less than 60 consistently she will stop insulin and inform me   Encouraged to schedule her eye exam  -     gabapentin (NEURONTIN) 300 MG capsule; FILL 11/3 1 tab in am 1 tab afternoon 1 tab at dinner 3 tabs at bedtime  -     insulin glargine (LANTUS;BASAGLAR) 100 UNIT/ML injection pen; Inject 20 Units into the skin nightly  She will follow up in six months- declined a three month follow up    Care Gaps Addressed  Colon cancer screening declined      An electronic signature was used to authenticate this note.     --Samuel Del Rosario, APRN - CNP

## 2022-05-29 PROBLEM — N30.01 ACUTE CYSTITIS WITH HEMATURIA: Status: ACTIVE | Noted: 2022-01-01

## 2022-05-29 PROBLEM — R65.21 SEPTIC SHOCK (HCC): Status: ACTIVE | Noted: 2022-01-01

## 2022-05-29 PROBLEM — K62.3 RECTAL PROLAPSE: Status: ACTIVE | Noted: 2022-01-01

## 2022-05-29 PROBLEM — D72.9 NEUTROPHILIC LEUKOCYTOSIS: Status: ACTIVE | Noted: 2022-01-01

## 2022-05-29 PROBLEM — R65.20 SEVERE SEPSIS (HCC): Status: ACTIVE | Noted: 2022-01-01

## 2022-05-29 PROBLEM — R53.83 FATIGUE: Status: ACTIVE | Noted: 2022-01-01

## 2022-05-29 PROBLEM — A41.9 SEPTIC SHOCK (HCC): Status: ACTIVE | Noted: 2022-01-01

## 2022-05-29 PROBLEM — R53.1 GENERALIZED WEAKNESS: Status: ACTIVE | Noted: 2022-01-01

## 2022-05-29 PROBLEM — E04.9 ENLARGED THYROID: Status: ACTIVE | Noted: 2022-01-01

## 2022-05-29 PROBLEM — R79.89 ELEVATED LIVER FUNCTION TESTS: Status: ACTIVE | Noted: 2022-01-01

## 2022-05-29 PROBLEM — A41.9 SEVERE SEPSIS (HCC): Status: ACTIVE | Noted: 2022-01-01

## 2022-05-29 PROBLEM — N90.89 VULVAR MASS: Status: ACTIVE | Noted: 2022-01-01

## 2022-05-29 PROBLEM — R79.89 ELEVATED LACTIC ACID LEVEL: Status: ACTIVE | Noted: 2022-01-01

## 2022-05-29 PROBLEM — R00.0 TACHYCARDIA: Status: ACTIVE | Noted: 2022-01-01

## 2022-05-29 NOTE — ED NOTES
Pt arrived in room 16, pt walking with walker and stand by assist      Isabelle Blair RN  05/29/22 0507

## 2022-05-29 NOTE — ED NOTES
Blood cultures successfully obtained with one attempt in Right [address] [facility]  05/29/22 [id number]

## 2022-05-29 NOTE — ED NOTES
Patient resting on side. , tylenol administered per request with an order from Dr. Scott Bueno. BP a bit soft, patient denies symptoms at this time.       Ramesh Booker RN  05/29/22 [id number]

## 2022-05-29 NOTE — ED PROVIDER NOTES
[id number] [address]        Pt Name: Wendy De La Cruz  MRN: [id number]  [place] 1955  Date of evaluation: 5/29/2022  Provider: HARPAL Jones  PCP: HERIBERTO Owens - CNP  Note Started: 5:17 PM EDT        I have seen and evaluated this patient with my supervising physician Aj Martinez MD.    [address] [place] [address]       Chief Complaint   Patient presents with    Extremity Weakness     generalized BLE weakness and \"shakey\" since Thursday, fell in the bathroom hit head on Friday night. no blood thinner and did have a loss of consciousness    Wound Check     large sore around the rectum, with a prolapsed rectum does not have a provider taking careof that.  Fatigue     over the past two to three weeks, has noticed a decreased to no appetite, losing weight of 28lbs       HISTORY OF PRESENT ILLNESS   (Location, Timing/Onset, Context/Setting, Quality, Duration, Modifying Factors, Severity, Associated Signs and Symptoms)  Note limiting factors. Chief Complaint: Extremity weakness, rectal/vaginal pain, fatigue, frequent falls, decreased appetite     Wendy De La Cruz is a 77 y.o. female who presents to the emergency department today with multiple complaints. Patient reports that she has had bilateral lower extremity weakness, shakiness, she has had 3 syncopal episodes in the bathroom. She states that she has hit her head on these falls. She also reports that for the last several weeks she has been more fatigued, decreased appetite, and had some weight loss. She also tells us that she has had a large sore around her rectal vaginal area, with a known history of a prolapsed rectum. Patient tells us that this sore has been present for several years, but seems to be getting worse in the past few months. She states she does have to wear 2 underwear or several pads or tucked in tissues to help absorb any discharge related to the sore.   She has never had this evaluated by anybody. She states that she decided to come in today for evaluation because she just does not feel well. She has no further complaints at this time. Nursing Notes were all reviewed and agreed with or any disagreements were addressed in the HPI. REVIEW OF SYSTEMS    (2-9 systems for level 4, 10 or more for level 5)     Review of Systems   Constitutional: Positive for fatigue and unexpected weight change. Negative for chills and fever. Respiratory: Negative for cough and shortness of breath. Cardiovascular: Negative for chest pain and palpitations. Gastrointestinal: Negative for abdominal distention, abdominal pain, nausea and vomiting. Hernia, no pain at this time   Genitourinary: Positive for vaginal discharge and vaginal pain. Negative for dysuria and frequency. Mass on vulva; h/o prolapsed rectum   Neurological: Positive for syncope, weakness and light-headedness. Positives and Pertinent negatives as per HPI. Except as noted above in the ROS, all other systems were reviewed and negative. PAST MEDICAL HISTORY     Past Medical History:   Diagnosis Date    Congenital prolapsed rectum     Displaced bladder     Enlarged, heterogeneous thyroid 5/29/2022    Hernia     Mixed hyperlipidemia 10/14/2019    Type 2 diabetes mellitus without complication ([facility] [address] 3/88/6553    Varicose vein of leg          SURGICAL HISTORY   History reviewed. No pertinent surgical history.       [address]       Current Discharge Medication List      CONTINUE these medications which have NOT CHANGED    Details   insulin aspart (NOVOLOG) 100 UNIT/ML injection vial Inject 5-6 Units into the skin daily      acetaminophen (TYLENOL) 325 mg tablet Take 650 mg by mouth every 6 hours as needed for Pain      chlorpheniramine (SB CHLORPHENIRAMINE) 4 MG tablet Take 4 mg by mouth every 6 hours as needed for Allergies      Homeopathic Products (RESTFUL LEGS SL) Place 1 tablet under the not toxic-appearing or diaphoretic. HENT:      Head: Normocephalic and atraumatic. Eyes:      Conjunctiva/sclera: Conjunctivae normal.      Pupils: Pupils are equal, round, and reactive to light. Cardiovascular:      Rate and Rhythm: Normal rate and regular rhythm. Pulmonary:      Effort: Pulmonary effort is normal. No respiratory distress. Breath sounds: Normal breath sounds. Abdominal:      General: Bowel sounds are normal.      Palpations: Abdomen is soft. Tenderness: There is no abdominal tenderness. Hernia: A hernia (umbilical, nontender, no overlying skin changes) is present. Genitourinary:      Musculoskeletal:      Cervical back: Normal range of motion and neck supple. Skin:     General: Skin is warm and dry. Neurological:      Mental Status: She is alert and oriented to person, place, and time. Psychiatric:         Behavior: Behavior normal. Behavior is cooperative. Thought Content:  Thought content normal.         DIAGNOSTIC RESULTS   LABS:    Labs Reviewed   CBC WITH AUTO DIFFERENTIAL - Abnormal; Notable for the following components:       Result Value    WBC 25.2 (*)     Hemoglobin 11.0 (*)     MCH 25.1 (*)     MCHC 30.3 (*)     RDW 16.4 (*)     Platelets 947 (*)     Neutrophils Absolute 21.7 (*)     All other components within normal limits   COMPREHENSIVE METABOLIC PANEL W/ REFLEX TO MG FOR LOW K - Abnormal; Notable for the following components:    Glucose 106 (*)     BUN 25 (*)     Calcium 13.9 (*)     Albumin 3.1 (*)     Albumin/Globulin Ratio 0.8 (*)     Alkaline Phosphatase 132 (*)     ALT 71 (*)      (*)     All other components within normal limits    Narrative:     Chapin Gordon tel. [phone],  Chemistry results called to and read back by Tracy Hodge RN, 05/29/2022  18:38, by [facility]   LACTATE, SEPSIS - Abnormal; Notable for the following components:    Lactic Acid, Sepsis 2.0 (*)     All other components within normal limits   TROPONIN - Abnormal; Notable for the following components:    Troponin 0.02 (*)     All other components within normal limits   URINALYSIS WITH REFLEX TO CULTURE - Abnormal; Notable for the following components:    Clarity, UA TURBID (*)     Ketones, Urine 15 (*)     Blood, Urine LARGE (*)     Protein, UA 30 (*)     Leukocyte Esterase, Urine MODERATE (*)     All other components within normal limits   MICROSCOPIC URINALYSIS - Abnormal; Notable for the following components:    Bacteria, UA 4+ (*)     Crystals, UA 3+ Uric Acid (*)     Hyaline Casts, UA 17 (*)     WBC,  (*)     RBC,  (*)     Epithelial Cells, UA 14 (*)     Mucus, UA Present (*)     All other components within normal limits   POCT GLUCOSE - Abnormal; Notable for the following components:    POC Glucose 126 (*)     All other components within normal limits   CULTURE, BLOOD 1   CULTURE, BLOOD 2   CULTURE, URINE   LACTIC ACID   TROPONIN   LACTIC ACID   TROPONIN   CBC WITH AUTO DIFFERENTIAL   COMPREHENSIVE METABOLIC PANEL W/ REFLEX TO MG FOR LOW K   LACTIC ACID   POCT GLUCOSE   POCT GLUCOSE       When ordered only abnormal lab results are displayed. All other labs were within normal range or not returned as of this dictation. EKG: When ordered, EKG's are interpreted by the Emergency Department Physician in the absence of a cardiologist.  Please see their note for interpretation of EKG. RADIOLOGY:   Non-plain film images such as CT, Ultrasound and MRI are read by the radiologist. Plain radiographic images are visualized and preliminarily interpreted by the ED Provider with the below findings:        Interpretation per the Radiologist below, if available at the time of this note:    CT CHEST [address]   Final Result   1. No acute intrathoracic abnormality. 2. Large heterogeneously enhancing mass in the right vulva/perineum is   concerning for a malignant process. There is also gas within this lesion,   suggesting ulceration. Clinical correlation and tissue sampling are   recommended for further evaluation. 3. Bilateral inguinal lymphadenopathy. 4. Enlarged, heterogeneous thyroid. Ultrasound is recommended. RECOMMENDATIONS:   Incidental heterogeneous and enlarged thyroid. Recommend thyroid US. Reference: J Am Riana Radiol. 2015 Feb;12(2): 143-50         CT Cervical Spine WO Contrast   Final Result   1. No acute abnormality of the cervical spine. 2. Multilevel degenerative changes. RECOMMENDATIONS:   1 cm incidental right thyroid nodule. No follow-up imaging is recommended. Reference: J Am Riana Radiol. 2015 Feb;12(2): 143-50         CT Head WO Contrast   Final Result   No acute intracranial abnormality. XR CHEST PORTABLE   Final Result   No acute cardiopulmonary abnormality. No results found.         PROCEDURES   Unless otherwise noted below, none     Procedures      CONSULTS:  IP CONSULT TO HOSPITALIST  IP CONSULT TO OB GYN  PHARMACY TO DOSE VANCOMYCIN  IP CONSULT TO OB GYN      EMERGENCY DEPARTMENT COURSE and DIFFERENTIAL DIAGNOSIS/MDM:   Vitals:    Vitals:    05/29/22 1959 05/29/22 2038 05/29/22 2200 05/29/22 2244   BP: (!) 91/48 (!) 93/49 (!) 89/48 (!) 99/53   Pulse: 73 76 66 68   Resp: 15 17 12 13   Temp:       TempSrc:    Oral   SpO2: 100% 100% 100% 95%   Weight:    111 lb 8.8 oz (50.6 kg)   Height:    5' 1\" (1.549 m)       Patient was given the following medications:  Medications   metronidazole (FLAGYL) 500 mg in 0.9% NaCl 100 mL IVPB premix (500 mg IntraVENous New Bag 5/29/22 2313)   cefepime (MAXIPIME) 2000 mg IVPB minibag (has no administration in time range)   metronidazole (FLAGYL) 500 mg in 0.9% NaCl 100 mL IVPB premix (has no administration in time range)   rosuvastatin (CRESTOR) tablet 5 mg (5 mg Oral Given 5/29/22 2310)   insulin glargine (LANTUS) injection vial 22 Units (22 Units SubCUTAneous Given 5/29/22 2314)   gabapentin (NEURONTIN) capsule 300 mg (has no administration in time range)     And   gabapentin (NEURONTIN) capsule 300 mg (has no administration in time range)     And   gabapentin (NEURONTIN) capsule 300 mg (has no administration in time range)     And   gabapentin (NEURONTIN) capsule 900 mg (900 mg Oral Given 5/29/22 2310)   insulin lispro (HUMALOG) injection vial 0-12 Units (has no administration in time range)   insulin lispro (HUMALOG) injection vial 0-6 Units (0 Units SubCUTAneous Not Given 5/29/22 2249)   glucose chewable tablet 16 g (has no administration in time range)   dextrose bolus 10% 125 mL (has no administration in time range)     Or   dextrose bolus 10% 250 mL (has no administration in time range)   glucagon (rDNA) injection 1 mg (has no administration in time range)   dextrose 5 % solution (has no administration in time range)   sodium chloride flush 0.9 % injection 10 mL (10 mLs IntraVENous Not Given 5/29/22 2258)   sodium chloride flush 0.9 % injection 10 mL (has no administration in time range)   0.9 % sodium chloride infusion (has no administration in time range)   ondansetron (ZOFRAN) injection 4 mg (has no administration in time range)   polyethylene glycol (GLYCOLAX) packet 17 g (has no administration in time range)   acetaminophen (TYLENOL) tablet 650 mg (650 mg Oral Given 5/29/22 2310)     Or   acetaminophen (TYLENOL) suppository 650 mg ( Rectal See Alternative 5/29/22 2310)   0.9 % sodium chloride infusion ( IntraVENous New Bag 5/29/22 2313)   0.9 % sodium chloride bolus (0 mLs IntraVENous Stopped 5/29/22 2024)   iopamidol (ISOVUE-370) 76 % injection 75 mL (75 mLs IntraVENous Given 5/29/22 1846)   acetaminophen (TYLENOL) tablet 650 mg (650 mg Oral Given 5/29/22 1933)   0.9 % sodium chloride IV bolus 1,000 mL (0 mLs IntraVENous Stopped 5/29/22 2217)   vancomycin 1000 mg IVPB in 250 mL D5W addavial (0 mg IntraVENous Stopped 5/29/22 2258)   midodrine (PROAMATINE) tablet 10 mg (10 mg Oral Given 5/29/22 2310)   0.9 % sodium chloride bolus (0 mLs IntraVENous Stopped 5/29/22 2326)         Is this patient to be included in the SEP-1 Core Measure due to severe sepsis or septic shock? Yes   SEP-1 CORE MEASURE DATA      Sepsis Criteria   Severe Sepsis Criteria   Septic Shock Criteria     Must be confirmed or suspected to move forward with diagnosis of sepsis. Must meet 2:    [] Temperature > 100.9 F (38.3 C)        or < 96.8 F (36 C)  [x] HR > 90  [] RR > 20  [x] WBC > 12 or < 4 or 10% bands      AND:      [x] Infection Confirmed or        Suspected. Must meet 1:    [x] Lactate > 2       or   [] Signs of Organ Dysfunction:    - SBP < 90 or MAP < 65  - Altered mental status  - Creatinine > 2 or increased from      baseline  - Urine Output < 0.5 ml/kg/hr  - Bilirubin > 2  - INR > 1.5 (not anticoagulated)  - Platelets < 918,758  - Acute Respiratory Failure as     evidenced by new need for NIPPV     or mechanical ventilation      [] No criteria met for Severe Sepsis. Must meet 1:    [] Lactate > 4        or   [] SBP < 90 or MAP < 65 for at        least two readings in the first        hour after fluid bolus        administration      [] Vasopressors initiated (if hypotension persists after fluid resuscitation)        [x] No criteria met for Septic Shock.    Patient Vitals for the past 6 hrs:   BP Pulse Resp SpO2 Height Weight Weight Method Percent Weight Change   05/29/22 1830 (!) 92/49 78 11 99 % -- -- -- --   05/29/22 1844 103/78 75 14 100 % -- -- -- --   05/29/22 1914 (!) 93/42 73 14 99 % -- -- -- --   05/29/22 1937 (!) 91/49 74 17 100 % -- -- -- --   05/29/22 1959 (!) 91/48 73 15 100 % -- -- -- --   05/29/22 2038 (!) 93/49 76 17 100 % -- -- -- --   05/29/22 2200 (!) 89/48 66 12 100 % -- -- -- --   05/29/22 2244 (!) 99/53 68 13 95 % 5' 1\" (1.549 m) 111 lb 8.8 oz (50.6 kg) Actual;Bed scale 0      Recent Labs     05/29/22  1751 05/29/22  2304   WBC 25.2*  --    LACTA  --  1.0   CREATININE 0.8  --    BILITOT 0.4  --    *  --          Time Severe Sepsis Identified: 2000    Fluid Resuscitation Rational: at least 30mL/kg based on entered actual weight at time of triage    Repeat lactate level: ordered and pending at this time    Reassessment Exam:   Not applicable. Patient does not have septic shock. ED COURSE & MEDICAL DECISION MAKING    - The patient presented to the ER with complaints of weakness, syncope, mass in her genital area. Vital signs were reviewed. Exam as above. Peripheral IV placed. Labs, Imaging ordered. - Pertinent Labs & Imaging studies reviewed. (See chart for details)   -  Patient seen and evaluated in the emergency department. -  Triage and nursing notes reviewed and incorporated. -  Old chart records reviewed and incorporated. -   I have seen and evaluated this patient with my supervising physician Kaitlin Root MD.  -  Differential diagnosis includes:  infection/sepsis, medication side effect, intoxication/withdrawal, metabolic/electrolyte abnormalities, malignancy, other  -  Work-up included:  See above  -  ED treatment included:   IV fluids, cefepime, Flagyl, vancomycin, Tylenol  - Consults: GYN -we spoke with Dr. Milka Guadarrama, who was in-house and able to evaluate the patient at the bedside. She placed a call to the GynOnc, Dr Dean Erickson, who will evaluate the patient tomorrow and discuss possible resection or other interventions. We then consulted the hospitalist for admission.  -  Results discussed with patient and/or family. Labs show white blood cell count of 25.2, hemoglobin of 11. Comprehensive metabolic panel with BUN of 25, calcium of 13.9, alk phos of 132, ALT 71, . Troponin is 0.02. Lactic acid is 2.0. A urinalysis shows leukocytes, with 289 white blood cells, 4+ bacteria, mucus, epithelial cells, and red blood cells present. This is a urine sample that is obtained by the patient urinating over a bedpan, as given the mass it is difficult to visualize the anatomy to do a straight cath.  Imaging studies show no acute intracranial abnormalities, no acute abnormalities to the cervical spine. CT of the chest, abdomen, and pelvis shows a heterogenously enhancing mass in the right vulva and perineum that measures up to 14 x 8 x 7 cm with foci of gas within the lesion that is suggestive of ulceration or necrosis (which is evident on physical exam) and bilateral enlarged lymph nodes. .  At this time, we recommend admission, as the patient meets severe sepsis criteria, with a large, fungating, necrotic mass on the vulva that requires further evaluation and management and is concerning for malignancy. The patient and/or family is agreeable with plan of care and disposition.  -  Disposition:   Admission  - Critical Care: The total critical care time I independently spent while evaluating and treating this patient was 31 minutes. This excludes time spent doing separately billable procedures. This includes time at the bedside, data interpretation, medication management, obtaining critical history from collateral sources if the patient is unable to provide it directly, and physician consultation. Specifics of interventions taken and potentially life-threatening diagnostic considerations are listed above in the medical decision making. If this was a shared visit with an physician, the time in this attestation is non-concurrent critical care time out of the total shared critical care time provided by the physician and myself. FINAL IMPRESSION      1. Severe sepsis ([address]    2. Urinary tract infection without hematuria, site unspecified    3. Syncope and collapse    4. Hypercalcemia    5. Vulvar mass    6. Generalized weakness          DISPOSITION/PLAN   DISPOSITION        PATIENT REFERRED TO:  No follow-up provider specified.     DISCHARGE MEDICATIONS:  Current Discharge Medication List          DISCONTINUED MEDICATIONS:  Current Discharge Medication List      STOP taking these medications       insulin lispro, 1 Unit Dial, (HUMALOG KWIKPEN) 100 UNIT/ML SOPN Comments:   Reason for Stopping:                      (Please note that portions of this note were completed with a voice recognition program.  Efforts were made to edit the dictations but occasionally words are mis-transcribed.)    HARPAL Stock (electronically signed)            Samuel Stock  05/30/22 103

## 2022-05-30 NOTE — PROGRESS NOTES
Above note appreciated and daughter's report not surprising. Med Onc and Rad Onc will see the patient tomorrow. Agree with need for palliative care consult.

## 2022-05-30 NOTE — PROGRESS NOTES
Called into [facility] by daughter. She tells me her momwill act here as though she is going to take her meds and participate in treatments but once she gets home she wont. She claims she has \"had something wrong since I was 10 and im 40 now. She has ignored it for years. She throws away her insulin and other meds and never goes to her doctor. im telling you she will say she will do chemo and radiation but once she leaves here she wont do it\" I told her I would have a  follow up with them.  She asked about hospice

## 2022-05-30 NOTE — PROGRESS NOTES
Physical Therapy  Facility/Department: [facility] PROGRESSIVE CARE  Physical Therapy Initial Assessment    Name: Arnol Encarnacion  : 1955  MRN: [id number]  Date of Service: 2022    Discharge Recommendations:  Continue to assess pending progress,Patient would benefit from continued therapy after discharge   PT Equipment Recommendations  Other: will continue to assess; may need her own walker    Arnol Encarnacion scored a 824 on the AM-PAC short mobility form. At this time, anticipate pt will need continued therapy however unable to fully assess this session due to increased pain with movements; will continue to evaluate for discharge disposition. Patient Diagnosis(es): The primary encounter diagnosis was Severe sepsis ([facility] [address] Diagnoses of Urinary tract infection without hematuria, site unspecified, Syncope and collapse, Hypercalcemia, Vulvar mass, and Generalized weakness were also pertinent to this visit. Past Medical History:  has a past medical history of Congenital prolapsed rectum, Displaced bladder, Enlarged, heterogeneous thyroid, Hernia, Mixed hyperlipidemia, Type 2 diabetes mellitus without complication ([facility] [address] and Varicose vein of leg. Past Surgical History:  has no past surgical history on file. Assessment   Body Structures, Functions, Activity Limitations Requiring Skilled Therapeutic Intervention: Decreased functional mobility   Assessment: pt is a 78 yo female who was adm to hosp with 2 to 3-week history of various somatic symptoms which include generalized weakness of her extremities, increasing fatigue, a decreased appetite and a mass in her vulva/perineal area. She reports falling last Friday and hitting her head in her bathroom floor. She did not have loss of consciousness. A CT of her head and a CT of her cervical spine had no abnormal findings.  A CT of her chest, abdomen and pelvis revealed a, \"Large heterogeneously enhancing mass in the right vulva/perineum is concerning for a malignant process. There is also gas within this lesion, suggesting ulceration. \" GYN on-call was consulted who came and evaluated the patient is highly concerned for vulvar cancer. Pt also found to have a UTI and sepsist.  Pt is in increased pain this date therefore deferred OOB; pt will benefit from continued therapy at discharge to address deficits to assist pt in regaining her Ind with functional mobility tasks as she is treated for the mass in her vulva/perineum. Will continue to assess for discharge disposition  Therapy Prognosis: Guarded  Decision Making: Medium Complexity  Barriers to Learning: none  Requires PT Follow-Up: Yes  Activity Tolerance  Activity Tolerance: Patient limited by pain  Activity Tolerance Comments: pt very painful this date with movements of her LEs     Plan   Plan  Plan: 3-5 times per week  Current Treatment Recommendations: Functional mobility training,Endurance training,Transfer training,Balance training,Gait training,Strengthening  Safety Devices  Type of Devices: Call light within reach,Bed alarm in place,Nurse notified,Left in bed     Restrictions  Restrictions/Precautions  Restrictions/Precautions: Fall Risk  Position Activity Restriction  Other position/activity restrictions: difficulty sitting due to Perineal/vulvar mass     Subjective   General  Chart Reviewed: Yes  Additional Pertinent Hx: per Dr Adam Kilgore note: \" Pt is an 77y.o. year-old female with a history of hyperlipidemia and diabetes mellitus. She presents to the emergency room for evaluation following a 2 to 3-week history of various somatic symptoms which include generalized weakness of her extremities, increasing fatigue, a decreased appetite and a mass in her vulva/perineal area. She reports falling last Friday and hitting her head in her bathroom floor. She did not have loss of consciousness. A CT of her head and a CT of her cervical spine had no abnormal findings.   A CT of her chest, abdomen and pelvis revealed a, \"Large heterogeneously enhancing mass in the right vulva/perineum is concerning for a malignant process. There is also gas within this lesion, suggesting ulceration. \"  GYN on-call was consulted who came and evaluated the patient is highly concerned for vulvar cancer. GYN oncology was also consulted who will evaluate the patient in the morning. Further testing revealed a urinary tract infection. Patient meets criteria for severe sepsis. She will be admitted for further evaluation and treatment. \"  Response To Previous Treatment: Not applicable  Family / Caregiver Present: No  Referring Practitioner: Dr Danita Saez  Referral Date : 05/29/22  Follows Commands: Within Functional Limits  Subjective  Subjective: pt limited with activity this date due to increased pain with movement         Social/Functional History  Social/Functional History  Lives With: Spouse  Type of Home: House  Home Layout: One level  Home Access:  (1 small step to enter)  Bathroom Shower/Tub: Tub/Shower unit  Bathroom Toilet: Handicap height  Home Equipment: Tom Carrie, rolling (RW is old and was her mother in laws)  ADL Assistance: [address] [place] [address] Avenue: Independent ( has been helping lately)  Homemaking Responsibilities: Yes  Ambulation Assistance: Independent (recently has been using an old RW due to increased pain and weakness)  Transfer Assistance: Independent  Vision/Hearing  Hearing: Within functional limits (for purpose of eval)    Cognition   Orientation  Overall Orientation Status: Within Functional Limits  Cognition  Overall Cognitive Status: WFL     Objective   Heart Rate: 57  Heart Rate Source: Monitor  BP: (!) 95/48  BP Location: Right upper arm  MAP (Calculated): 63.67  Resp: 17  SpO2: 96 %  O2 Device: None (Room air)              AROM RLE (degrees)  RLE AROM: WFL  RLE General AROM: limited due to pain in her benita area  AROM LLE (degrees)  LLE AROM : WFL  LLE General AROM: limited due to pain in her benita area  Strength RLE  Comment: did not test due to pain but was amb prior to adm  Strength LLE  Comment: did not test due to pain but was amb prior to adm           Bed mobility  Rolling to Left: Maximum assistance  Rolling to Right: Maximum assistance  Scooting: Dependent/Total  Bed Mobility Comments: increased assist due to pain         MD in during session; pt will likely need radiation/chemo to shrink tumor as not operable at this time.               OutComes Score                                                  AM-PAC Score  AM-PAC Inpatient Mobility Raw Score : 8 (05/30/22 1059)  AM-PAC Inpatient T-Scale Score : 28.52 (05/30/22 1059)  Mobility Inpatient CMS 0-100% Score: 86.62 (05/30/22 1059)  Mobility Inpatient CMS G-Code Modifier : CM (05/30/22 1059)          Goals  Long Term Goals  Time Frame for Long term goals : by discharge  Long term goal 1: bed mob min A  Long term goal 2: transfers CGA  Long term goal 3: amb 48' with LRAD CGA  Patient Goals   Patient goals : to get home       Education  Patient Education  Education Given To: Patient  Education Provided: Role of Therapy  Education Method: Verbal  Education Outcome: Verbalized understanding      Therapy Time   Individual Concurrent Group Co-treatment   Time In 1015         Time Out 1059         Minutes 40                 CHELSEY GRIDER PT    Electronically signed by CHELSEY GRIDER PT on 5/30/2022 at 11:02 AM

## 2022-05-30 NOTE — CONSULTS
Clinical Pharmacy Note  Vancomycin Consult    Haider Valenzuela is a 77 y.o. female ordered Vancomycin for UTI; consult received from Dr. Pablo Kuo to manage therapy. Also receiving cefepime and Flagyl. Patient Active Problem List   Diagnosis    DMII (diabetes mellitus, type 2) ([address]    Duodenal ulcer    Pain in both lower extremities    Varicosities of leg    Elevated blood pressure reading    Mixed hyperlipidemia    Type 2 diabetes mellitus with diabetic neuropathy    Acute cystitis with hematuria    Severe sepsis (HCC)    Septic shock (HCC)    Tachycardia    Neutrophilic leukocytosis    Elevated lactic acid level    Elevated liver function tests    Generalized weakness    Fatigue    Vulvar mass    Enlarged, heterogeneous thyroid       Allergies:  Patient has no known allergies. Temp max:  Temp (24hrs), Av.9 °F (36.1 °C), Min:96.8 °F (36 °C), Max:97 °F (36.1 °C)      Recent Labs     22  1751   WBC 25.2*       Recent Labs     22  1751   BUN 25*   CREATININE 0.8         Intake/Output Summary (Last 24 hours) at 2022 0329  Last data filed at 2022 0131  Gross per 24 hour   Intake 1469.08 ml   Output 200 ml   Net 1269.08 ml       Culture Results:  Pending    Ht Readings from Last 1 Encounters:   22 5' 1\" (1.549 m)        Wt Readings from Last 1 Encounters:   22 111 lb 8.8 oz (50.6 kg)         Estimated Creatinine Clearance: 52 mL/min (based on SCr of 0.8 mg/dL). Assessment/Plan:  Vancomycin 1000 mg IV every 18 hours ordered. Exposure target: AUC24 (range)400-600 mg/L.hr   AUC24,ss: 533 mg/L.hr  Probability of AUC24 > 400: 80 %  Ctrough,ss: 15.8 mg/L  Probability of Ctrough,ss > 20: 29 %  Probability of nephrotoxicity (Lodise BENJI ): 11 %    Trough level ordered for tomorrow () morning at 0900 prior to the third dose. Thank you for the consult.      Ester Bull, [facility]  2022 3:30 AM

## 2022-05-30 NOTE — PLAN OF CARE
Fall risk assessment completed every shift. All precautions in place. Pt has call light within reach at all times. Room clear of clutter. Pt aware to call for assistance when getting up. Pain/discomfort being managed with PRN analgesics per MD orders. Pt able to express presence and absence of pain and rate pain appropriately using numerical scale. Skin assessment completed every shift. Pt assessed for incontinence, appropriate barrier cream applied prn. Pt encouraged to turn/rotate every 2 hours. Assistance provided if pt unable to do so themselves. Intake/Output Summary (Last 24 hours) at 5/30/2022 0743  Last data filed at 5/30/2022 0714  Gross per 24 hour   Intake 1944.11 ml   Output 500 ml   Net 1444.11 ml     Vitals:    05/30/22 0700   BP:    Pulse:    Resp:    Temp: 98.1 °F (36.7 °C)   SpO2:      Patient assessed for fall risk; fall precautions initiated. Patient and family instructed about safety devices. Environment kept free of clutter and adequate lighting provided. Bed locked and in lowest position. Call light within reach. Will continue to monitor.

## 2022-05-30 NOTE — PROGRESS NOTES
Medication Reconciliation     List of medications patient is currently taking is complete. Source of information:   1. Conversation with patient at bedside  2. EPIC records        Notes regarding home medications:  1. Patient received some of her home medications today. 2. Verified insulin:  Patient is taking glargine differently, instead of 20 units QD, she is taking 22 units BID  D/C Humalog  Added: Novolog 5-6 units QPM if blood glucose is >139 mg/dL per patient.   3. Added OTC:  -Tylenol   -Homero's restful legs  -Chlorpheniramine    Wesley Freire, Pharmacy Intern  5/29/2022 9:01 PM

## 2022-05-30 NOTE — CONSULTS
Infectious Diseases Inpatient Consult Note      Reason for Consult:   Sepsis, UTI, and large Vulvar lesion     Requesting Physician:  Dr. Morris Aleman     Primary Care Physician:  HERIBERTO Chauhan - CNP    History Obtained From:  [facility] and Patient   CHIEF COMPLAINT:     Chief Complaint   Patient presents with    Extremity Weakness     generalized BLE weakness and \"shakey\" since Thursday, fell in the bathroom hit head on Friday night. no blood thinner and did have a loss of consciousness    Wound Check     large sore around the rectum, with a prolapsed rectum does not have a provider taking careof that.  Fatigue     over the past two to three weeks, has noticed a decreased to no appetite, losing weight of 28lbs         HISTORY OF PRESENT ILLNESS:  77 y.o. Woman admitted with large fungating vulvar mass with a concern for malignancy, she has on going drainage with odor, on gong for months and pt was fearful not to seek medical attention, WBC at  25.2 , lactic acid 2, Calcium 13.9, UA very abnormal and urine cx  E coli noted and CT abd/pelvis with large fungating mass from the vulvar area- seen by Gynecology we are consulted for IV abx recommendations. Location :  Vulvar area pain, pelvic pain     Quality :aching         Severity :10/10     Duration :  Weeks      Timing : constant   Context : mass in the vulvar area     Modifying factors : none    Associated signs and symptoms:  No fevers no chills,         Past Medical History:    Past Medical History:   Diagnosis Date    Congenital prolapsed rectum     Displaced bladder     Enlarged, heterogeneous thyroid 5/29/2022    Hernia     Mixed hyperlipidemia 10/14/2019    Type 2 diabetes mellitus without complication ([facility] [address] 0/74/8728    Varicose vein of leg        Past Surgical History:    History reviewed. No pertinent surgical history.     Current Medications:    Outpatient Medications Marked as Taking for the 5/29/22 encounter [facility] Encounter) Medication Sig Dispense Refill    insulin aspart (NOVOLOG) 100 UNIT/ML injection vial Inject 5-6 Units into the skin daily      acetaminophen (TYLENOL) 325 mg tablet Take 650 mg by mouth every 6 hours as needed for Pain      chlorpheniramine (SB CHLORPHENIRAMINE) 4 MG tablet Take 4 mg by mouth every 6 hours as needed for Allergies      Homeopathic Products (RESTFUL LEGS SL) Place 1 tablet under the tongue 4 times daily         Allergies:  Patient has no known allergies.     Immunizations :   Immunization History   Administered Date(s) Administered    COVID-19, Pfizer Purple top, DILUTE for use, 12+ yrs, 30mcg/0.3mL dose 03/11/2021, 04/01/2021, 10/22/2021    Pneumococcal Polysaccharide ([id number]) 10/20/2020, 10/20/2020         Social History:     Social History     Tobacco Use    Smoking status: Never Smoker    Smokeless tobacco: Never Used   Substance Use Topics    Alcohol use: No     Alcohol/week: 0.0 standard drinks    Drug use: No     Social History     Tobacco Use   Smoking Status Never Smoker   Smokeless Tobacco Never Used      Family History   Problem Relation Age of Onset    High Blood Pressure Mother     Hypertension Mother     High Cholesterol Mother     Macular Degen Mother     Thyroid Disease Mother     Cancer Father     Alcohol Abuse Father     High Blood Pressure Sister     High Cholesterol Sister     Thyroid Disease Sister     Heart Disease Brother     Alcohol Abuse Brother          REVIEW OF SYSTEMS:      Constitutional:  negative for fevers, chills, night sweats  Eyes:  negative for blurred vision, eye discharge, visual disturbance   HEENT:  negative for hearing loss, ear drainage,nasal congestion  Respiratory:  negative for cough, shortness of breath or hemoptysis   Cardiovascular:  negative for chest pain, palpitations, syncope  Gastrointestinal:  negative for nausea, vomiting, diarrhea, constipation, abdominal pain+ vulvar pain ++  Genitourinary:  negative for frequency, dysuria, urinary incontinence+ , hematuria  Hematologic/Lymphatic:  negative for easy bruising, bleeding and lymphadenopathy  Allergic/Immunologic:  negative for recurrent infections, angioedema, anaphylaxis   Endocrine:  negative for weight changes, polyuria, polydipsia and polyphagia  Musculoskeletal:  negative for joint  pain, swelling, decreased range of motion  Integumentary: No rashes, skin lesions  Neurological:  negative for headaches, slurred speech, unilateral weakness  Psychiatric: negative for hallucinations,confusion,agitation.      PHYSICAL EXAM:      Vitals:    BP (!) 102/57   Pulse 71   Temp 98.2 °F (36.8 °C) (Oral)   Resp 16   Ht 5' 1\" (1.549 m)   Wt 118 lb 9.7 oz (53.8 kg)   SpO2 92%   BMI 22.41 kg/m²     General Appearance: alert,in some acute distress, ++  pallor, no icterus   Skin: warm and dry, no rash or erythema  Head: normocephalic and atraumatic  Eyes: pupils equal, round, and reactive to light, conjunctivae normal  ENT: tympanic membrane, external ear and ear canal normal bilaterally, nose without deformity, nasal mucosa and turbinates normal without polyps  Neck: supple and non-tender without mass, no thyromegaly  no cervical lymphadenopathy  Pulmonary/Chest: clear to auscultation bilaterally- no wheezes, rales or rhonchi, normal air movement, no respiratory distress  Cardiovascular: normal rate, regular rhythm, normal S1 and S2, no murmurs, rubs, clicks, or gallops, no carotid bruits  Abdomen: soft, non-tender, non-distended, normal bowel sounds, no masses or organomegaly  Extremities: no cyanosis, clubbing or edema  Musculoskeletal: normal range of motion, no joint swelling, deformity or tenderness  Integumentary: No rashes, no abnormal skin lesions, no petechiae  Neurologic: reflexes normal and symmetric, no cranial nerve deficit  Psych:  Orientation, sensorium, mood normal   Lines: IV  Vulvar lesion local redness, inguinal adenopathy +     DATA:    CBC:   Lab Results   Component Value Date    WBC 21.8 (H) 05/31/2022    HGB 8.5 (L) 05/31/2022    HCT 27.6 (L) 05/31/2022    MCV 82.7 05/31/2022     05/31/2022     RENAL:   Lab Results   Component Value Date    CREATININE 0.5 (L) 05/31/2022    BUN 11 05/31/2022     05/31/2022    K 3.1 (L) 05/31/2022     05/31/2022    CO2 25 05/31/2022     SED RATE: No results found for: SEDRATE  CK: No results found for: CKTOTAL  CRP: No results found for: CRP  Hepatic Function Panel:   Lab Results   Component Value Date    ALKPHOS 80 05/31/2022    ALT 42 05/31/2022    AST 39 05/31/2022    PROT 4.8 05/31/2022    BILITOT <0.2 05/31/2022    LABALBU 2.1 05/31/2022     UA:  Lab Results   Component Value Date    COLORU Yellow 05/29/2022    CLARITYU TURBID 05/29/2022    GLUCOSEU Negative 05/29/2022    BILIRUBINUR Negative 05/29/2022    BILIRUBINUR NEG 10/20/2020    KETUA 15 05/29/2022    SPECGRAV 1.026 05/29/2022    BLOODU LARGE 05/29/2022    PHUR 5.0 05/29/2022    PROTEINU 30 05/29/2022    UROBILINOGEN 0.2 05/29/2022    NITRU Negative 05/29/2022    LEUKOCYTESUR MODERATE 05/29/2022    LABMICR YES 05/29/2022    URINETYPE NotGiven 05/29/2022      Urine Microscopic:   Lab Results   Component Value Date    BACTERIA 4+ 05/29/2022    COMU see below 05/29/2022    HYALCAST 17 05/29/2022    WBCUA 289 05/29/2022    RBCUA 169 05/29/2022    EPIU 14 05/29/2022     Urine Reflex to Culture:   Lab Results   Component Value Date    URRFLXCULT Yes 05/29/2022         MICRO: cultures reviewed and updated by me       Blood Culture:   Lab Results   Component Value Date    [facility]  05/29/2022     No Growth to date. Any change in status will be called. BLOODCULT2  05/29/2022     No Growth to date. Any change in status will be called.        Viral Culture:    No results found for: COVID19  Urine Culture:   Recent Labs     05/29/22  1821   LABURIN Further report to follow       Scheduled Meds:   ferrous sulfate  324 mg Oral Every Other Day    vancomycin  1,000 mg IntraVENous Q12H    zoledronic acid (ZOMETA) IVPB  4 mg IntraVENous Once    insulin glargine  18 Units SubCUTAneous Nightly    vancomycin (VANCOCIN) intermittent dosing (placeholder)   Other RX Placeholder    cefepime  2,000 mg IntraVENous Q12H    metroNIDAZOLE  500 mg IntraVENous Q8H    rosuvastatin  5 mg Oral Nightly    gabapentin  300 mg Oral QAM    And    gabapentin  300 mg Oral Lunch    And    gabapentin  300 mg Oral QPM    And    gabapentin  900 mg Oral Nightly    insulin lispro  0-12 Units SubCUTAneous TID WC    insulin lispro  0-6 Units SubCUTAneous Nightly    sodium chloride flush  10 mL IntraVENous 2 times per day       Continuous Infusions:   dextrose      sodium chloride      sodium chloride 100 mL/hr at 05/31/22 0214       PRN Meds:  traMADol, oxyCODONE, glucose, dextrose bolus **OR** dextrose bolus, glucagon (rDNA), dextrose, sodium chloride flush, sodium chloride, ondansetron, polyethylene glycol, acetaminophen **OR** acetaminophen    Imaging:   CT CHEST ABDOMEN PELVIS W CONTRAST   Final Result   1. No acute intrathoracic abnormality. 2. Large heterogeneously enhancing mass in the right vulva/perineum is   concerning for a malignant process. There is also gas within this lesion,   suggesting ulceration. Clinical correlation and tissue sampling are   recommended for further evaluation. 3. Bilateral inguinal lymphadenopathy. 4. Enlarged, heterogeneous thyroid. Ultrasound is recommended. RECOMMENDATIONS:   Incidental heterogeneous and enlarged thyroid. Recommend thyroid US. Reference: J Am Riana Radiol. 2015 Feb;12(2): 143-50         CT Cervical Spine WO Contrast   Final Result   1. No acute abnormality of the cervical spine. 2. Multilevel degenerative changes. RECOMMENDATIONS:   1 cm incidental right thyroid nodule. No follow-up imaging is recommended. Reference: J Am Riana Radiol.  2015 Feb;12(2): 143-50         CT Head WO Contrast   Final Result   No acute intracranial abnormality. XR CHEST PORTABLE   Final Result   No acute cardiopulmonary abnormality. All pertinent images and reports for the current Hospitalization were reviewed by me. IMPRESSION:    Patient Active Problem List   Diagnosis    DMII (diabetes mellitus, type 2) ([address]    Duodenal ulcer    Pain in both lower extremities    Varicosities of leg    Elevated blood pressure reading    Mixed hyperlipidemia    Type 2 diabetes mellitus with diabetic neuropathy    Acute cystitis with hematuria    Severe sepsis (HCC)    Septic shock (HCC)    Tachycardia    Neutrophilic leukocytosis    Elevated lactic acid level    Elevated liver function tests    Generalized weakness    Fatigue    Vulvar mass    Enlarged, heterogeneous thyroid     Sepsis  WBC elevation   Lactic acidosis  UTI  E coli on urine cx  Vulvar mass fungating  Concern for advanced Cancer  Inguinal adenopathy  Lactic acidosis  Hyper calcemia  Wt loss   DM+    Unfortunately progression of the mass with possible secondary infection and UTI - WILL nee biopsy to establish the diagnosis and will cont IV abx for UTI and secondary bacterial infection         Labs, Microbiology, Radiology and pertinent results from current hospitalization and care every where were reviewed by me as a part of the consultation. PLAN :  1. Cont IV Cefepime x 2 gm Q 12 hrs  2. Cont IV Flagyl   3. D/c IV Vancomycin   4. Trend WBC   5. Correct Hypercalcemia  6. Home going on oral abx once E coli sensitive    Discussed with patient/Family and Nursing   Risk of Complications/Morbidity: High      · Illness(es)/ Infection present that pose threat to bodily function. · There is potential for severe exacerbation of infection/side effects of treatment. · Therapy requires intensive monitoring for antimicrobial agent toxicity. Thanks for allowing me to participate in your patient's care please call me with any questions or concerns.     Dr. Shay Ray MD  Infectious Disease  [facility]) Physician  Phone: [phone]   Fax : [phone]

## 2022-05-30 NOTE — ED PROVIDER NOTES
I have personally performed a face to face diagnostic evaluation on this patient. I have fully participated in the care of this patient I personally saw the patient and performed a substantive portion of the visit including all aspects of the medical decision making. I have reviewed and agree with all pertinent clinical information including history, physical exam, diagnostic tests, and the plan. HPI: Luis Fernando Gagnon presented with generalized lower extremity weakness and generalized weakness after a fall. Generalized fatigue has been getting worse for months. Decreased appetite losing a lot of weight. Denies any fevers. Of note patient also has known rectal prolapse and has been having large sores around the rectum vulva and perineum that is been getting worse for many years and has never seen a physician about this. Chief Complaint   Patient presents with    Extremity Weakness     generalized BLE weakness and \"shakey\" since Thursday, fell in the bathroom hit head on Friday night. no blood thinner and did have a loss of consciousness    Wound Check     large sore around the rectum, with a prolapsed rectum does not have a provider taking careof that.       Fatigue     over the past two to three weeks, has noticed a decreased to no appetite, losing weight of 28lbs     Review of Systems: See YELITZA note  Vital Signs: BP (!) 93/49   Pulse 76   Temp 96.9 °F (36.1 °C)   Resp 17   Ht 5' 1\" (1.549 m)   Wt 128 lb (58.1 kg)   SpO2 100%   BMI 24.19 kg/m²     Alert 77 y.o. female who does not appear toxic or acutely ill  HENT: Atraumatic, oral mucosa moist  Neck: Grossly normal ROM  Chest/Lungs: respiratory effort normal   Abdomen: Soft nontender  : Extensive fungating vulval mass with necrosis erythema and distortion of normal anatomy  Extremities: Soft nontender, 2+ radial bilaterally  Musculoskeletal: Grossly normal ROM  Skin: Generalized pallor no diaphoresis    Medical Decision Making and Plan:  Pertinent Labs & Imaging studies reviewed. (See YELITZA chart for details)  I agree with Alla and plan. Patient found to have UTI and concern for sepsis with elevated white blood cell count and mild hypotension. Patient is afebrile not tachycardic saturating well on room air.  exam is very impressive for fungating possibly infected and/or cancerous vulval mass. GYN on-call was consulted who came and evaluated the patient is highly concerned for vulvar cancer. GYN oncology was also consulted who will evaluate the patient in the morning. Patient be admitted given broad-spectrum antibiotics. See YELITZA sepsis note for further details.     EKG Interpretation    Interpreted by emergency department physician    Rhythm: normal sinus   Rate: normal  Axis: normal  Ectopy: none  Conduction: normal  ST Segments: normal  T Waves: normal  Q Waves: none    Clinical Impression: Normal sinus rhythm, normal EKG             Angelica Wilson MD  05/29/22 Delia Fields MD  05/29/22 9919

## 2022-05-30 NOTE — PLAN OF CARE
Problem: Discharge Planning  Goal: Discharge to home or other facility with appropriate resources  Outcome: Progressing  Flowsheets  Taken 5/29/2022 2320  Discharge to home or other facility with appropriate resources: Identify barriers to discharge with patient and caregiver  Taken 5/29/2022 2242  Discharge to home or other facility with appropriate resources: Identify barriers to discharge with patient and caregiver     Problem: Pain  Goal: Verbalizes/displays adequate comfort level or baseline comfort level  Outcome: Progressing     Problem: Skin/Tissue Integrity  Goal: Absence of new skin breakdown  Description: 1. Monitor for areas of redness and/or skin breakdown  2. Assess vascular access sites hourly  3. Every 4-6 hours minimum:  Change oxygen saturation probe site  4. Every 4-6 hours:  If on nasal continuous positive airway pressure, respiratory therapy assess nares and determine need for appliance change or resting period.   Outcome: Progressing     Problem: Safety - Adult  Goal: Free from fall injury  Outcome: Progressing

## 2022-05-30 NOTE — CONSULTS
Clinical Pharmacy Note  Vancomycin Consult    Pharmacy consult received for one-time dose of vancomycin in the Emergency Department per Marian Seen. Ht Readings from Last 1 Encounters:   05/29/22 5' 1\" (1.549 m)        Wt Readings from Last 1 Encounters:   05/29/22 128 lb (58.1 kg)         Assessment/Plan:   Vancomycin 1000 mg x 1 in ED.  If Vancomycin is to continue on admission and pharmacy is to manage dosing, please re-consult with admission orders.

## 2022-05-30 NOTE — PROGRESS NOTES
4 Eyes Skin Assessment     NAME:  Shahbaz Coto  YOB: 1955  MEDICAL RECORD NUMBER:  [id number]    The patient is being assess for  Admission    I agree that 2 RN's have performed a thorough Head to Toe Skin Assessment on the patient. ALL assessment sites listed below have been assessed. Areas assessed by both nurses:    Head, Face, Ears, Shoulders, Back, Chest, Arms, Elbows, Hands, Sacrum. Buttock, Coccyx, Ischium and Legs. Feet and Heels        Does the Patient have a Wound?  Other vuvlar mass       Eric Prevention initiated:  Yes   Wound Care Orders initiated:  No    Pressure Injury (Stage 3,4, Unstageable, DTI, NWPT, and Complex wounds) if present place consult order under [de-identified] No    New and Established Ostomies if present place consult order under : No      Nurse 1 eSignature: Electronically signed by Dennis Flowers RN on 5/30/22 at 12:45 AM EDT    **SHARE this note so that the co-signing nurse is able to place an eSignature**    Nurse 2 eSignature: Electronically signed by Shady Mendoza RN on 5/30/22 at 12:46 AM EDT

## 2022-05-30 NOTE — PROGRESS NOTES
MD notified of pt's K+ level of 3.  Electronically signed by Tanya Sanchez RN on 5/30/2022 at 4:46 AM

## 2022-05-30 NOTE — PROGRESS NOTES
Hospitalist Progress Note      PCP: Leti Adams, APRN - CNP    Date of Admission: 5/29/2022      Subjective: Tired, denies fever or chills at this time stated that she lost at least 27 pounds recently. Medications:  Reviewed    Infusion Medications    dextrose      sodium chloride      sodium chloride 100 mL/hr at 05/30/22 6633     Scheduled Medications    vancomycin (VANCOCIN) intermittent dosing (placeholder)   Other RX Placeholder    vancomycin  1,000 mg IntraVENous Q18H    cefepime  2,000 mg IntraVENous Q12H    metroNIDAZOLE  500 mg IntraVENous Q8H    rosuvastatin  5 mg Oral Nightly    insulin glargine  22 Units SubCUTAneous BID    gabapentin  300 mg Oral QAM    And    gabapentin  300 mg Oral Lunch    And    gabapentin  300 mg Oral QPM    And    gabapentin  900 mg Oral Nightly    insulin lispro  0-12 Units SubCUTAneous TID WC    insulin lispro  0-6 Units SubCUTAneous Nightly    sodium chloride flush  10 mL IntraVENous 2 times per day     PRN Meds: glucose, dextrose bolus **OR** dextrose bolus, glucagon (rDNA), dextrose, sodium chloride flush, sodium chloride, ondansetron, polyethylene glycol, acetaminophen **OR** acetaminophen      Intake/Output Summary (Last 24 hours) at 5/30/2022 0758  Last data filed at 5/30/2022 0714  Gross per 24 hour   Intake 1944.11 ml   Output 500 ml   Net 1444.11 ml       Physical Exam Performed:    BP (!) 95/48   Pulse 57   Temp 98.1 °F (36.7 °C) (Oral)   Resp 17   Ht 5' 1\" (1.549 m)   Wt 111 lb 8.8 oz (50.6 kg)   SpO2 96%   BMI 21.08 kg/m²     General appearance: No apparent distress  Neck: Supple  Respiratory:  Normal respiratory effort. Clear to auscultation, bilaterally without Rales/Wheezes/Rhonchi. Cardiovascular: Regular rate and rhythm with normal S1/S2 without murmurs, rubs or gallops. Abdomen: Soft, non-tender  Musculoskeletal: No clubbing, cyanosis   Skin: Skin color, texture, turgor normal.  No rashes or lesions.   Neurologic: Being all her extremities  Psychiatric: Alert and oriented  Capillary Refill: Brisk,3 seconds, normal   Peripheral Pulses: +2 palpable, equal bilaterally       Labs:   Recent Labs     05/29/22  1751 05/30/22  0351   WBC 25.2* 24.0*   HGB 11.0* 8.7*   HCT 36.3 28.6*   * 381     Recent Labs     05/29/22  1751 05/30/22  0351    141   K 4.1 3.0*    107   CO2 26 26   BUN 25* 18   CREATININE 0.8 0.5*   CALCIUM 13.9* 10.7*     Recent Labs     05/29/22  1751 05/30/22  0351   * 72*   ALT 71* 53*   BILITOT 0.4 <0.2   ALKPHOS 132* 84     No results for input(s): INR in the last 72 hours. Recent Labs     05/29/22  1751 05/29/22  2304 05/30/22  0351   TROPONINI 0.02* <0.01 0.01       Urinalysis:      Lab Results   Component Value Date    NITRU Negative 05/29/2022    WBCUA 289 05/29/2022    BACTERIA 4+ 05/29/2022    RBCUA 169 05/29/2022    BLOODU LARGE 05/29/2022    SPECGRAV 1.026 05/29/2022    GLUCOSEU Negative 05/29/2022       Radiology:  CT CHEST ABDOMEN PELVIS W CONTRAST   Final Result   1. No acute intrathoracic abnormality. 2. Large heterogeneously enhancing mass in the right vulva/perineum is   concerning for a malignant process. There is also gas within this lesion,   suggesting ulceration. Clinical correlation and tissue sampling are   recommended for further evaluation. 3. Bilateral inguinal lymphadenopathy. 4. Enlarged, heterogeneous thyroid. Ultrasound is recommended. RECOMMENDATIONS:   Incidental heterogeneous and enlarged thyroid. Recommend thyroid US. Reference: J Am Riana Radiol. 2015 Feb;12(2): 143-50         CT Cervical Spine WO Contrast   Final Result   1. No acute abnormality of the cervical spine. 2. Multilevel degenerative changes. RECOMMENDATIONS:   1 cm incidental right thyroid nodule. No follow-up imaging is recommended. Reference: J Am Riana Radiol. 2015 Feb;12(2): 143-50         CT Head WO Contrast   Final Result   No acute intracranial abnormality. XR CHEST PORTABLE   Final Result   No acute cardiopulmonary abnormality. Assessment/Plan:    Active Hospital Problems    Diagnosis     Acute cystitis with hematuria [N30.01]      Priority: Medium    Severe sepsis ([address] [A41.9, R65.20]      Priority: Medium    Septic shock ([address] [A41.9, R65.21]      Priority: Medium    Tachycardia [R00.0]      Priority: Medium    Neutrophilic leukocytosis [O54.0]      Priority: Medium    Elevated lactic acid level [R79.89]      Priority: Medium    Elevated liver function tests [R79.89]      Priority: Medium    Generalized weakness [R53.1]      Priority: Medium    Fatigue [R53.83]      Priority: Medium    Vulvar mass [N90.89]      Priority: Medium    Enlarged, heterogeneous thyroid [E04.9]      Priority: Medium    Mixed hyperlipidemia [E78.2]     DMII (diabetes mellitus, type 2) ([facility] [E11.9]        1.  UTI, patient was started on cefepime will keep for now. Also vancomycin and Flagyl added empirically. 2.  Perineal/vulvar mass, gynecology consulted, history of venous, cannot completely exclude infection or malignancy, patient already on vancomycin Flagyl and cefepime. Gynecology consulted as noted above pending further recommendations  3. Sepsis, severe, IV fluid given, IV fluid maintenance. Monitor closely  4. Elevated LFTs, monitor for now  5. Generalized weakness, due to above, PT OT  6. Large thyroid, heterogeneous, possible nodules, will need an ultrasound at one-point. 7.  Diabetes mellitus, sliding scale  8. Elevated lactic acid on admission, improved  9. Hypercalcemia on admission, received IV fluid, improving, suspecting malignant etiology. 10.  Hypokalemia, will replace  11. Anemia, no obvious bleeding, suspecting due to vulvar mass which is highly suspicious for malignancy along with necrotic tissue. Diet: ADULT DIET;  Regular; 5 carb choices (75 gm/meal)  Code Status: Full Code      Kati Uriostegui MD

## 2022-05-30 NOTE — FLOWSHEET NOTE
offered emotional support and spiritual care to patient. Patient's  and daughter are visiting and patient indicated she had no needs at this time.

## 2022-05-30 NOTE — H&P
Hospital Medicine  History and Physical    PCP: Daryle Perone, APRN - CNP  Patient Name: Ashley Fletcher    Date of Service: Pt seen/examined on 5/29/22 and admitted to Inpatient with expected LOS greater than two midnights due to medical therapy    CHIEF COMPLAINT:  Pt c/o generalized weakness, fatigue  HISTORY OF PRESENT ILLNESS: Pt is an 77y.o. year-old female with a history of hyperlipidemia and diabetes mellitus. She presents to the emergency room for evaluation following a 2 to 3-week history of various somatic symptoms which include generalized weakness of her extremities, increasing fatigue, a decreased appetite and a mass in her vulva/perineal area. She reports falling last Friday and hitting her head in her bathroom floor. She did not have loss of consciousness. A CT of her head and a CT of her cervical spine had no abnormal findings. A CT of her chest, abdomen and pelvis revealed a, \"Large heterogeneously enhancing mass in the right vulva/perineum is concerning for a malignant process. There is also gas within this lesion, suggesting ulceration. \"  GYN on-call was consulted who came and evaluated the patient is highly concerned for vulvar cancer. GYN oncology was also consulted who will evaluate the patient in the morning. Further testing revealed a urinary tract infection. Patient meets criteria for severe sepsis. She will be admitted for further evaluation and treatment. Associated signs and symptoms do not include fever or chills, hemoptysis, hematochezia, diarrhea, constipation or urinary symptoms. Past Medical History:        Diagnosis Date    Congenital prolapsed rectum     Displaced bladder     Hernia     Mixed hyperlipidemia 10/14/2019    Type 2 diabetes mellitus without complication ([facility] [address] 0/14/0168    Varicose vein of leg        Past Surgical History:    History reviewed. No pertinent surgical history. Allergies:  Patient has no known allergies.     Medications Prior to Admission:    Prior to Admission medications    Medication Sig Start Date End Date Taking? Authorizing Provider   insulin aspart (NOVOLOG) 100 UNIT/ML injection vial Inject 5-6 Units into the skin daily   Yes Historical Provider, MD   acetaminophen (TYLENOL) 325 mg tablet Take 650 mg by mouth every 6 hours as needed for Pain   Yes Historical Provider, MD   chlorpheniramine (SB CHLORPHENIRAMINE) 4 MG tablet Take 4 mg by mouth every 6 hours as needed for Allergies   Yes Historical Provider, MD   Homeopathic Products (RESTFUL LEGS SL) Place 1 tablet under the tongue 4 times daily   Yes Historical Provider, MD   gabapentin (NEURONTIN) 300 MG capsule FILL 11/3 1 tab in am 1 tab afternoon 1 tab at dinner 3 tabs at bedtime 4/18/22 10/1/22  HERIBERTO Palacios CNP   insulin glargine (LANTUS;BASAGLAR) 100 UNIT/ML injection pen Inject 20 Units into the skin nightly  Patient taking differently: Inject 22 Units into the skin 2 times daily  4/18/22   HERIBERTO Palacios CNP   rosuvastatin (CRESTOR) 5 MG tablet TAKE ONE TABLET BY MOUTH ONCE NIGHTLY 12/17/21   HERIBERTO Ro CNP       Family History:       Problem Relation Age of Onset    High Blood Pressure Mother     Hypertension Mother     High Cholesterol Mother     Macular Degen Mother     Thyroid Disease Mother     Cancer Father     Alcohol Abuse Father     High Blood Pressure Sister     High Cholesterol Sister     Thyroid Disease Sister     Heart Disease Brother     Alcohol Abuse Brother      Social History:   TOBACCO:   reports that she has never smoked. She has never used smokeless tobacco.  ETOH:   reports no history of alcohol use.   OCCUPATION:      REVIEW OF SYSTEMS:  A full review of systems was performed and is negative except for that which appears in the HPI    Physical Exam:    Vitals: BP (!) 89/48   Pulse 66   Temp 96.9 °F (36.1 °C)   Resp 12   Ht 5' 1\" (1.549 m)   Wt 128 lb (58.1 kg)   SpO2 100% BMI 24.19 kg/m²   General appearance: Frail, elderly appearing 77y.o. year-old female who is alert, appears stated age and is cooperative  HEENT: Head: Normocephalic, no lesions, without obvious abnormality. Eye: Normal external eye, conjunctiva, lids cornea, PEERL. Ears: Normal external ears. Non-tender. Nose: Normal external nose, mucus membranes and septum. Pharynx: Dental Hygiene adequate. Normal buccal mucosa. Normal pharynx. Neck: no adenopathy, no carotid bruit, no JVD, supple, symmetrical, trachea midline and thyroid not enlarged, symmetric, no tenderness/mass/nodules  Lungs: clear to auscultation bilaterally and no use of accessory muscles  Heart: regular rate and rhythm, S1, S2 normal, no murmur, click, rub or gallop and normal apical impulse  Abdomen: soft, non-tender; bowel sounds normal; no masses, no organomegaly  Extremities: extremities atraumatic, no cyanosis or edema and Homans sign is negative, no sign of DVT. Capillary Refill: Acceptable < 3 seconds   Peripheral Pulses: +3 easily felt, not easily obliterated with pressures   Skin: Skin color, texture, turgor normal. No rashes or lesions on exposed skin  Neurologic: Neurovascularly intact without any focal sensory/motor deficits. Cranial nerves: II-XII intact, grossly non-focal. Gait was not tested.   Mental Status: Alert and oriented, thought content appropriate, normal insight        CBC:   Recent Labs     05/29/22  1751   WBC 25.2*   HGB 11.0*   *     BMP:    Recent Labs     05/29/22  1751      K 4.1      CO2 26   BUN 25*   CREATININE 0.8   GLUCOSE 106*     Troponin:   Recent Labs     05/29/22  1751   TROPONINI 0.02*     PT/INR:  No results found for: PTINR  U/A:    Lab Results   Component Value Date    LEUKOCYTESUR MODERATE 05/29/2022    NITRITE NEG 10/20/2020    RBCUA 169 05/29/2022    SPECGRAV 1.026 05/29/2022    UROBILINOGEN 0.2 05/29/2022    BILIRUBINUR Negative 05/29/2022    BILIRUBINUR NEG 10/20/2020    BLOODU LARGE 05/29/2022    GLUCOSEU Negative 05/29/2022    PROTEINU 30 05/29/2022         RAD:   I have independently reviewed and interpreted the imaging studies below and based my recommendations to the patient on those findings. CT Head WO Contrast    Result Date: 5/29/2022  EXAMINATION: CT OF THE HEAD WITHOUT CONTRAST  5/29/2022 5:38 pm TECHNIQUE: CT of the head was performed without the administration of intravenous contrast. Automated exposure control, iterative reconstruction, and/or weight based adjustment of the mA/kV was utilized to reduce the radiation dose to as low as reasonably achievable. COMPARISON: None. HISTORY: ORDERING SYSTEM PROVIDED HISTORY: Syncope, frequent falls TECHNOLOGIST PROVIDED HISTORY: Reason for exam:->Syncope, frequent falls Has a \"code stroke\" or \"stroke alert\" been called? ->No Decision Support Exception - unselect if not a suspected or confirmed emergency medical condition->Emergency Medical Condition (MA) FINDINGS: BRAIN/VENTRICLES: There is no acute intracranial hemorrhage, mass effect or midline shift. No abnormal extra-axial fluid collection. The gray-white differentiation is maintained without evidence of an acute infarct. There is no evidence of hydrocephalus. ORBITS: The visualized portion of the orbits demonstrate no acute abnormality. SINUSES: The visualized paranasal sinuses and mastoid air cells demonstrate no acute abnormality. SOFT TISSUES/SKULL:  No acute abnormality of the visualized skull or soft tissues. No acute intracranial abnormality. CT Cervical Spine WO Contrast    Result Date: 5/29/2022  EXAMINATION: CT OF THE CERVICAL SPINE WITHOUT CONTRAST 5/29/2022 5:39 pm TECHNIQUE: CT of the cervical spine was performed without the administration of intravenous contrast. Multiplanar reformatted images are provided for review.  Automated exposure control, iterative reconstruction, and/or weight based adjustment of the mA/kV was utilized to reduce the radiation dose to as low as reasonably achievable. COMPARISON: None. HISTORY: ORDERING SYSTEM PROVIDED HISTORY: Syncope TECHNOLOGIST PROVIDED HISTORY: Reason for exam:->Syncope Decision Support Exception - unselect if not a suspected or confirmed emergency medical condition->Emergency Medical Condition (MA) FINDINGS: BONES/ALIGNMENT: There is no acute fracture or traumatic malalignment. DEGENERATIVE CHANGES: Mild, multilevel degenerative changes. SOFT TISSUES: There is no prevertebral soft tissue swelling. 1 cm right thyroid nodule. 1. No acute abnormality of the cervical spine. 2. Multilevel degenerative changes. RECOMMENDATIONS: 1 cm incidental right thyroid nodule. No follow-up imaging is recommended. Reference: J Am Riana Radiol. 2015 Feb;12(2): 143-50     XR CHEST PORTABLE    Result Date: 5/29/2022  EXAMINATION: ONE XRAY VIEW OF THE CHEST 5/29/2022 5:28 pm COMPARISON: 11/19/2015 HISTORY: ORDERING SYSTEM PROVIDED HISTORY: Syncope TECHNOLOGIST PROVIDED HISTORY: Reason for exam:->Syncope Reason for Exam: Extremity Weakness; Wound Check; Fatigue FINDINGS: Cardiomediastinal silhouette is normal in size. There is no pleural effusion or pneumothorax. The lungs are hyperinflated. No pulmonary consolidation. There is no acute osseous abnormality. No acute cardiopulmonary abnormality. CT CHEST ABDOMEN PELVIS W CONTRAST    Result Date: 5/29/2022  EXAMINATION: CT OF THE CHEST, ABDOMEN, AND PELVIS WITH CONTRAST 5/29/2022 6:41 pm TECHNIQUE: CT of the chest, abdomen and pelvis was performed with the administration of intravenous contrast. Multiplanar reformatted images are provided for review. Automated exposure control, iterative reconstruction, and/or weight based adjustment of the mA/kV was utilized to reduce the radiation dose to as low as reasonably achievable.  COMPARISON: 11/20/2015 HISTORY: ORDERING SYSTEM PROVIDED HISTORY: Large, necrotic appearing mass/swelling to vulva/rectal region; pt states has been present for \"years\" TECHNOLOGIST PROVIDED HISTORY: Reason for exam:->Large, necrotic appearing mass/swelling to vulva/rectal region; pt states has been present for \"years\" Additional Contrast?->None FINDINGS: Chest: Mediastinum: Thyroid is enlarged and heterogeneous. Heart size is normal without pericardial effusion. Thoracic aorta and main pulmonary artery are normal in caliber. Coronary artery atherosclerosis. No mediastinal lymphadenopathy. Lungs/pleura: There is no pleural effusion. There is no pneumothorax. There is no pulmonary consolidation. Soft Tissues/Bones: Multilevel degenerative changes of the thoracic spine. Abdomen/Pelvis: Organs: No acute abnormality within the liver, spleen, gallbladder, pancreas, or adrenal glands. No nephrolithiasis or hydronephrosis. GI/Bowel: Stomach is nondistended. The small bowel is nondilated. The colon is nondilated. Pelvis: Bladder is partially distended without vesicular stone. Uterus is present. Endometrial lesions are partially visualized, most consistent with fibroids. Peritoneum/Retroperitoneum: No ascites or pneumoperitoneum. Atherosclerosis of the nondilated abdominal aorta. There is a fat containing ventral abdominal wall hernia. Bones/Soft Tissues: Multilevel degenerative changes of the lumbar spine. There is a heterogeneously enhancing mass in the right vulva/perineum measuring up to 14.1 x 8.8 x 7.5 cm. There are foci of gas within this lesion, suggesting ulceration/necrosis. There are mildly enlarged bilateral inguinal lymph nodes. 1. No acute intrathoracic abnormality. 2. Large heterogeneously enhancing mass in the right vulva/perineum is concerning for a malignant process. There is also gas within this lesion, suggesting ulceration. Clinical correlation and tissue sampling are recommended for further evaluation. 3. Bilateral inguinal lymphadenopathy. 4. Enlarged, heterogeneous thyroid. Ultrasound is recommended.  RECOMMENDATIONS: Incidental heterogeneous and enlarged thyroid. Recommend thyroid US. Reference: J Am Riana Radiol. 2015 Feb;12(2): 143-50       Assessment:   Principal Problem:    Acute cystitis with hematuria  Active Problems:    Severe sepsis (HCC)    Septic shock (HCC)    Tachycardia    Neutrophilic leukocytosis    Elevated lactic acid level    Elevated liver function tests    Generalized weakness    Fatigue    Vulvar mass    Enlarged, heterogeneous thyroid    DMII (diabetes mellitus, type 2) ([address]    Mixed hyperlipidemia  Resolved Problems:    * No resolved hospital problems. *      Plan:       Acute cystitis with hematuria - patient was started on Cefepime, Vancomycin and Flagyl empirically. Urine culture and sensitivities have been ordered, and antibiotic therapy will be adjusted as necessary based upon those results. Vulvar mass - Large heterogeneously enhancing mass in the right vulva/perineum is concerning for a malignant process. There is also gas within this lesion, suggesting ulceration. Concern for infected mass. Patient was started on Cefepime, Vancomycin and Flagyl empirically     Severe Sepsis with septic shock due to above with tachycardia, neutrophilic leukocytosis. Initial Lactic Acid was elevated. - Pt will be resuscitated with 30 cc/Kg IV Fluids and blood pressure will be monitored closely  - We will cycle serial lactic acid levels until lactic acid has normalized  - Blood cultures x 2 have been ordered    Elevated liver function tests -liver function tests are slightly elevated. Likely associated with underlying infection and sepsis. We will monitor her liver function tests. Generalized weakness/fatigue - Will ask PT/OT to evaluate and treat patient, and if necessary to provide recommendations for post hospital therapy    Enlarged, heterogeneous thyroid -this was an incidental finding on the CTA of her chest abdomen and pelvis. Radiology recommends a thyroid ultrasound.   This can be performed as an

## 2022-05-30 NOTE — PROGRESS NOTES
Patient is a 77year old F I was called to see for perineal mass. Patient states last pelvic exam 15 years ago. Patient states she felt like something was starting then but had a bad experience and did not go back for gyn. Patient has been dealing with pain in vulva and mass for years. Was embarrassed to tell anyone. Now here with fever, infection. Review of Systems: as above  General ROS: negative  Psychological ROS: negative  Ophthalmic ROS: negative  ENT ROS: negative  Allergy and Immunology ROS: negative  Hematological and Lymphatic ROS: negative  Endocrine ROS: negative  Breast ROS: negative  Respiratory ROS: negative  Cardiovascular ROS: negative  Gastrointestinal ROS: negative  Genito-Urinary ROS: as above  Musculoskeletal ROS: negative  Neurological ROS: negative  Dermatological ROS: negative    Date of Birth 1955  Past Medical History:   Diagnosis Date    Congenital prolapsed rectum     Displaced bladder     Enlarged, heterogeneous thyroid 5/29/2022    Hernia     Mixed hyperlipidemia 10/14/2019    Type 2 diabetes mellitus without complication ([facility] [address] 7/68/1084    Varicose vein of leg      History reviewed. No pertinent surgical history. OB History   No obstetric history on file.      Social History     Socioeconomic History    Marital status:      Spouse name: Not on file    Number of children: Not on file    Years of education: Not on file    Highest education level: Not on file   Occupational History    Not on file   Tobacco Use    Smoking status: Never Smoker    Smokeless tobacco: Never Used   Substance and Sexual Activity    Alcohol use: No     Alcohol/week: 0.0 standard drinks    Drug use: No    Sexual activity: Yes     Partners: Male   Other Topics Concern    Not on file   Social History Narrative    Not on file     Social Determinants of Health     Financial Resource Strain:     Difficulty of Paying Living Expenses: Not on file   Food Insecurity:     Worried About Running Out of Food in the Last Year: Not on file    Ran Out of Food in the Last Year: Not on file   Transportation Needs:     Lack of Transportation (Medical): Not on file    Lack of Transportation (Non-Medical):  Not on file   Physical Activity:     Days of Exercise per Week: Not on file    Minutes of Exercise per Session: Not on file   Stress:     Feeling of Stress : Not on file   Social Connections:     Frequency of Communication with Friends and Family: Not on file    Frequency of Social Gatherings with Friends and Family: Not on file    Attends Mandaen Services: Not on file    Active Member of [address] [place] [address] [employer] or Organizations: Not on file    Attends Club or Organization Meetings: Not on file    Marital Status: Not on file   Intimate Partner Violence:     Fear of Current or Ex-Partner: Not on file    Emotionally Abused: Not on file    Physically Abused: Not on file    Sexually Abused: Not on file   Housing Stability:     Unable to Pay for Housing in the Last Year: Not on file    Number of [place] in the Last Year: Not on file    Unstable Housing in the Last Year: Not on file     No Known Allergies  Outpatient Medications Marked as Taking for the 5/29/22 encounter [facility] Encounter)   Medication Sig Dispense Refill    insulin aspart (NOVOLOG) 100 UNIT/ML injection vial Inject 5-6 Units into the skin daily      acetaminophen (TYLENOL) 325 mg tablet Take 650 mg by mouth every 6 hours as needed for Pain      chlorpheniramine (SB CHLORPHENIRAMINE) 4 MG tablet Take 4 mg by mouth every 6 hours as needed for Allergies      Homeopathic Products (RESTFUL LEGS SL) Place 1 tablet under the tongue 4 times daily       Family History   Problem Relation Age of Onset    High Blood Pressure Mother     Hypertension Mother     High Cholesterol Mother     Macular Degen Mother     Thyroid Disease Mother     Cancer Father     Alcohol Abuse Father     High Blood Pressure Sister     High Cholesterol Sister  Thyroid Disease Sister     Heart Disease Brother     Alcohol Abuse Brother      BP (!) 99/53   Pulse 68   Temp 96.9 °F (36.1 °C)   Resp 13   Ht 5' 1\" (1.549 m)   Wt 111 lb 8.8 oz (50.6 kg)   SpO2 95%   BMI 21.08 kg/m²     WDWN in NAD  A and O x 3  ABD-soft, NT, ND, no hsm  PV-EFG with a very large, necrotic, irregular mass along right labia. Necrotic black tissue along right buttock-no normal anatomy around right labia or anus. Cannot do further pelvic exam due to mass    Lab Results   Component Value Date    WBC 25.2 (H) 05/29/2022    HGB 11.0 (L) 05/29/2022    HCT 36.3 05/29/2022    MCV 83.0 05/29/2022     (H) 05/29/2022       This SmartLink has not been configured with any valid records.         Lab Results   Component Value Date     05/29/2022    K 4.1 05/29/2022     05/29/2022    CO2 26 05/29/2022    BUN 25 (H) 05/29/2022    CREATININE 0.8 05/29/2022    GLUCOSE 106 (H) 05/29/2022    CALCIUM 13.9 (HH) 05/29/2022    PROT 7.0 05/29/2022    LABALBU 3.1 (L) 05/29/2022    BILITOT 0.4 05/29/2022    ALKPHOS 132 (H) 05/29/2022     (H) 05/29/2022    ALT 71 (H) 05/29/2022    LABGLOM >60 05/29/2022    GFRAA >60 05/29/2022    AGRATIO 0.8 (L) 05/29/2022    GLOB 2.9 10/18/2021         CT CHEST ABDOMEN PELVIS W CONTRAST [[id number]]    Status: Final result     Order Providers    Authorizing Billing   Samuel Weathers MD   Replaced: CT ABDOMEN PELVIS W IV CONTRAST Additional Contrast? None            Signed by    Signed Date/Time Phone Pager   Dianna Keita 5/29/2022  8:13 [phone]      Reading Providers    Read Date Phone Pager   Dianna Judd May 29, 2022  8:13 [phone]        CT CHEST ABDOMEN PELVIS W CONTRAST: Patient Communication     Not Released  Not seen     Radiation Dose Estimates    No radiation information found for this patient  Narrative   EXAMINATION:   CT OF THE CHEST, ABDOMEN, AND PELVIS WITH CONTRAST 5/29/2022 6:41 pm       TECHNIQUE:   CT of the chest, abdomen and pelvis was performed with the administration of   intravenous contrast. Multiplanar reformatted images are provided for review. Automated exposure control, iterative reconstruction, and/or weight based   adjustment of the mA/kV was utilized to reduce the radiation dose to as low   as reasonably achievable.       COMPARISON:   11/20/2015       HISTORY:   ORDERING SYSTEM PROVIDED HISTORY: Large, necrotic appearing mass/swelling to   vulva/rectal region; pt states has been present for \"years\"   TECHNOLOGIST PROVIDED HISTORY:   Reason for exam:->Large, necrotic appearing mass/swelling to vulva/rectal   region; pt states has been present for \"years\"   Additional Contrast?->None       FINDINGS:       Chest:       Mediastinum: Thyroid is enlarged and heterogeneous.  Heart size is normal   without pericardial effusion.  Thoracic aorta and main pulmonary artery are   normal in caliber.  Coronary artery atherosclerosis.  No mediastinal   lymphadenopathy.       Lungs/pleura: There is no pleural effusion. Milton Terrell is no pneumothorax.  There   is no pulmonary consolidation.       Soft Tissues/Bones: Multilevel degenerative changes of the thoracic spine.           Abdomen/Pelvis:       Organs: No acute abnormality within the liver, spleen, gallbladder, pancreas,   or adrenal glands.  No nephrolithiasis or hydronephrosis.       GI/Bowel: Stomach is nondistended.  The small bowel is nondilated.  The colon   is nondilated.       Pelvis: Bladder is partially distended without vesicular stone.  Uterus is   present.  Endometrial lesions are partially visualized, most consistent with   fibroids.       Peritoneum/Retroperitoneum: No ascites or pneumoperitoneum.  Atherosclerosis   of the nondilated abdominal aorta.  There is a fat containing ventral   abdominal wall hernia.       Bones/Soft Tissues: Multilevel degenerative changes of the lumbar spine.    There is a heterogeneously enhancing mass in the right vulva/perineum   measuring up to 14.1 x 8.8 x 7.5 cm.  There are foci of gas within this   lesion, suggesting ulceration/necrosis.  There are mildly enlarged bilateral   inguinal lymph nodes.           Impression   1. No acute intrathoracic abnormality. 2. Large heterogeneously enhancing mass in the right vulva/perineum is   concerning for a malignant process. Milton Terrell is also gas within this lesion,   suggesting ulceration.  Clinical correlation and tissue sampling are   recommended for further evaluation. 3. Bilateral inguinal lymphadenopathy. 4. Enlarged, heterogeneous thyroid.  Ultrasound is recommended.       RECOMMENDATIONS:   Incidental heterogeneous and enlarged thyroid. Recommend thyroid US. Reference: J Am Riana Radiol. 2015 Feb;12(2): 143-50             Order History    Open Order Details     PACS Images     Show images for CT CHEST ABDOMEN PELVIS W CONTRAST    Results History Report    View Report     External Result Report    External Result Report 2022     Existing Charges    Charge Line Charge Code Status Charge Trigger Charge Type   [id number]  Ct Chest W/ Contrast [[id number]] [address] Billing Imaging end exam Technical   [id number] Hc Ct Abd/pel W Cont [[id number]] [facility] Billing Imaging end exam Technical   [id number] DIAGNOSTIC COMPUTED TOMOGRAPHY THORAX W/CONTRAST [id number] Deleted Imaging result study Professional   [id number] [facility] AND PELVIS,W CONTRAST [address] Deleted Imaging result study Professional     Order Report     Order Details    Documentation Timeline (5/29/2022 23:55:48 to 5/29/2022 23:55:48)    5/29/2022 Event Details User   16:41 In Facility Status: Arrived --           Plan-patient is a 77year old F  1-vulvar mass-necrotic and quite extensive. Bilateral adenopathy on CT. Feel could be vulvar in origin-? Chance rectal but given massively enlarged labia feel like vulvar. Seems to appear non-operable.  Discussed with -team will see her  2. Infection-elevated WBC-infection urine and also necrotic tissue    Discussed serious situation with patient and sister. They are still trying to understand. Explained extensive tumor and that she is very ill.

## 2022-05-30 NOTE — PROGRESS NOTES
Pt arrived via stretcher from ED to room 5121. Heart monitor connected and verified with [facility]. VS, assessment, and admission complete. 4 eyes assessment complete. Pt oriented to unit and room. Call light and bedside table in reach. Pt's sister at bedside. All questions answered at this time.  Electronically signed by Mary Kate Osborn RN on 5/29/2022 at 10:42 PM

## 2022-05-30 NOTE — CONSULTS
GYNECOLOGIC ONCOLOGY CONSULTATION:     5/30/2022 10:38 AM    REASON FOR CONSULT: Vulvar Mass    REFERRING PROVIDER: No referring provider defined for this encounter. PCP: HERIBERTO Pinto - [facility]      CHIEF COMPLAINT:     Chief Complaint   Patient presents with    Extremity Weakness     generalized BLE weakness and \"shakey\" since Thursday, fell in the bathroom hit head on Friday night. no blood thinner and did have a loss of consciousness    Wound Check     large sore around the rectum, with a prolapsed rectum does not have a provider taking careof that.  Fatigue     over the past two to three weeks, has noticed a decreased to no appetite, losing weight of 28lbs       HISTORY OF PRESENT ILLNESS:     HPI: This is a 77 y.o. 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 She states that she has bleeding from the mass. She is able to manage her hygiene. She is not incontinent. She denies fever or chills. She has lost 50 lbs over the past year because the pain has impacted her appetite. She states she does have regular medical care for her medical problems but has been disinclined to discuss the vulvar mass      PAST MEDICAL HISTORY:     Past Medical History:   Diagnosis Date    Congenital prolapsed rectum     Displaced bladder     Enlarged, heterogeneous thyroid 5/29/2022    Hernia     Mixed hyperlipidemia 10/14/2019    Type 2 diabetes mellitus without complication ([address] 7/26/9157    Varicose vein of leg        ROS:   Review of Systems - General ROS: negative    PAST SURGICAL HISTORY:      History reviewed. No pertinent surgical history.      OB/GYN HISTORY:       SOCIAL HISTORY:     Social History     Socioeconomic History    Marital status:      Spouse name: Not on file    Number of children: Not on file    Years of education: Not on file    Highest education level: Not on file   Occupational History    Not on file Tobacco Use    Smoking status: Never Smoker    Smokeless tobacco: Never Used   Substance and Sexual Activity    Alcohol use: No     Alcohol/week: 0.0 standard drinks    Drug use: No    Sexual activity: Yes     Partners: Male   Other Topics Concern    Not on file   Social History Narrative    Not on file     Social Determinants of Health     Financial Resource Strain:     Difficulty of Paying Living Expenses: Not on file   Food Insecurity:     Worried About Running Out of Food in the Last Year: Not on file    Bhavana of Food in the Last Year: Not on file   Transportation Needs:     Lack of Transportation (Medical): Not on file    Lack of Transportation (Non-Medical): Not on file   Physical Activity:     Days of Exercise per Week: Not on file    Minutes of Exercise per Session: Not on file   Stress:     Feeling of Stress : Not on file   Social Connections:     Frequency of Communication with Friends and Family: Not on file    Frequency of Social Gatherings with Friends and Family: Not on file    Attends Anabaptist Services: Not on file    Active Member of [address] [place] [address] or Organizations: Not on file    Attends Club or Organization Meetings: Not on file    Marital Status: Not on file   Intimate Partner Violence:     Fear of Current or Ex-Partner: Not on file    Emotionally Abused: Not on file    Physically Abused: Not on file    Sexually Abused: Not on file   Housing Stability:     Unable to Pay for Housing in the Last Year: Not on file    Number of [place] in the Last Year: Not on file    Unstable Housing in the Last Year: Not on file   .     FAMILY HISTORY:     Family History   Problem Relation Age of Onset    High Blood Pressure Mother     Hypertension Mother     High Cholesterol Mother     Macular Degen Mother     Thyroid Disease Mother     Cancer Father     Alcohol Abuse Father     High Blood Pressure Sister     High Cholesterol Sister     Thyroid Disease Sister     Heart Disease Brother     Alcohol Abuse Brother      HEALTH MAINTENANCE:   Last Pap: Patient has never had a Pap test.  MMG History: Patient does not recall date of the last mammogram.    ALLERGIES:     Allergies as of 05/29/2022    (No Known Allergies)       MEDICATIONS:     No current facility-administered medications on file prior to encounter. Current Outpatient Medications on File Prior to Encounter   Medication Sig Dispense Refill    insulin aspart (NOVOLOG) 100 UNIT/ML injection vial Inject 5-6 Units into the skin daily      acetaminophen (TYLENOL) 325 mg tablet Take 650 mg by mouth every 6 hours as needed for Pain      chlorpheniramine (SB CHLORPHENIRAMINE) 4 MG tablet Take 4 mg by mouth every 6 hours as needed for Allergies      Homeopathic Products (RESTFUL LEGS SL) Place 1 tablet under the tongue 4 times daily      gabapentin (NEURONTIN) 300 MG capsule FILL 11/3 1 tab in am 1 tab afternoon 1 tab at dinner 3 tabs at bedtime 180 capsule 5    insulin glargine (LANTUS;BASAGLAR) 100 UNIT/ML injection pen Inject 20 Units into the skin nightly (Patient taking differently: Inject 22 Units into the skin 2 times daily ) 5 pen 3    rosuvastatin (CRESTOR) 5 MG tablet TAKE ONE TABLET BY MOUTH ONCE NIGHTLY 90 tablet 1       PHYSICAL EXAM:       BP (!) 95/48   Pulse 57   Temp 98.1 °F (36.7 °C) (Oral)   Resp 17   Ht 5' 1\" (1.549 m)   Wt 111 lb 8.8 oz (50.6 kg)   SpO2 96%   BMI 21.08 kg/m²     General appearance: very pale, chronically ill appearing alert and cooperative  Head: Normocephalic, without obvious abnormality, atraumatic  Neck: No palpable lymphadenopathy in supraclavicular or cervical chains  Back: No tenderness of the bony spine or costovertebral angle  Breasts: Examined in supine and upright positions. Soft, no evidence of palpable masses, skin changes, dimpling, nipple discharge, abnormal vessel patterns, or axillary adenopathy.     Lungs: Clear to auscultation bilaterally, no audible rales, wheezes or crackles; good diaphragmatic excursion. Heart: Regular rate and rhythm, S1, S2 normal; no rub, murmur, or gallop noted. Abdominal exam: 4 cm umbilical hernia soft, non-tender, no organomegaly, masses; multiple palpable inguinal lymph nodes, not pathologic in terms of size  Pelvic exam: . Massive 15 x 9x  3 cm bilateral fungating, necrotic, keratinized, foul smelling mass which extends from just below the mons to posterior perianal area; no normal anatomy is identifiable; the mass invades underlying tissues with involvement reaching and probably involving the pubic arch superiorly and the medial aspects of the ischium bilaterally. There seems to be extension of disease just past the introitus        Extremities: without cyanosis, clubbing, edema or asymmetry  Skin: No jaundice, purpura or petechiae      LABS:   No results found for:     No results found for: CEA    No results found for:     Lab Results   Component Value Date    WBC 24.0 (H) 05/30/2022    HGB 8.7 (L) 05/30/2022    HCT 28.6 (L) 05/30/2022    MCV 83.0 05/30/2022     05/30/2022       Lab Results   Component Value Date    GLUCOSE 111 (H) 05/30/2022    BUN 18 05/30/2022    CREATININE 0.5 (L) 05/30/2022    K 3.0 (L) 05/30/2022    PHOS 3.1 11/25/2015       Lab Results   Component Value Date    ALKPHOS 84 05/30/2022    AST 72 (H) 05/30/2022       Lab Results   Component Value Date    MG 1.80 05/30/2022       No results found for: URICACID    No results found for: LDH    No components found for: PT,  INR    No results found for: IRON, TIBC, FERRITIN    IMAGING:     No valid procedures specified.       ASSESSMENT:     Problem List Items Addressed This Visit     Generalized weakness    Severe sepsis ([address] - Primary    Vulvar mass      Other Visit Diagnoses     Urinary tract infection without hematuria, site unspecified        Syncope and collapse        Hypercalcemia            Very detailed discussion about her exam  Patient states that she is continent of urine and stool  She has sought care because of the severity of her pain  She understands that at this time her disease is too advanced to be treated surgically and that the approach will be with radiation and chemotherapy - she understands and agrees  I will arrange these consults  Will need a biopsy- I will do this on a week day  She will need a social service consult and nutrition consult  Infectious disease recommendations may be of help  Trend Hgb 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 Discussed the likelihood of adjuvant therapy in the event that this is a malignant process. Discussed DVT prophylaxis, infection prophylaxis, possibility of transfusion. Illustrative information provided. PLAN:   See above                 Shiv Brown.  Tanvi Arreguin, [address] [facility]  [address] [facility]  [phone]

## 2022-05-31 PROBLEM — E43 SEVERE MALNUTRITION (HCC): Chronic | Status: ACTIVE | Noted: 2022-01-01

## 2022-05-31 NOTE — CONSULTS
[facility]    Consult Note      REQUESTING PHYSICIAN:  Dr Ryan Prajapati PROVIDER: Margie Loja, APRN - CNP      HISTORY OF PRESENT ILLNESS:      The patient is a 55-year-old female with history of hyperlipidemia and diabetes who presented to the emergency room on 529 2 with chief complaints of fatigue, decreased appetite, weakness in her lower extremities, and a mass in the vulvar perineal region. The patient claims to have had the mass for many years and had suffered a nearly 50 pound weight loss. Lori Ashley Physical examination showed a mass in that region prompting further radiographic studies. On 5/29/2022 a CT scan of the chest abdomen and pelvis was noteworthy for a large necrotic appearing mass in the vulvar rectal region. This measured 14.1 x 8.8 x 7.5 cm with foci of gas within the lesion suggesting ulceration/process. There were mildly enlarged bilateral inguinal nodes. Incidentally noted was an enlarged thyroid. She was seen by Dr. Mai Vail of gynecologic oncology on 5/30/2022 and she identified a 15 x 9 x 3 cm bilateral fungating necrotic mass just below the mons. Labs were no for there for a white count of 24, hemoglobin 8.7, hematocrit 28.6 and platelet count 257. Red blood cell indices were microcytic. Renal function was normal. The patient was also hypercalcemic with a calcium of 13.9 on 5/29/2022. On 5/30 it was down to 10.7 with hydration. Today the calcium is 10.4 but the albumin is 2.1. (Calcium correction for hypoalbuminemia 11.9)    The patient also had complained that she had had a fall in the bathroom and had been knocked unconscious. She had a CT of the head without contrast showed no acute intracranial abnormality and a CT of the cervical spine that showed no acute abnormalities. There was mild degenerative changes. The patient was also started on broad-spectrum antibiotics for a UTI possible sepsis.      PAST MEDICAL HISTORY:      Past Medical History:   Diagnosis Date    Congenital prolapsed rectum     Displaced bladder     Enlarged, heterogeneous thyroid 5/29/2022    Hernia     Mixed hyperlipidemia 10/14/2019    Type 2 diabetes mellitus without complication ([facility] [address] 3/17/2364    Varicose vein of leg        PAST SURGICAL HISTORY:      History reviewed. No pertinent surgical history.     CURRENT MEDICATIONS:    Current Facility-Administered Medications   Medication Dose Route Frequency Provider Last Rate Last Admin    magnesium sulfate 2000 mg in 50 mL IVPB premix  2,000 mg IntraVENous Once Jimmy M Yoan, DO 25 mL/hr at 05/31/22 0553 2,000 mg at 05/31/22 0553    potassium chloride 10 mEq/100 mL IVPB (Peripheral Line)  10 mEq IntraVENous Q1H Jimmy M Yoan,  mL/hr at 05/31/22 0558 10 mEq at 05/31/22 0558    vancomycin (VANCOCIN) intermittent dosing (placeholder)   Other RX Placeholder Nixon Vega MD        vancomycin 1000 mg IVPB in 250 mL D5W addavial  1,000 mg IntraVENous Q18H Nixon Vega MD   Stopped at 05/30/22 1916    traMADol (ULTRAM) tablet 25 mg  25 mg Oral Q6H PRN Santosh Patel MD   25 mg at 05/30/22 1020    oxyCODONE (ROXICODONE) immediate release tablet 2.5 mg  2.5 mg Oral Q3H PRN Maury Yuan MD   2.5 mg at 05/30/22 2044    cefepime (MAXIPIME) 2000 mg IVPB minibag  2,000 mg IntraVENous Q12H Nixon Vega MD   Stopped at 05/30/22 2118    metronidazole (FLAGYL) 500 mg in 0.9% NaCl 100 mL IVPB premix  500 mg IntraVENous Q8H Nixon Vega MD   Stopped at 05/31/22 0146    rosuvastatin (CRESTOR) tablet 5 mg  5 mg Oral Nightly Nixon Vega MD   5 mg at 05/30/22 2044    insulin glargine (LANTUS) injection vial 22 Units  22 Units SubCUTAneous BID Nixon Vega MD   22 Units at 05/30/22 2048    gabapentin (NEURONTIN) capsule 300 mg  300 mg Oral QAM Nixon Vega MD   300 mg at 05/30/22 0750    And    gabapentin (NEURONTIN) capsule 300 mg  300 mg Oral Lunch Nixon Vega MD   300 mg at 05/30/22 1228    And    gabapentin (NEURONTIN) capsule 300 mg  300 mg Oral QPM Temo Escobar MD   300 mg at 05/30/22 1652    And    gabapentin (NEURONTIN) capsule 900 mg  900 mg Oral Nightly Temo Escobar MD   900 mg at 05/30/22 2045    insulin lispro (HUMALOG) injection vial 0-12 Units  0-12 Units SubCUTAneous TID WC Temo Escobar MD        insulin lispro (HUMALOG) injection vial 0-6 Units  0-6 Units SubCUTAneous Nightly Temo Escobar MD        glucose chewable tablet 16 g  4 tablet Oral PRN Temo Escobar MD        dextrose bolus 10% 125 mL  125 mL IntraVENous PRN Temo Escobar MD   Stopped at 05/31/22 0548    Or    dextrose bolus 10% 250 mL  250 mL IntraVENous PRN Temo Escobar MD        glucagon (rDNA) injection 1 mg  1 mg IntraMUSCular PRN Temo Escobar MD        dextrose 5 % solution  100 mL/hr IntraVENous PRN Temo Escobar MD        sodium chloride flush 0.9 % injection 10 mL  10 mL IntraVENous 2 times per day Temo Escobar MD        sodium chloride flush 0.9 % injection 10 mL  10 mL IntraVENous PRN Temo Escobar MD        0.9 % sodium chloride infusion   IntraVENous PRN Temo Escobar MD        ondansetron [facility]) injection 4 mg  4 mg IntraVENous Q4H PRN Temo Escobar MD        polyethylene glycol [facility]) packet 17 g  17 g Oral Daily PRN Temo Escobar MD        acetaminophen (TYLENOL) tablet 650 mg  650 mg Oral Q4H PRN Temo Escobar MD   650 mg at 05/29/22 2310    Or    acetaminophen (TYLENOL) suppository 650 mg  650 mg Rectal Q4H PRN Temo Escobar MD        0.9 % sodium chloride infusion   IntraVENous Continuous Temo Escobar  mL/hr at 05/31/22 0214 New Bag at 05/31/22 0214       ALLERGIES:    No Known Allergies    SOCIAL HISTORY:    Social History     Socioeconomic History    Marital status:      Spouse name: None    Number of children: None    Years of education: None    Highest education level: None   Occupational History    None   Tobacco Use    Smoking status: Never Smoker    Smokeless tobacco: Never Used   Substance and Sexual Activity    Alcohol use: No     Alcohol/week: 0.0 standard drinks    Drug use: No    Sexual activity: Yes     Partners: Male   Other Topics Concern    None   Social History Narrative    None     Social Determinants of Health     Financial Resource Strain:     Difficulty of Paying Living Expenses: Not on file   Food Insecurity:     Worried About Running Out of Food in the Last Year: Not on file    Bhavana of Food in the Last Year: Not on file   Transportation Needs:     Lack of Transportation (Medical): Not on file    Lack of Transportation (Non-Medical): Not on file   Physical Activity:     Days of Exercise per Week: Not on file    Minutes of Exercise per Session: Not on file   Stress:     Feeling of Stress : Not on file   Social Connections:     Frequency of Communication with Friends and Family: Not on file    Frequency of Social Gatherings with Friends and Family: Not on file    Attends Druze Services: Not on file    Active Member of [address] [place] [address] or Organizations: Not on file    Attends Club or Organization Meetings: Not on file    Marital Status: Not on file   Intimate Partner Violence:     Fear of Current or Ex-Partner: Not on file    Emotionally Abused: Not on file    Physically Abused: Not on file    Sexually Abused: Not on file   Housing Stability:     Unable to Pay for Housing in the Last Year: Not on file    Number of [place] in the Last Year: Not on file    Unstable Housing in the Last Year: Not on file   :    , 2 daughters, 2 grandchildren ages 6 and 8. Formerly worked in finance. Her  is retired and he was a former .     FAMILY HISTORY:    Family History   Problem Relation Age of Onset    High Blood Pressure Mother     Hypertension Mother     High Cholesterol Mother     Macular Degen Mother     Thyroid Disease Mother    Floydene Ada Cancer Father     Alcohol Abuse Father     High Blood Pressure Sister     High Cholesterol Sister     Thyroid Disease Sister     Heart Disease Brother     Alcohol Abuse Brother        REVIEW OF SYSTEMS:      · Constitutional: Denies fever, sweats, pos weight loss. · Eyes: No visual changes or diplopia. No scleral icterus. · ENT: No Headaches, hearing loss or vertigo. No mouth sores or sore throat. · Cardiovascular: No chest pain, mild dyspnea on exertion, palpitations or loss of consciousness. · Respiratory: No cough or wheezing, no sputum production. No hemoptysis. .    · Gastrointestinal: No abdominal pain, appetite loss, blood in stools. No change in bowel habits. · Genitourinary: See HPI  · Musculoskeletal:  Generalized weakness. No joint complaints. · Integumentary: No rash or pruritis. · Neurological: No headache, diplopia. No change in gait, balance, or coordination. No paresthesias. · Endocrine: No temperature intolerance. No excessive thirst, fluid intake, or urination. · Hematologic/Lymphatic: No abnormal bruising or ecchymoses, blood clots or swollen lymph nodes. · Allergic/Immunologic: No nasal congestion or hives. PHYSICAL EXAM:      Vitals:  BP (!) 102/47   Pulse 68   Temp 98.3 °F (36.8 °C) (Oral)   Resp 16   Ht 5' 1\" (1.549 m)   Wt 118 lb 9.7 oz (53.8 kg)   SpO2 95%   BMI 22.41 kg/m²     CONSTITUTIONAL: Chronically ill-appearing, no acute distress. Pale. Thin  EYES: Sclera clear, conjunctiva normal  ENT: Oral pharynx unremarkable with moist mucus membranes  LUNGS: Clear to auscultation. No increased labor with breathing. BREAST: Defered  CARDIOVASCULAR: Regular rate and rhythm, normal S1 and S2, no S3 or S4, and no murmur noted. ABDOMEN: Soft, non-distended, non-tender  :  Defered  MUSCULOSKELETAL: There is no redness, warmth, or swelling of the joints. NEUROLOGIC: Awake, alert. Grossly non-focal.  SKIN: No bruising or bleeding. Pale  PSYCH: Normal affect.       LAB DATA:    Labs:  General Labs:  CBC with Differential:    Lab Results   Component Value Date    WBC 21.8 05/31/2022    RBC 3.34 05/31/2022    HGB 8.5 05/31/2022    HCT 27.6 05/31/2022     05/31/2022    MCV 82.7 05/31/2022    MCH 25.3 05/31/2022    MCHC 30.6 05/31/2022    RDW 16.6 05/31/2022    LYMPHOPCT 11.8 05/31/2022    MONOPCT 4.8 05/31/2022    BASOPCT 0.3 05/31/2022    MONOSABS 1.0 05/31/2022    LYMPHSABS 2.6 05/31/2022    EOSABS 0.3 05/31/2022    BASOSABS 0.1 05/31/2022     CMP:    Lab Results   Component Value Date     05/31/2022    K 3.1 05/31/2022     05/31/2022    CO2 25 05/31/2022    BUN 11 05/31/2022    CREATININE 0.5 05/31/2022    GFRAA >60 05/31/2022    AGRATIO 0.8 05/31/2022    LABGLOM >60 05/31/2022    GLUCOSE 61 05/31/2022    PROT 4.8 05/31/2022    CALCIUM 10.4 05/31/2022    BILITOT <0.2 05/31/2022    ALKPHOS 80 05/31/2022    AST 39 05/31/2022    ALT 42 05/31/2022     Magnesium:    Lab Results   Component Value Date    MG 1.60 05/31/2022       U/A:    Lab Results   Component Value Date    NITRITE NEG 10/20/2020    COLORU Yellow 05/29/2022    PHUR 5.0 05/29/2022    WBCUA 289 05/29/2022    RBCUA 169 05/29/2022    MUCUS Present 05/29/2022    BACTERIA 4+ 05/29/2022    CLARITYU TURBID 05/29/2022    SPECGRAV 1.026 05/29/2022    LEUKOCYTESUR MODERATE 05/29/2022    UROBILINOGEN 0.2 05/29/2022    BILIRUBINUR Negative 05/29/2022    BILIRUBINUR NEG 10/20/2020    BLOODU LARGE 05/29/2022    GLUCOSEU Negative 05/29/2022       CT CHEST ABDOMEN PELVIS W CONTRAST   Final Result   1. No acute intrathoracic abnormality. 2. Large heterogeneously enhancing mass in the right vulva/perineum is   concerning for a malignant process. There is also gas within this lesion,   suggesting ulceration. Clinical correlation and tissue sampling are   recommended for further evaluation. 3. Bilateral inguinal lymphadenopathy. 4. Enlarged, heterogeneous thyroid. Ultrasound is recommended. 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 These are somewhat uncommon tumors with just over 6000 cases reported nationally each year and approximately 1500 deaths. Almost always these are HPV associated tumors. Squamous cell histology is the most common histology but other rare types can also be seen. We will need pathologic confirmation of the tumor type. Squamous cell tumors are often associated with hypercalcemia malignancy. The patient has been hypercalcemic and is borderline in need of treatment even today with a calcium of 10.4. Corrected it is 11.9. We will go ahead and dose the patient with pamidronate as it will be a bit before the patient is actually treated for her cancer. Today met at length with the patient and talk to her about her cancer diagnosis. We talked about the nature of that illness, its epidemiology, risk factors, and management. The patient currently is not resectable but would be a candidate for chemoradiation. The rationale for chemoradiation may be found below in the section taken from up-to-date. To facilitate treatment the patient would require port placement. She will be seen later today by radiation oncology and we will need to work with them as well. I would recommend weekly cisplatin to be given con currently. Side effects of cisplatin were discussed. The patient currently is on gabapentin and has a history of diabetes but does not have diabetic neuropathy. The gabapentin was given to her for management of her varicose veins. Whether this is neuropathic I am not sure. We will monitor. Effective treatment on her comorbidities were discussed. The patient is very much aware of the fact that treatment could be challenging and that will require adherence to treatment recommendations if we are to keep her as an outpatient. Family was not at bedside for today's conversation.   We will arrange for further teaching of not only the patient but family as well. The patient was noted to have an abnormal thyroid. An ultrasound has been recommended. The patient has a significant, symptomatic anemia. Red blood cell indices would suggest that this could be iron deficiency. We will go ahead and empirically begin the patient on oral iron while we await the results. Colonoscopy is usually recommended in the evaluation of with vulvar cancer. At some point this should also be considered. The patient's albumin is only 2.1 and she looks a bit nutritionally deplete. We will consult dietary. I will asked that the patient be started on Ensure supplementation. From UpToDate:    DISEASE THAT CANNOT BE MANAGED SURGICALLY  Unresectable, locally advanced disease -- Resection of the primary tumor and inguinofemoral lymphadenectomy may not be possible when nodes are fixed to the femoral vessels or other vital structures. (See \"Squamous cell carcinoma of the vulva: Staging and surgical treatment\", section on 'Staging and surgical treatment'. )  Primary treatment -- For patients with unresectable, locally advanced disease, we prefer chemoradiation to radiation therapy (RT) alone. We utilize weekly cisplatin 50 mg/m2 concurrently with radiation and include the vulva, groin, and lymph nodes in our radiation fields. For patients who are not candidates for chemotherapy, we administer primary RT alone. (See 'Chemoradiation versus RT alone' above.)  We prefer to administer single-agent cisplatin 50 mg/m2 plus RT, although the use of concurrent fluorouracil (FU) alone or in combination with cisplatin or mitomycin may also be used [13]. Chemoradiation commonly employs total radiation doses that are 10 to 20 percent less than primary RT, and may therefore result in lesser local toxicity.  The bilateral inguinofemoral lymph nodes may be included within the treatment target volume for these patients, although prospective data to section on 'Primary chemoradiation' and \"Treatment of anal cancer\", section on 'Chemoradiotherapy'. )  There are no prospective trials comparing RT alone to chemoradiation in the treatment of vulvar cancer. However, indirect evidence suggests that chemoradiation may be equivalent to surgery in patients with resectable disease, therefore providing a rationale for its use in patients for whom surgery is not an option. A 2011 systematic review of three studies (one randomized) compared primary surgery with chemoradiation in women with locally advanced, primary SCC [18]. Compared with primary surgery, the use of chemoradiation alone resulted in no difference in overall mortality (hazard ratio [HR] 1.09, 95% CI 0.37-3.17); however, the wide confidence intervals suggest there are insufficient data to make a definitive conclusion, and all three studies were determined to be at moderate or high risk of bias. DISEASE THAT CANNOT BE MANAGED SURGICALLY  Unresectable, locally advanced disease -- Resection of the primary tumor and inguinofemoral lymphadenectomy may not be possible when nodes are fixed to the femoral vessels or other vital structures. (See \"Squamous cell carcinoma of the vulva: Staging and surgical treatment\", section on 'Staging and surgical treatment'. )  Primary treatment -- For patients with unresectable, locally advanced disease, we prefer chemoradiation to radiation therapy (RT) alone. We utilize weekly cisplatin 50 mg/m2 concurrently with radiation and include the vulva, groin, and lymph nodes in our radiation fields. For patients who are not candidates for chemotherapy, we administer primary RT alone. (See 'Chemoradiation versus RT alone' above.)  We prefer to administer single-agent cisplatin 50 mg/m2 plus RT, although the use of concurrent fluorouracil (FU) alone or in combination with cisplatin or mitomycin may also be used [13].  Chemoradiation commonly employs total radiation doses that are 10 to 20 percent less than primary RT, and may therefore result in lesser local toxicity. The bilateral inguinofemoral lymph nodes may be included within the treatment target volume for these patients, although prospective data to inform the benefits and risks of doing so are sparse. However, when appropriate radiation techniques are applied and synchronous chemotherapy administered, groin relapses are very rare [14,15]. Management after chemoradiation is discussed below. (See 'Management after primary treatment' below.)  Appropriate candidates for chemoradiation include patients with:  ? Anorectal, urethral, or bladder involvement (in an effort to avoid colostomy and urostomy)  ? Disease that is fixed to the bone  ? Gross inguinal or femoral node involvement (regardless of whether a debulking lymphadenectomy was performed)  The benefit of chemoradiation is shown in the studies below:  ? In Gynecologic Oncology Group (GOG) 101, which included 55 patients with unresectable, node-positive (N2 or N3) vulvar carcinoma (table 1), 38 patients were able to undergo surgery after chemoradiation (those who had a complete response underwent surgical biopsy only) [16]. Of 37 who underwent a lymphadenectomy, 15 (40.5 percent) had no evidence of pathologic node involvement. At a median follow-up of 6.5 years, 12 patients (26 percent) were alive without evidence of disease. ? In , 58 patients with unresectable vulvar cancer underwent chemoradiation for locally advanced vulvar carcinoma [17]. Of 40 patients who completed chemoradiation, 37 had a complete clinical response. Among these women, 34 underwent surgical biopsy, of whom 29 had a complete pathologic response (78 percent).    Despite the lack of high-quality data in vulvar cancer, we feel chemoradiation is an appropriate option in selected patients with locally advanced vulvar cancer based on our experience with chemoradiation in cervical cancer and cancers of the anal canal. Chemoradiation compared with radiation alone has led to decreased recurrence rates of anal cancer and has been associated with a survival benefit in cervical cancer. (See \"Management of locally advanced cervical cancer\", section on 'Primary chemoradiation' and \"Treatment of anal cancer\", section on 'Chemoradiotherapy'. )  There are no prospective trials comparing RT alone to chemoradiation in the treatment of vulvar cancer. However, indirect evidence suggests that chemoradiation may be equivalent to surgery in patients with resectable disease, therefore providing a rationale for its use in patients for whom surgery is not an option. A 2011 systematic review of three studies (one randomized) compared primary surgery with chemoradiation in women with locally advanced, primary SCC [18]. Compared with primary surgery, the use of chemoradiation alone resulted in no difference in overall mortality (hazard ratio [HR] 1.09, 95% CI 0.37-3.17); however, the wide confidence intervals suggest there are insufficient data to make a definitive conclusion, and all three studies were determined to be at moderate or high risk of bias. Thank you very much for allowing me to participate in the care of this patient. I will plan to keep you fully appraised of your patient's progress. Felix Simmons. Chandrakant Moore M.D., MPH  Chair, Department of  Clinical [address]  Office [phone]  Cell [phone]  [email or web address]       \"Participating in a clinical trial is the first step to fighting cancer; not the last.\"    5/31/2022 7:02 AM

## 2022-05-31 NOTE — PLAN OF CARE
Problem: Discharge Planning  Goal: Discharge to home or other facility with appropriate resources  Outcome: Progressing     Problem: Pain  Goal: Verbalizes/displays adequate comfort level or baseline comfort level  Outcome: Progressing     Problem: Skin/Tissue Integrity  Goal: Absence of new skin breakdown  Description: 1. Monitor for areas of redness and/or skin breakdown  2. Assess vascular access sites hourly  3. Every 4-6 hours minimum:  Change oxygen saturation probe site  4. Every 4-6 hours:  If on nasal continuous positive airway pressure, respiratory therapy assess nares and determine need for appliance change or resting period. Outcome: Progressing     Problem: Safety - Adult  Goal: Free from fall injury  Outcome: Progressing  Flowsheets (Taken 5/30/2022 2026)  Free From Fall Injury: Instruct family/caregiver on patient safety     Problem: Metabolic/Fluid and Electrolytes - Adult  Goal: Electrolytes maintained within normal limits  Outcome: Progressing     Problem: Metabolic/Fluid and Electrolytes - Adult  Goal: Hemodynamic stability and optimal renal function maintained  Outcome: Progressing     Problem: Metabolic/Fluid and Electrolytes - Adult  Goal: Glucose maintained within prescribed range  Outcome: Progressing     Problem: ABCDS Injury Assessment  Goal: Absence of physical injury  Outcome: Progressing     Continue with plan of care.

## 2022-05-31 NOTE — PROGRESS NOTES
Comprehensive Nutrition Assessment    Type and Reason for Visit:  Initial    Nutrition Recommendations/Plan:   1. Continue current regular diet; 5 carb  2. Glucerna TID     Malnutrition Assessment:  Malnutrition Status:  Severe malnutrition (05/31/22 1411)    Context:  Chronic Illness     Findings of the 6 clinical characteristics of malnutrition:  Energy Intake:  75% or less estimated energy requirements for 1 month or longer  Weight Loss:  Greater than 20% over 1 year     Body Fat Loss:  No significant body fat loss     Muscle Mass Loss:  Mild muscle mass loss Clavicles (pectoralis & deltoids),Thigh (quadraceps),Hand (interosseous),Temples (temporalis)  Fluid Accumulation:  Mild Extremities   Strength:  Not Performed    Nutrition Assessment:    MST3. Pt admitted for acute cystitis w/ hematuria and severe sepsis. Pt experienced a fall at home. Hx of DM, hernia, displaced bladder. Pt had unitended weight loss over the last year of 50+lbs and over 22% bw. Pt stated loss of appetite from pain. Supplement added per request of nurse and pt. Nutrition Related Findings:    LBM 5/29 Wound Type: None       Current Nutrition Intake & Therapies:    Average Meal Intake: 51-75%  Average Supplements Intake: Unable to assess  ADULT DIET; Regular; 5 carb choices (75 gm/meal)  ADULT ORAL NUTRITION SUPPLEMENT; Breakfast, Lunch, Dinner; Diabetic Oral Supplement    Anthropometric Measures:  Height: 5' 1\" (154.9 cm)  Ideal Body Weight (IBW): 105 lbs (48 kg)    Admission Body Weight: 128 lb (58.1 kg)  Current Body Weight: 118 lb (53.5 kg), 112.4 % IBW. Weight Source: Bed Scale  Current BMI (kg/m2): 22.3  Usual Body Weight: 130 lb (59 kg)  % Weight Change (Calculated): -9.2  Weight Adjustment For: No Adjustment                 BMI Categories: Normal Weight (BMI 18.5-24. 9)    Estimated Daily Nutrient Needs:  Energy Requirements Based On: Kcal/kg     Energy (kcal/day): 1350 - 1620 kcals/day (25 - 30 kcals/kg ABW)  Weight Used for Protein Requirements: Current  Protein (g/day): 54 - 108 g/day (1 - 2g/kg ABW)  Method Used for Fluid Requirements: 1 ml/kcal  Fluid (ml/day):      Nutrition Diagnosis:   · Severe malnutrition related to  (loss of appetite) as evidenced by Criteria as identified in malnutrition assessment      Nutrition Interventions:   Food and/or Nutrient Delivery: Continue Current Diet,Start Oral Nutrition Supplement  Nutrition Education/Counseling: No recommendation at this time  Coordination of Nutrition Care: Continue to monitor while inpatient       Goals:     Goals: Meet at least 75% of estimated needs       Nutrition Monitoring and Evaluation:   Behavioral-Environmental Outcomes: None Identified  Food/Nutrient Intake Outcomes: Food and Nutrient Intake,Supplement Intake  Physical Signs/Symptoms Outcomes: Biochemical Data,Constipation,Nutrition Focused Physical Findings,Skin,Weight    Discharge Planning:    No discharge needs at this time     [address]: [address]

## 2022-05-31 NOTE — CONSULTS
RADIATION ONCOLOGY CONSULT     Patient: Devin Tompkins  YOB: 1955    Date of Consultation: 5/31/2022    Reason for Consultation: Neglected vulvar cancer    PCP: HERIBERTO Burton CNP      CHIEF COMPLAINT:     Chief Complaint   Patient presents with    Extremity Weakness     generalized BLE weakness and \"shakey\" since Thursday, fell in the bathroom hit head on Friday night. no blood thinner and did have a loss of consciousness    Wound Check     large sore around the rectum, with a prolapsed rectum does not have a provider taking careof that.  Fatigue     over the past two to three weeks, has noticed a decreased to no appetite, losing weight of 28lbs       HISTORY OF PRESENT ILLNESS:     Ms. Isela Turner is a 77 y.o. 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 She presented with a longstanding history of a perineal mass that she declined to seek medical attention for. She ultimately presented to the ED on 5/29/2022 with complaints of pain, bleeding, weight loss, fatigue, and weakness. On 5/29/2022, CT C/A/P demonstrated a heterogeneously enhancing mass in the right vulva/perineum measuring up to 14.1 x 8.8 x 7.5 centimeters concerning for malignant process with mildly enlarged bilateral inguinal lymph nodes and enlarged heterogeneous thyroid. On 5/30/2022, she was evaluated by Dr. Maged Romero of gynecologic oncology who noted a massive bilateral fungating necrotic keratinized foul-smelling mass extending from just below the mons to posterior perianal area, with difficulty discerning normal anatomy, with suspicion for invasion of the pubic arch and medial aspects of the ischium bilaterally. She feels fair on today's visit. She endorses intermittent moderate discomfort in the perineal region, made worse by sitting or lying directly on her back.   She notes a large perineal mass that has been present for several years and that frequently bleeds. She notes relatively normal urinary function with some urgency but denies dysuria, urinary incontinence, or hematuria. She notes 1-2 normal bowel movements a day without sensation of obstruction or incontinence. She notes a 50 pound weight loss over the last approximately 2 years. PAST MEDICAL HISTORY:     Past Medical History:   Diagnosis Date    Congenital prolapsed rectum     Displaced bladder     Enlarged, heterogeneous thyroid 5/29/2022    Hernia     Mixed hyperlipidemia 10/14/2019    Severe malnutrition ([facility] [address] 5/31/2022    Type 2 diabetes mellitus without complication ([facility] [address] 7/35/5342    Varicose vein of leg        PAST SURGICAL HISTORY:     History reviewed. No pertinent surgical history. SOCIAL HISTORY:     Social History     Socioeconomic History    Marital status:      Spouse name: None    Number of children: None    Years of education: None    Highest education level: None   Occupational History    None   Tobacco Use    Smoking status: Never Smoker    Smokeless tobacco: Never Used   Substance and Sexual Activity    Alcohol use: No     Alcohol/week: 0.0 standard drinks    Drug use: No    Sexual activity: Yes     Partners: Male   Other Topics Concern    None   Social History Narrative    None     Social Determinants of Health     Financial Resource Strain:     Difficulty of Paying Living Expenses: Not on file   Food Insecurity:     Worried About Running Out of Food in the Last Year: Not on file    Bhavana of Food in the Last Year: Not on file   Transportation Needs:     Lack of Transportation (Medical): Not on file    Lack of Transportation (Non-Medical):  Not on file   Physical Activity:     Days of Exercise per Week: Not on file    Minutes of Exercise per Session: Not on file   Stress:     Feeling of Stress : Not on file   Social Connections:     Frequency of Communication with Friends and Family: Not on file    Frequency of Social Gatherings with Friends and Family: Not on file    Attends Buddhist Services: Not on file    Active Member of Clubs or Organizations: Not on file    Attends Club or Organization Meetings: Not on file    Marital Status: Not on file   Intimate Partner Violence:     Fear of Current or Ex-Partner: Not on file    Emotionally Abused: Not on file    Physically Abused: Not on file    Sexually Abused: Not on file   Housing Stability:     Unable to Pay for Housing in the Last Year: Not on file    Number of Places Lived in the Last Year: Not on file    Unstable Housing in the Last Year: Not on file       FAMILY HISTORY:     Family History   Problem Relation Age of Onset    High Blood Pressure Mother     Hypertension Mother     High Cholesterol Mother    Betzaida Poet Mother     Thyroid Disease Mother     Cancer Father     Alcohol Abuse Father     High Blood Pressure Sister     High Cholesterol Sister     Thyroid Disease Sister     Heart Disease Brother     Alcohol Abuse Brother        ALLERGIES:     No Known Allergies    MEDICATIONS:     Current Facility-Administered Medications   Medication Dose Route Frequency Provider Last Rate Last Admin    ferrous sulfate EC tablet 324 mg  324 mg Oral Every Other Day Frederic Wagoner MD   324 mg at 05/31/22 1115    vancomycin 1000 mg IVPB in 250 mL D5W addavial  1,000 mg IntraVENous Q12H Santosh Huston MD   Stopped at 05/31/22 1330    insulin glargine (LANTUS) injection vial 18 Units  18 Units SubCUTAneous Nightly Santosh Patel MD        vancomycin (VANCOCIN) intermittent dosing (placeholder)   Other RX Placeholder Ousmane Lindsey MD        traMADol Dorann Sharon) tablet 25 mg  25 mg Oral Q6H PRN Santosh Patel MD   25 mg at 05/30/22 1020    oxyCODONE (ROXICODONE) immediate release tablet 2.5 mg  2.5 mg Oral Q3H PRN Rocio Marmolejo MD   2.5 mg at 05/31/22 1115    cefepime (MAXIPIME) 2000 mg IVPB minibag  2,000 mg IntraVENous Q12H Ousmane Lindsey MD Stopped at 05/31/22 1034    metronidazole (FLAGYL) 500 mg in 0.9% NaCl 100 mL IVPB premix  500 mg IntraVENous Q8H Cassidy Chaudhry MD   Stopped at 05/31/22 1210    rosuvastatin (CRESTOR) tablet 5 mg  5 mg Oral Nightly Cassidy Chaudhry MD   5 mg at 05/30/22 2044    gabapentin (NEURONTIN) capsule 300 mg  300 mg Oral QAM Cassidy Chaudhry MD   300 mg at 05/31/22 3839    And    gabapentin (NEURONTIN) capsule 300 mg  300 mg Oral Lunch Cassidy Chaudhry MD   300 mg at 05/31/22 1435    And    gabapentin (NEURONTIN) capsule 300 mg  300 mg Oral QPM Cassidy Chaudhry MD   300 mg at 05/30/22 1652    And    gabapentin (NEURONTIN) capsule 900 mg  900 mg Oral Nightly Cassidy Chaudhry MD   900 mg at 05/30/22 2045    insulin lispro (HUMALOG) injection vial 0-12 Units  0-12 Units SubCUTAneous TID WC Cassidy Chaudhry MD        insulin lispro (HUMALOG) injection vial 0-6 Units  0-6 Units SubCUTAneous Nightly Cassidy Chaudhry MD        glucose chewable tablet 16 g  4 tablet Oral PRN Cassidy Chaudhry MD        dextrose bolus 10% 125 mL  125 mL IntraVENous PRN Cassidy Chaudhry MD   Stopped at 05/31/22 0548    Or    dextrose bolus 10% 250 mL  250 mL IntraVENous PRN Cassidy Chaudhry MD        glucagon (rDNA) injection 1 mg  1 mg IntraMUSCular PRN Cassidy Chaudhry MD        dextrose 5 % solution  100 mL/hr IntraVENous PRN Cassidy Chaudhry MD        sodium chloride flush 0.9 % injection 10 mL  10 mL IntraVENous 2 times per day Cassidy Chaudhry MD   10 mL at 05/31/22 0954    sodium chloride flush 0.9 % injection 10 mL  10 mL IntraVENous PRN Cassidy Chaudhry MD        0.9 % sodium chloride infusion   IntraVENous PRN Cassidy Chaudhry MD        ondansetron [facility]) injection 4 mg  4 mg IntraVENous Q4H PRN Cassidy Chaudhry MD        polyethylene glycol [facility]) packet 17 g  17 g Oral Daily PRN Cassidy Chaudhry MD        acetaminophen (TYLENOL) tablet 650 mg  650 mg Oral Q4H PRN Cassidy Chaudhry MD   650 mg at 05/29/22 2310    Or  acetaminophen (TYLENOL) suppository 650 mg  650 mg Rectal Q4H PRN Alec Pa MD        0.9 % sodium chloride infusion   IntraVENous Continuous Alec Pa MD   Stopped at 05/31/22 1630       REVIEW OF SYSTEMS:       Constitutional:  no fever, no chills, no night sweats  Eyes:  No impairment or change in vision  ENT / Mouth:  No pain, no ulceration, no bleeding, no change in voice, no hearing loss  Cardiovascular:  No chest pain, no palpitations, no new edema  Respiratory:  No pain with breathing, no hemoptysis, no change to breathing  Gastrointestinal:  No pain, no cramping, no change in swallowing, no nausea, no altered bowel habits  Urinary:  No pain, no bleeding, no change in continence  Musculoskeletal:  No redness, no edema, no weakness  Skin:  No pruritus, no rash, no change to nodules or lesions  Neurologic:  No headache, no change in mental status, no speech changes  Psychiatric:  No change in concentration, no change to affect or mood  Endocrine:  No hot flashes, no increased thirst  Hematologic: No petechiae, no ecchymosis, no bleeding  Lymphatic:  No lymphadenopathy, no lymphedema  Allergy / Immunologic:  No rash, no nonhealing wounds    PHYSICAL EXAM:       Vitals:    05/31/22 1300   BP:    Pulse: 82   Resp: 17   Temp: 99 °F (37.2 °C)   SpO2:        ECOG PERFORMANCE STATUS: 2  CONSTITUTIONAL: Awake, alert, cooperative, no apparent distress  EYES: Sclera clear, conjunctiva normal, no obvious abnormality  HEAD: Normocephalic, atraumatic  ENT: Mucous membranes moist, no intraoral abnormalities noted  NECK: Symmetrical, no visible masses  HEMATOLOGIC/LYMPHATIC: No grossly abnormal lymphadenopathy  LUNGS: No increased work of breathing, no stridor  CARDIOVASCULAR: Regular rate  ABDOMEN: Non distended  MUSCULOSKELETAL: Normal strength, no impaired range of motion  NEUROLOGIC: Alert, oriented, gross motor skills intact  SKIN: Normal color, texture, and turgor of the exposed skin, without jaundice, bruising or bleeding  EXTREMITIES: Without cyanosis, clubbing, edema, or asymmetry  PELVIC: To be performed at time of radiation planning    LABS:     CBC:  Lab Results   Component Value Date    WBC 21.8 05/31/2022    HGB 8.5 05/31/2022    HCT 27.6 05/31/2022    MCV 82.7 05/31/2022     05/31/2022       BMP:   Lab Results   Component Value Date     05/31/2022    K 3.1 05/31/2022     05/31/2022    CO2 25 05/31/2022    BUN 11 05/31/2022    CREATININE 0.5 05/31/2022    GLUCOSE 61 05/31/2022    CALCIUM 10.4 05/31/2022       HEPATIC FUNCTION PANEL:   Lab Results   Component Value Date    ALKPHOS 80 05/31/2022    ALT 42 05/31/2022    AST 39 05/31/2022    PROT 4.8 05/31/2022    BILITOT <0.2 05/31/2022    LABALBU 2.1 05/31/2022       No results found for: INR  No results found for: PTINR    TUMOR MARKERS:    No results found for: PSA, CEA, , FD2510,     IMAGING:     I have reviewed all relevant imaging in detail, and discussed findings with Ms. Reza. CT Head WO Contrast    Result Date: 5/29/2022  EXAMINATION: CT OF THE HEAD WITHOUT CONTRAST  5/29/2022 5:38 pm TECHNIQUE: CT of the head was performed without the administration of intravenous contrast. Automated exposure control, iterative reconstruction, and/or weight based adjustment of the mA/kV was utilized to reduce the radiation dose to as low as reasonably achievable. COMPARISON: None. HISTORY: ORDERING SYSTEM PROVIDED HISTORY: Syncope, frequent falls TECHNOLOGIST PROVIDED HISTORY: Reason for exam:->Syncope, frequent falls Has a \"code stroke\" or \"stroke alert\" been called? ->No Decision Support Exception - unselect if not a suspected or confirmed emergency medical condition->Emergency Medical Condition (MA) FINDINGS: BRAIN/VENTRICLES: There is no acute intracranial hemorrhage, mass effect or midline shift. No abnormal extra-axial fluid collection.   The gray-white differentiation is maintained without evidence of an acute infarct. There is no evidence of hydrocephalus. ORBITS: The visualized portion of the orbits demonstrate no acute abnormality. SINUSES: The visualized paranasal sinuses and mastoid air cells demonstrate no acute abnormality. SOFT TISSUES/SKULL:  No acute abnormality of the visualized skull or soft tissues. No acute intracranial abnormality. CT Cervical Spine WO Contrast    Result Date: 5/29/2022  EXAMINATION: CT OF THE CERVICAL SPINE WITHOUT CONTRAST 5/29/2022 5:39 pm TECHNIQUE: CT of the cervical spine was performed without the administration of intravenous contrast. Multiplanar reformatted images are provided for review. Automated exposure control, iterative reconstruction, and/or weight based adjustment of the mA/kV was utilized to reduce the radiation dose to as low as reasonably achievable. COMPARISON: None. HISTORY: ORDERING SYSTEM PROVIDED HISTORY: Syncope TECHNOLOGIST PROVIDED HISTORY: Reason for exam:->Syncope Decision Support Exception - unselect if not a suspected or confirmed emergency medical condition->Emergency Medical Condition (MA) FINDINGS: BONES/ALIGNMENT: There is no acute fracture or traumatic malalignment. DEGENERATIVE CHANGES: Mild, multilevel degenerative changes. SOFT TISSUES: There is no prevertebral soft tissue swelling. 1 cm right thyroid nodule. 1. No acute abnormality of the cervical spine. 2. Multilevel degenerative changes. RECOMMENDATIONS: 1 cm incidental right thyroid nodule. No follow-up imaging is recommended. Reference: J Am Riana Radiol. 2015 Feb;12(2): 143-50     XR CHEST PORTABLE    Result Date: 5/29/2022  EXAMINATION: ONE XRAY VIEW OF THE CHEST 5/29/2022 5:28 pm COMPARISON: 11/19/2015 HISTORY: ORDERING SYSTEM PROVIDED HISTORY: Syncope TECHNOLOGIST PROVIDED HISTORY: Reason for exam:->Syncope Reason for Exam: Extremity Weakness; Wound Check; Fatigue FINDINGS: Cardiomediastinal silhouette is normal in size. There is no pleural effusion or pneumothorax.   The lungs are hyperinflated. No pulmonary consolidation. There is no acute osseous abnormality. No acute cardiopulmonary abnormality. CT CHEST ABDOMEN PELVIS W CONTRAST    Result Date: 5/29/2022  EXAMINATION: CT OF THE CHEST, ABDOMEN, AND PELVIS WITH CONTRAST 5/29/2022 6:41 pm TECHNIQUE: CT of the chest, abdomen and pelvis was performed with the administration of intravenous contrast. Multiplanar reformatted images are provided for review. Automated exposure control, iterative reconstruction, and/or weight based adjustment of the mA/kV was utilized to reduce the radiation dose to as low as reasonably achievable. COMPARISON: 11/20/2015 HISTORY: ORDERING SYSTEM PROVIDED HISTORY: Large, necrotic appearing mass/swelling to vulva/rectal region; pt states has been present for \"years\" TECHNOLOGIST PROVIDED HISTORY: Reason for exam:->Large, necrotic appearing mass/swelling to vulva/rectal region; pt states has been present for \"years\" Additional Contrast?->None FINDINGS: Chest: Mediastinum: Thyroid is enlarged and heterogeneous. Heart size is normal without pericardial effusion. Thoracic aorta and main pulmonary artery are normal in caliber. Coronary artery atherosclerosis. No mediastinal lymphadenopathy. Lungs/pleura: There is no pleural effusion. There is no pneumothorax. There is no pulmonary consolidation. Soft Tissues/Bones: Multilevel degenerative changes of the thoracic spine. Abdomen/Pelvis: Organs: No acute abnormality within the liver, spleen, gallbladder, pancreas, or adrenal glands. No nephrolithiasis or hydronephrosis. GI/Bowel: Stomach is nondistended. The small bowel is nondilated. The colon is nondilated. Pelvis: Bladder is partially distended without vesicular stone. Uterus is present. Endometrial lesions are partially visualized, most consistent with fibroids. Peritoneum/Retroperitoneum: No ascites or pneumoperitoneum. Atherosclerosis of the nondilated abdominal aorta.   There is a fat containing ventral abdominal wall hernia. Bones/Soft Tissues: Multilevel degenerative changes of the lumbar spine. There is a heterogeneously enhancing mass in the right vulva/perineum measuring up to 14.1 x 8.8 x 7.5 cm. There are foci of gas within this lesion, suggesting ulceration/necrosis. There are mildly enlarged bilateral inguinal lymph nodes. 1. No acute intrathoracic abnormality. 2. Large heterogeneously enhancing mass in the right vulva/perineum is concerning for a malignant process. There is also gas within this lesion, suggesting ulceration. Clinical correlation and tissue sampling are recommended for further evaluation. 3. Bilateral inguinal lymphadenopathy. 4. Enlarged, heterogeneous thyroid. Ultrasound is recommended. RECOMMENDATIONS: Incidental heterogeneous and enlarged thyroid. Recommend thyroid US. Reference: J Am Riana Radiol. 2015 Feb;12(2): 143-50        ASSESSMENT:     Ms. Trista Elizabeth is a 77 y.o. 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 She presented with enlarging perineal mass, pain, bleeding, weight loss, and generalized weakness. CT/exam demonstrated a very large heterogeneous vulvar lesion measuring up to 14.1 cm with mildly enlarged bilateral inguinal lymph nodes, without overt evidence of metastatic disease otherwise. Pathology has not yet been obtained. PLAN:        - She will still need biopsy for pathologic confirmation, although this is very likely a squamous cell carcinoma of the vulva. - I would also like to obtain a PET/CT once outpatient, both for staging and radiotherapy planning.  - Reviewed management strategies at length. Discussed that long term cure may be difficult due to advanced nature of malignancy. More likely would be gaining some degree of disease control and possibly a period of remission which could be life prolonging.   This would also potentially alleviate some of her tumor related morbidity/pain and possibly prevent other symptoms such as bowel obstruction.    - Discussed conventionally fractionated chemoradiation would likely afford the best long term disease control. Reviewed possibility of more abbreviated palliative courses of radiation for symptom control purposes that would be less durable. - She was certain that she wanted to pursue reasonably aggressive treatment and was agreeable to chemoradiation.  - I had an in depth discussion on the rationale, benefits, risks, side effects, and alternatives of radiation treatment and she has elected to proceed. Our discussion included, but was not limited to, the risk of fatigue, dermatitis which can be severe, nausea/vomiting, diarrhea, cytopenias, abdominal cramping/pain, long term changes in bowel and bladder function including chronic incontinence of either, bowel scarring and obstruction, fistula formation, sexual dysfunction, soft tissue fibrosis, pelvic bone weakening, nerve damage, and second malignancy. - I have recommended a total dose of 5940 cGy to the areas of gross tumor and lower doses to the at risk bilateral inguinopelvic nodes, to be delivered in 33 treatments over the course of 6-7 weeks using 2 sequential plans. IMRT is recommended due to the large complex PTV and desire to spare adjacent organs at risk including the small bowel, bladder, bone marrow, and femoral heads. Daily CBCT imaging will be used in order to minimize PTV size and spare toxicity. - Concurrent radiosensitizing chemotherapy likely in the form weekly cisplatin (per discussion with Dr. Suzan Haynes) will be given and initiated simultaneously with radiation.      - We will simulate her this Thursday, either as an inpatient or outpatient depending on disposition, with plans to begin treatment shortly thereafter. Thank you for the opportunity to see this patient and participate in her care. Aysha Brito M.D. Radiation Oncology      [email or web address]  [phone]

## 2022-05-31 NOTE — PROGRESS NOTES
Occupational Therapy  Facility/Department: [address] PROGRESSIVE CARE  Occupational Therapy Initial Assessment    Name: Shahbaz Coto  : 1955  MRN: [id number]  Date of Service: 2022    Discharge Recommendations:  Home with assist PRN  OT Equipment Recommendations  Equipment Needed: No     Shahbaz Coto scored a 19/24 on the AM-PAC ADL Inpatient form. At this time, no further OT is recommended upon discharge. Recommend patient returns to prior setting with prior services. Patient Diagnosis(es): The primary encounter diagnosis was Severe sepsis ([facility] [address] Diagnoses of Urinary tract infection without hematuria, site unspecified, Syncope and collapse, Hypercalcemia, Vulvar mass, and Generalized weakness were also pertinent to this visit. Past Medical History:  has a past medical history of Congenital prolapsed rectum, Displaced bladder, Enlarged, heterogeneous thyroid, Hernia, Mixed hyperlipidemia, Type 2 diabetes mellitus without complication ([facility] [address] and Varicose vein of leg. Past Surgical History:  has no past surgical history on file. Assessment   Performance deficits / Impairments: Decreased functional mobility ; Decreased safe awareness;Decreased balance;Decreased ADL status; Decreased endurance;Decreased high-level IADLs;Decreased strength  Assessment: 78 y/o female admitted 2022 with UTI, Perineal/vulvar mass, and sepsis. PTA pt lives at home with spouse and was independent with ADLs and functional mobility. Today, pt required CGA for transfers and functional mobility around room and in sidhu with RW. Pt does a quick transition from supine > standing to minimize sitting d/t pain. Pt complete grooming tasks seated at sink with CGA for balance. Pt is functioning below baseline and benefit from skilled therapy. Anticipate pt will be safe to return home with family support at discharge.   Prognosis: Fair  Decision Making: Medium Complexity  REQUIRES OT FOLLOW-UP: Yes  Activity Tolerance  Activity Tolerance: Patient limited by pain  Activity Tolerance Comments: significant pain in pelvic area 2/2 mass        Plan   Plan  Times per Week: 3-5  Current Treatment Recommendations: Strengthening,Balance training,Functional mobility training,Gait training,Self-Care / ADL     Restrictions  Restrictions/Precautions  Restrictions/Precautions: Fall Risk  Position Activity Restriction  Other position/activity restrictions: difficulty sitting due to Perineal/vulvar mass    Subjective   General  Chart Reviewed: Yes  Patient assessed for rehabilitation services?: Yes  Additional Pertinent Hx: Per H&P: \"Pt is an 77y.o. year-old female with a history of hyperlipidemia and diabetes mellitus. She presents to the emergency room for evaluation following a 2 to 3-week history of various somatic symptoms which include generalized weakness of her extremities, increasing fatigue, a decreased appetite and a mass in her vulva/perineal area. She reports falling last Friday and hitting her head in her bathroom floor. She did not have loss of consciousness. A CT of her head and a CT of her cervical spine had no abnormal findings. A CT of her chest, abdomen and pelvis revealed a, \"Large heterogeneously enhancing mass in the right vulva/perineum is concerning for a malignant process. There is also gas within this lesion, suggesting ulceration. \"  GYN on-call was consulted who came and evaluated the patient is highly concerned for vulvar cancer. GYN oncology was also consulted who will evaluate the patient in the morning. Further testing revealed a urinary tract infection. Patient meets criteria for severe sepsis. \"  Family / Caregiver Present: Yes  Referring Practitioner: Bhumi Londono MD  Subjective  Subjective: Pt seen bedside and agreeable to therapy with encouragement. Pt reporting significant pain but did not rate  General Comment  Comments: Per RN ok for therapy.      Social/Functional History  Social/Functional History  Lives With: Spouse  Type of Home: House  Home Layout: One level  Home Access:  (1 small step to enter)  Bathroom Shower/Tub: Tub/Shower unit  Bathroom Toilet: Handicap height  Home Equipment: david Zuluaga (RW is old and was her mother in laws)  Has the patient had two or more falls in the past year or any fall with injury in the past year?: Yes  ADL Assistance: Independent  Homemaking Assistance: Independent ( has been helping lately)  Homemaking Responsibilities: Yes  Ambulation Assistance: Independent (recently has been using an old RW due to increased pain and weakness)  Transfer Assistance: Independent       Objective   Vision: WFL  Hearing: Within functional limits          Safety Devices  Type of Devices: Call light within reach;Nurse notified; Left in bed;Gait belt        AROM: Within functional limits  Strength: Generally decreased, functional  Coordination: Within functional limits     ADL  Grooming: Contact guard assistance (CGA standing at sink to brush teeth)  Additional Comments: Anticipate pt will be min A for LB bathing/dressing and toileting, SBA for UB bathing/dressing based on balance and endurance observed     Activity Tolerance  Activity Tolerance: Patient limited by pain     Bed mobility  Supine to Sit: Contact guard assistance  Sit to Supine: Minimal assistance  Bed Mobility Comments: increased assist due to pain     Transfers  Sit to stand: Contact guard assistance  Stand to sit: Contact guard assistance  Transfer Comments: to/from RW- quick transition from supine > standing to minimal sitting      Pt stood at sink with CGA ~ 3-5 min to brush teeth. Pt completed functional mobility around room/bathroom and in sidhu with RW and CGA. Cognition  Overall Cognitive Status: WFL     Education Given To: Family; Patient  Education Provided: Role of Therapy;Plan of Care;Energy Conservation  Education Method: Demonstration;Verbal  Barriers to Learning: None  Education Outcome: Verbalized understanding     LUE AROM (degrees)  LUE AROM : WFL  Left Hand AROM (degrees)  Left Hand AROM: WFL  RUE AROM (degrees)  RUE AROM : WFL  Right Hand AROM (degrees)  Right Hand AROM: [facility]    AM-PAC Score  AM-PAC Inpatient Daily Activity Raw Score: 19 (05/31/22 0906)  AM-PAC Inpatient ADL T-Scale Score : 40.22 (05/31/22 0906)  ADL Inpatient CMS 0-100% Score: 42.8 (05/31/22 0906)  ADL Inpatient CMS G-Code Modifier : CK (05/31/22 0906)    Goals  Short Term Goals  Time Frame for Short term goals: Prior to DC: Short Term Goal 1: Pt will complete ADL transfers/mobility with supervision  Short Term Goal 2: Pt will complete toileting with supervision  Short Term Goal 3: Pt will tolerate standing > 5 min for functional task with supervision  Short Term Goal 4: Pt will complete LB Dressing with supervision  Patient Goals   Patient goals : to return home       Therapy Time   Individual Concurrent Group Co-treatment   Time In 0815         Time Out 0900         Minutes 45         Timed Code Treatment Minutes: 30 Minutes     This note to serve as OT d/c summary if pt is d/c-ed prior to next therapy session.     Omaira Manuel, OTR/L

## 2022-05-31 NOTE — PROGRESS NOTES
Hospitalist Progress Note      PCP: Phylicia Gao, APRN - CNP    Date of Admission: 5/29/2022    Subjective: No new complaints, denies abdominal pain nausea vomiting. No family member at bedside. Medications:  Reviewed    Infusion Medications    dextrose      sodium chloride      sodium chloride 100 mL/hr at 05/31/22 0214     Scheduled Medications    magnesium sulfate  2,000 mg IntraVENous Once    potassium chloride  10 mEq IntraVENous Q1H    vancomycin (VANCOCIN) intermittent dosing (placeholder)   Other RX Placeholder    vancomycin  1,000 mg IntraVENous Q18H    cefepime  2,000 mg IntraVENous Q12H    metroNIDAZOLE  500 mg IntraVENous Q8H    rosuvastatin  5 mg Oral Nightly    insulin glargine  22 Units SubCUTAneous BID    gabapentin  300 mg Oral QAM    And    gabapentin  300 mg Oral Lunch    And    gabapentin  300 mg Oral QPM    And    gabapentin  900 mg Oral Nightly    insulin lispro  0-12 Units SubCUTAneous TID WC    insulin lispro  0-6 Units SubCUTAneous Nightly    sodium chloride flush  10 mL IntraVENous 2 times per day     PRN Meds: traMADol, oxyCODONE, glucose, dextrose bolus **OR** dextrose bolus, glucagon (rDNA), dextrose, sodium chloride flush, sodium chloride, ondansetron, polyethylene glycol, acetaminophen **OR** acetaminophen      Intake/Output Summary (Last 24 hours) at 5/31/2022 0701  Last data filed at 5/31/2022 0214  Gross per 24 hour   Intake 1299.75 ml   Output 1300 ml   Net -0.25 ml       Physical Exam Performed:    BP (!) 102/47   Pulse 68   Temp 98.3 °F (36.8 °C) (Oral)   Resp 16   Ht 5' 1\" (1.549 m)   Wt 118 lb 9.7 oz (53.8 kg)   SpO2 95%   BMI 22.41 kg/m²     General appearance: No apparent distress  Neck: Supple  Respiratory:  Normal respiratory effort. Clear to auscultation, bilaterally without Rales/Wheezes/Rhonchi. Cardiovascular: Regular rate and rhythm with normal S1/S2 without murmurs, rubs or gallops.   Abdomen: Soft, non-tender  Musculoskeletal: No clubbing, cyanosis  Skin: Skin color, texture, turgor normal.  No rashes or lesions. Neurologic:  No drifting or weakness   Psychiatric: Alert and oriented  Capillary Refill: Brisk,3 seconds, normal   Peripheral Pulses: +2 palpable, equal bilaterally       Labs:   Recent Labs     05/29/22 1751 05/30/22 0351 05/31/22  0431   WBC 25.2* 24.0* 21.8*   HGB 11.0* 8.7* 8.5*   HCT 36.3 28.6* 27.6*   * 381 326     Recent Labs     05/29/22 1751 05/30/22  0351 05/31/22  0431    141 139   K 4.1 3.0* 3.1*    107 107   CO2 26 26 25   BUN 25* 18 11   CREATININE 0.8 0.5* 0.5*   CALCIUM 13.9* 10.7* 10.4     Recent Labs     05/29/22 1751 05/30/22 0351 05/31/22 0431   * 72* 39*   ALT 71* 53* 42*   BILITOT 0.4 <0.2 <0.2   ALKPHOS 132* 84 80     No results for input(s): INR in the last 72 hours. Recent Labs     05/29/22 1751 05/29/22  2304 05/30/22 0351   TROPONINI 0.02* <0.01 0.01       Urinalysis:      Lab Results   Component Value Date    NITRU Negative 05/29/2022    WBCUA 289 05/29/2022    BACTERIA 4+ 05/29/2022    RBCUA 169 05/29/2022    BLOODU LARGE 05/29/2022    SPECGRAV 1.026 05/29/2022    GLUCOSEU Negative 05/29/2022       Radiology:  CT CHEST ABDOMEN PELVIS W CONTRAST   Final Result   1. No acute intrathoracic abnormality. 2. Large heterogeneously enhancing mass in the right vulva/perineum is   concerning for a malignant process. There is also gas within this lesion,   suggesting ulceration. Clinical correlation and tissue sampling are   recommended for further evaluation. 3. Bilateral inguinal lymphadenopathy. 4. Enlarged, heterogeneous thyroid. Ultrasound is recommended. RECOMMENDATIONS:   Incidental heterogeneous and enlarged thyroid. Recommend thyroid US. Reference: J Am Riana Radiol. 2015 Feb;12(2): 143-50         CT Cervical Spine WO Contrast   Final Result   1. No acute abnormality of the cervical spine.    2. Multilevel degenerative changes. RECOMMENDATIONS:   1 cm incidental right thyroid nodule. No follow-up imaging is recommended. Reference: J Am Riana Radiol. 2015 Feb;12(2): 143-50         CT Head WO Contrast   Final Result   No acute intracranial abnormality. XR CHEST PORTABLE   Final Result   No acute cardiopulmonary abnormality. Assessment/Plan:    Active Hospital Problems    Diagnosis     Acute cystitis with hematuria [N30.01]      Priority: Medium    Severe sepsis ([facility] [address] [A41.9, R65.20]      Priority: Medium    Septic shock ([address] [A41.9, R65.21]      Priority: Medium    Tachycardia [R00.0]      Priority: Medium    Neutrophilic leukocytosis [C43.0]      Priority: Medium    Elevated lactic acid level [R79.89]      Priority: Medium    Elevated liver function tests [R79.89]      Priority: Medium    Generalized weakness [R53.1]      Priority: Medium    Fatigue [R53.83]      Priority: Medium    Vulvar mass [N90.89]      Priority: Medium    Enlarged, heterogeneous thyroid [E04.9]      Priority: Medium    Mixed hyperlipidemia [E78.2]     DMII (diabetes mellitus, type 2) ([facility] [address] [E11.9]      1.  UTI, patient was started on cefepime, pending cultures. Also vancomycin and Flagyl added empirically. 2.  Perineal/vulvar mass, gynecology consulted, history of venous, cannot completely exclude infection or malignancy, patient already on vancomycin Flagyl and cefepime. Gynecology consulted. Plan for potential biopsy and radiation therapy. 3.  Sepsis, severe, IV fluid given, IV fluid maintenance. Monitor closely  4. Elevated LFTs, monitor for now  5. Generalized weakness, due to above, PT OT  6. Large thyroid, heterogeneous, possible nodules, will need an ultrasound at one-point. Likely as outpatient. 7.  Diabetes mellitus, sliding scale  8. Elevated lactic acid on admission, improved  9. Hypercalcemia on admission, received IV fluid, improving, suspecting malignant etiology.   10.  Hypokalemia, will replace  11. Anemia, no obvious bleeding, suspecting due to vulvar mass which is highly suspicious for malignancy along with necrotic tissue. 12.  Hypomagnesemia being replaced with IV supplement    Diet: ADULT DIET;  Regular; 5 carb choices (75 gm/meal)  Code Status: Full Code      Maddy Kim MD

## 2022-05-31 NOTE — PROGRESS NOTES
[facility] GYN ONC Progress Note  Jennifer Reza     SUBJECTIVE:  No acute events overnight. Reports pain has been better controlled. Reports ongoing bleeding from mass, unchanged from baseline. States pad gets saturated throughout the day. Voiding spontaneously; reports continence. +loose stools. OBJECTIVE:  Infusions:   dextrose      sodium chloride      sodium chloride 100 mL/hr at 05/31/22 0214       I/O:I/O last 3 completed shifts: In: 3243.9 [P.O.:360; I.V.:1282.6; IV Piggyback:1601.3]  Out: 1500 [Urine:1500]           Wt Readings from Last 1 Encounters:   05/31/22 118 lb 9.7 oz (53.8 kg)        LABS:    Recent Labs     05/30/22 0351 05/31/22 0431   WBC 24.0* 21.8*   HGB 8.7* 8.5*   HCT 28.6* 27.6*   MCV 83.0 82.7    326        Recent Labs     05/30/22 0351 05/31/22 0431    139   K 3.0* 3.1*    107   CO2 26 25   BUN 18 11   CREATININE 0.5* 0.5*        Recent Labs     05/30/22  0351 05/31/22 0431   AST 72* 39*   ALT 53* 42*   BILITOT <0.2 <0.2   ALKPHOS 84 80      No results for input(s): LIPASE, AMYLASE in the last 72 hours. Recent Labs     05/30/22  0351 05/31/22  0431   PROT 5.0* 4.8*        Recent Labs     05/29/22  2304 05/30/22 0351   TROPONINI <0.01 0.01             Exam:BP (!) 102/57   Pulse 71   Temp 98.2 °F (36.8 °C) (Oral)   Resp 16   Ht 5' 1\" (1.549 m)   Wt 118 lb 9.7 oz (53.8 kg)   SpO2 92%   BMI 22.41 kg/m²   General appearance: alert, appears stated age and cooperative  Lungs: clear to auscultation bilaterally  Heart: regular rate and rhythm, S1, S2   Abdomen: soft, minimal TTP  Pelvic: Internal exam deferred. Bilateral fungating mass. Foul smelling. Dark brown discharge vs blood streaked on pad. No evidence of bright red/active heavy bleeding. ASSESSMENT/PLAN:   Pt. is a 77 y.o. female a/w sepsis.  Gyn Onc consulted for vulvar mass    #Vulvar Mass  - Imaging and exam findings favor primary vulvar cancer  - Plan for biopsy sometime later this week if remains inpatient  - Not a good surgical candidate  - Oncology and Radiation Oncology consulted - plan for chemoradiation    #Sepsis  - Management per primary team  - Etiology 2/2 UTI vs necrotic mass   - Blood Cx NGTD  - UCx pending   - Currently on triple antibiotic therapy with Cefepime, Vanc, and Flagyl    Angel Higgins, DO  [id number]

## 2022-05-31 NOTE — PROGRESS NOTES
Clinical Pharmacy Note  Vancomycin Consult    Ovi Adame is a 77 y.o. female ordered Vancomycin for UTI and possible infected mass; consult received from Dr. Sincere Odell to manage therapy. Also receiving cefepime and Flagyl. Patient Active Problem List   Diagnosis    DMII (diabetes mellitus, type 2) ([address]    Duodenal ulcer    Pain in both lower extremities    Varicosities of leg    Elevated blood pressure reading    Mixed hyperlipidemia    Type 2 diabetes mellitus with diabetic neuropathy    Acute cystitis with hematuria    Severe sepsis (HCC)    Septic shock (HCC)    Tachycardia    Neutrophilic leukocytosis    Elevated lactic acid level    Elevated liver function tests    Generalized weakness    Fatigue    Vulvar mass    Enlarged, heterogeneous thyroid       Allergies:  Patient has no known allergies. Temp max:  Temp (24hrs), Av.2 °F (36.8 °C), Min:97.8 °F (36.6 °C), Max:98.8 °F (37.1 °C)      Recent Labs     22  1751 22  0351 22  0431   WBC 25.2* 24.0* 21.8*       Recent Labs     22  1751 22  0351 22  0431   BUN 25* 18 11   CREATININE 0.8 0.5* 0.5*         Intake/Output Summary (Last 24 hours) at 2022 0816  Last data filed at 2022 0214  Gross per 24 hour   Intake 1299.75 ml   Output 1000 ml   Net 299.75 ml       Culture Results:  Pending    Ht Readings from Last 1 Encounters:   22 5' 1\" (1.549 m)        Wt Readings from Last 1 Encounters:   22 118 lb 9.7 oz (53.8 kg)         Estimated Creatinine Clearance: 84 mL/min (A) (based on SCr of 0.5 mg/dL (L)). Assessment/Plan:    Vanco day # 2  Vanco level with am labs today = 11.1 mg/L  Predicted AUC on current regimen of 1000 mg Q18H = 353    New regimen: 1000 mg IV Q12H  Predicted AUC = 525    Next vanco trough on 22 at 0800    Thank you for the consult.      Malissa Cotton PharmD.  2022  8:19 AM

## 2022-05-31 NOTE — PROGRESS NOTES
Physical Therapy  Facility/Department: [facility]  Physical Therapy Treatment Note    Name: Peg Odell  : 1955  MRN: [id number]  Date of Service: 2022    Discharge Recommendations:  24 hour supervision or assist,Home with Home health PT   PT Equipment Recommendations  Equipment Needed: Yes  Mobility Devices: Jose Juan Rodrigez: Starr Odell scored a 18/24 on the AM-PAC short mobility form. Current research shows that an AM-PAC score of 18 or greater is typically associated with a discharge to the patient's home setting. Based on the patient's AM-PAC score and their current functional mobility deficits, it is recommended that the patient have 2-3 sessions per week of Physical Therapy at d/c to increase the patient's independence. At this time, this patient demonstrates the endurance and safety to discharge home with home PT and a follow up treatment frequency of 2-3x/wk. Please see assessment section for further patient specific details. If patient discharges prior to next session this note will serve as a discharge summary. Please see below for the latest assessment towards goals. Patient Diagnosis(es): The primary encounter diagnosis was Severe sepsis ([address] Diagnoses of Urinary tract infection without hematuria, site unspecified, Syncope and collapse, Hypercalcemia, Vulvar mass, and Generalized weakness were also pertinent to this visit. Past Medical History:  has a past medical history of Congenital prolapsed rectum, Displaced bladder, Enlarged, heterogeneous thyroid, Hernia, Mixed hyperlipidemia, Type 2 diabetes mellitus without complication ([address] and Varicose vein of leg. Past Surgical History:  has no past surgical history on file.     Assessment   Body Structures, Functions, Activity Limitations Requiring Skilled Therapeutic Intervention: Decreased functional mobility   Assessment: pt is a 78 yo female who was adm to hosp with 2 to 3-week history of various somatic symptoms which include generalized weakness of her extremities, increasing fatigue, a decreased appetite and a mass in her vulva/perineal area. She reports falling last Friday and hitting her head in her bathroom floor. She did not have loss of consciousness. A CT of her head and a CT of her cervical spine had no abnormal findings. A CT of her chest, abdomen and pelvis revealed a, \"Large heterogeneously enhancing mass in the right vulva/perineum is concerning for a malignant process. There is also gas within this lesion, suggesting ulceration. \" GYN on-call was consulted who came and evaluated the patient is highly concerned for vulvar cancer. Pt also found to have a UTI and sepsis; Pt currently needing CGA for mobility tasks with RW; Anticipate pt will be able to return home at discharge with assist from family and home PT  Therapy Prognosis: Fair  Decision Making: Medium Complexity  Barriers to Learning: none  Requires PT Follow-Up: Yes  Activity Tolerance  Activity Tolerance: Patient limited by pain     Plan   Plan  Plan: 3-5 times per week  Current Treatment Recommendations: Functional mobility training,Endurance training,Transfer training,Balance training,Gait training,Strengthening  Safety Devices  Type of Devices: Call light within reach,Nurse notified,Left in bed,Gait belt     Restrictions  Restrictions/Precautions  Restrictions/Precautions: Fall Risk  Position Activity Restriction  Other position/activity restrictions: difficulty sitting due to Perineal/vulvar mass     Subjective   General  Chart Reviewed: Yes  Patient assessed for rehabilitation services?: Yes  Additional Pertinent Hx: per Dr Cam Gregg note: \" Pt is an 77y.o. year-old female with a history of hyperlipidemia and diabetes mellitus.   She presents to the emergency room for evaluation following a 2 to 3-week history of various somatic symptoms which include generalized weakness of her extremities, increasing fatigue, a decreased appetite and a mass in her vulva/perineal area. She reports falling last Friday and hitting her head in her bathroom floor. She did not have loss of consciousness. A CT of her head and a CT of her cervical spine had no abnormal findings. A CT of her chest, abdomen and pelvis revealed a, \"Large heterogeneously enhancing mass in the right vulva/perineum is concerning for a malignant process. There is also gas within this lesion, suggesting ulceration. \"  GYN on-call was consulted who came and evaluated the patient is highly concerned for vulvar cancer. GYN oncology was also consulted who will evaluate the patient in the morning. Further testing revealed a urinary tract infection. Patient meets criteria for severe sepsis. She will be admitted for further evaluation and treatment. \"  Response To Previous Treatment: Patient with no complaints from previous session.   Family / Caregiver Present: Yes (pt's sister in room)  Referring Practitioner: Dr Evy Mcnally  Referral Date : 05/29/22  Follows Commands: Within Functional Limits  Subjective  Subjective: pt unable to sit up in chair due to pain from peritoneal mass         Social/Functional History  Social/Functional History  Lives With: Spouse  Type of Home: House  Home Layout: One level  Home Access:  (1 small step to enter)  Bathroom Shower/Tub: Tub/Shower unit  Bathroom Toilet: Handicap height  Home Equipment: Rubie Mcardle, rolling (RW is old and was her mother in laws)  Has the patient had two or more falls in the past year or any fall with injury in the past year?: Yes  ADL Assistance: Independent  Homemaking Assistance: Independent ( has been helping lately)  Homemaking Responsibilities: Yes  Ambulation Assistance: Independent (recently has been using an old RW due to increased pain and weakness)  Transfer Assistance: Independent  Vision/Hearing  Hearing: Within functional limits (for purpose of eval)    Cognition   Orientation  Overall Orientation Status: Within Normal Limits  Cognition  Overall Cognitive Status: WNL     Objective   Heart Rate: 71  Heart Rate Source: Monitor  BP: (!) 102/57  BP Location: Right upper arm  BP Method: Automatic  Patient Position: Semi fowlers  MAP (Calculated): 72  Resp: 16  SpO2: 92 %  O2 Device: None (Room air)                             Bed mobility  Supine to Sit: Contact guard assistance  Sit to Supine: Minimal assistance  Transfers  Sit to Stand: Contact guard assistance  Stand to sit: Contact guard assistance  Ambulation  Surface: level tile  Device: Rolling Walker  Assistance: Contact guard assistance  Quality of Gait: slow pace; no LOB  Distance: 10' stood in bathroom to brush her teeth then amb approx 100'  Comments: assisted for positioning sidelying in bed and set up for breakfast tray     Balance  Sitting - Static: Good  Sitting - Dynamic: Good  Standing - Static: Fair;+ (with walker)  Standing - Dynamic: Fair;+ (with walker)           OutComes Score                                                  AM-PAC Score  AM-PAC Inpatient Mobility Raw Score : 18 (05/31/22 0905)  AM-PAC Inpatient T-Scale Score : 43.63 (05/31/22 0905)  Mobility Inpatient CMS 0-100% Score: 46.58 (05/31/22 0905)  Mobility Inpatient CMS G-Code Modifier : CK (05/31/22 0905)          Goals  Long Term Goals  Time Frame for Long term goals : by discharge  Long term goal 1: bed mob min A- met; new goal: MI  Long term goal 2: transfers CGA- met new goal: MI  Long term goal 3: amb 48' with LRAD CGA - met; new goal 100-200' with or without AD SBA  Patient Goals   Patient goals : to get home       Education  Patient Education  Education Given To: Patient  Education Provided Comments: reviewed call light and encouraged pt to get up and walk with nursing  Education Method: Verbal  Education Outcome: Verbalized understanding      Therapy Time   Individual Concurrent Group Co-treatment   Time In 0815         Time Out 0904         Minutes 49                 CHELSEY ORDOÑEZ ULM, PT    Electronically signed by CHELSEY GRIDER PT on 5/31/2022 at 9:05 AM

## 2022-05-31 NOTE — PROGRESS NOTES
[facility]  Hypoglycemia Event and Prevention Plan      NAME: Anderson Nice  MEDICAL RECORD NUMBER:  [id number]  AGE: 77 y.o. GENDER: female  : 1955  EPISODE DATE:  2022     Data     Recent Labs     22  1644 22  0218 22  0339 22  0640 22  0739   POCGLU 113* 109* 70 93 85 71       Medications  Scheduled Medications:   ferrous sulfate  324 mg Oral Every Other Day    vancomycin  1,000 mg IntraVENous Q12H    zoledronic acid (ZOMETA) IVPB  4 mg IntraVENous Once    vancomycin (VANCOCIN) intermittent dosing (placeholder)   Other RX Placeholder    cefepime  2,000 mg IntraVENous Q12H    metroNIDAZOLE  500 mg IntraVENous Q8H    rosuvastatin  5 mg Oral Nightly    insulin glargine  22 Units SubCUTAneous BID    gabapentin  300 mg Oral QAM    And    gabapentin  300 mg Oral Lunch    And    gabapentin  300 mg Oral QPM    And    gabapentin  900 mg Oral Nightly    insulin lispro  0-12 Units SubCUTAneous TID WC    insulin lispro  0-6 Units SubCUTAneous Nightly    sodium chloride flush  10 mL IntraVENous 2 times per day       Diet  Current diet/supplement order: ADULT DIET; Regular; 5 carb choices (75 gm/meal)     Recorded PO: PO Meals Eaten (%): 51 - 75% last meal in flowsheets      Action        Physician Notified of event: Yes   Marianela Devlin MD    POCT added at 0200.         Responce     Achieved POCT Blood Glucose greater than 70 mg/dl: Yes      Medication plan updated: Yes      Electronically signed by Vivi Villa RN on 2022 at 8:17 AM

## 2022-05-31 NOTE — CONSULTS
[facility]  Palliative Care   Consult Note    NAME:  Reji Monteiro  MEDICAL RECORD NUMBER:  [id number]  AGE: 77 y.o. GENDER: female  : 1955  TODAY'S DATE:  2022    Subjective     Reason for Consult:  goals of care  Visit Type: Initial Consult      Reji Monteiro is a 77 y.o. female referred by:   [x] Physician    PAST MEDICAL HISTORY      Diagnosis Date    Congenital prolapsed rectum     Displaced bladder     Enlarged, heterogeneous thyroid 2022    Hernia     Mixed hyperlipidemia 10/14/2019    Type 2 diabetes mellitus without complication ([facility] [address]     Varicose vein of leg        PAST SURGICAL HISTORY  History reviewed. No pertinent surgical history. FAMILY HISTORY  Family History   Problem Relation Age of Onset    High Blood Pressure Mother     Hypertension Mother     High Cholesterol Mother     Macular Degen Mother     Thyroid Disease Mother     Cancer Father     Alcohol Abuse Father     High Blood Pressure Sister     High Cholesterol Sister     Thyroid Disease Sister     Heart Disease Brother     Alcohol Abuse Brother        SOCIAL HISTORY  Social History     Tobacco Use    Smoking status: Never Smoker    Smokeless tobacco: Never Used   Substance Use Topics    Alcohol use: No     Alcohol/week: 0.0 standard drinks    Drug use: No       ALLERGIES  No Known Allergies    MEDICATIONS  No current facility-administered medications on file prior to encounter.      Current Outpatient Medications on File Prior to Encounter   Medication Sig Dispense Refill    insulin aspart (NOVOLOG) 100 UNIT/ML injection vial Inject 5-6 Units into the skin daily      acetaminophen (TYLENOL) 325 mg tablet Take 650 mg by mouth every 6 hours as needed for Pain      chlorpheniramine (SB CHLORPHENIRAMINE) 4 MG tablet Take 4 mg by mouth every 6 hours as needed for Allergies      Homeopathic Products (RESTFUL LEGS SL) Place 1 tablet under the tongue 4 times daily      gabapentin (NEURONTIN) 300 MG capsule FILL 11/3 1 tab in am 1 tab afternoon 1 tab at dinner 3 tabs at bedtime 180 capsule 5    insulin glargine (LANTUS;BASAGLAR) 100 UNIT/ML injection pen Inject 20 Units into the skin nightly (Patient taking differently: Inject 22 Units into the skin 2 times daily ) 5 pen 3    rosuvastatin (CRESTOR) 5 MG tablet TAKE ONE TABLET BY MOUTH ONCE NIGHTLY 90 tablet 1       Objective         BP (!) 102/57   Pulse 71   Temp 98.2 °F (36.8 °C) (Oral)   Resp 16   Ht 5' 1\" (1.549 m)   Wt 118 lb 9.7 oz (53.8 kg)   SpO2 92%   BMI 22.41 kg/m²     Code Status: Limited    Advanced Directives: not completed      Assessment        Management and Education    Persons available for education:        [x] Self     [] Caregiver       [] Spouse       [x] Other Family Member   []  Other    Spiritual History:  notified: Yes,     Does the patient have a Primary Care Physician? Yes    Palliative Performance Scale:  60% [x] Ambulation reduced; Significant disease; Can't do hobbies/housework; intake normal or reduced; occasional assist; LOC full/confusion  50% [] Mainly sit/lie; Extensive disease; Can't do any work; Considerable assist; intake normal or reduced; LOC full/confusion  40% [] Mainly in bed; Extensive disease; Mainly assist; intake normal or reduced; occasional assist; LOC full/confusion  30% [] Bed Bound; Extensive disease; Total care; intake reduced; LOC full/confusion  20% [] Bed Bound; Extensive disease; Total care; intake minimal; Drowsy/coma  10% [] Bed Bound; Extensive disease;  Total care; Mouth care only; Drowsy/coma  0 [] Death    Level of patient/caregiver understanding able to:       [x] Verbalize Understanding   [] Demonstrate Understanding       [] Teach Back       [] Needs Reinforcement     []  Other:      Teaching Time:  1hours  0 minutes     Plan        Social Service Consult Made:  Yes  Assistance filling out Living Will/HPOA:  Yes      Discharge Plan:  Education/support to family  Education/support to patient  Providing support for coping/adaptation/distress of family  Providing support for coping/adaptation/distress of patient  Clarification of medical condition to patient and family  Code status clarified: [facility]  Palliative care orders introduced    Discharge Environment:    [x] Other: patient agreed to Palliative Care consult as outpatient    Discussion: Patient admitted for complaint of pain in perineum, fatigue and generalized weakness. Patient found to have vulvar mass with prolapsed rectum, now causing leg weakness and falling. Met with patient and her sister Douglas Corral to discuss goals of care, permission given to discuss medical condition in front of her sister. Patient gives history of having \"growth on bottom\" for sometime and to embarrassed to seek help. She has only come to hospital for pain that was unbearable. She states current pain med helping. She plans on getting chemo/radiation treatment as described by Dr Shawna Henderson. We have discussed HPOA/living will. She has  and 2 daughters and her sister as support system. She does not feel her  would be able to manage making decisions and is interested in completing paperwork. Referral to spiritual care to assist. We have discussed code status and she has told her family for years she does not want resuscitative efforts if her heart stops or she stops breathing. We have discussed dietary supplement and she is agreeable to trying but does not like chocolate ensure. We discussed palliative care consult as outpatient and she was agreeable will place consult for [facility]. I have given her pamphlets on palliative care vs hospice care. She did express understanding of all that was discussed. Dr Michelle Olvera informed of above orders noted. I will continue to follow Ms. Reza's care as needed. Thank you for allowing me to participate in the care of Ms. Reza . Electronically signed by Neema Velasco, RN, BSN, [facility] on 5/31/2022 at 11:16 AM  Palliative Care Nurse [facility]  Office: [phone]

## 2022-06-01 NOTE — ACP (ADVANCE CARE PLANNING)
Advance Care Planning     Advance Care Planning Inpatient Note  Spiritual Care Department    Today's Date: 6/1/2022  Unit: [facility]    Received request from IDT Member. Upon review of chart and communication with care team, patient's decision making abilities are not in question. . Patient and sister Romina Esqueda present in the room during visit. Goals of ACP Conversation:  Discuss advance care planning documents  Facilitate a discussion related to patient's goals of care as they align with the patient's values and beliefs. Health Care Decision Makers:       Primary Decision Maker: Sushil Denton - Child - [phone]    Secondary Decision Maker: Maurice Don - [phone]    Supplemental (Other) Decision Maker: Ash Smith - Brother/Sister - [phone]    Summary:  Completed [facility]    Advance Care Planning Documents (Patient Wishes):  Healthcare Power of /Advance Directive Appointment of Health Care Agent  Living Will/Advance Directive  Portable DNR Form     Assessment:  Patient awake and alert, expressing desire to complete advance directives. Patient has made decisions about resuscitation as reflected in code status after discussion with palliative care. Patient's sister is at bedside and is a huge support per patient. Patient seems hopeful and willing to undergo cancer treatment to extend her life. Interventions:  Provided education on documents for clarity and greater understanding  Assisted in the completion of documents according to patient's wishes at this time    Care Preferences Communicated:   No    Outcomes/Plan:  ACP Discussion: Completed  New advance directive completed.   Returned original document(s) to patient, as well as copies for distribution to appointed agents    Electronically signed by Simón Hollins, [address] on 6/1/2022 at 12:07 PM

## 2022-06-01 NOTE — PROGRESS NOTES
No new complaints offered  Patient er well aware of details of treatment and appears eager to comply with recommendations  States keeping her appointments will not be a logistical problem  Will need port  Complete antibiotics for UTI  Sim tomorrow  Will follow up as outpatient

## 2022-06-01 NOTE — PLAN OF CARE
Problem: Discharge Planning  Goal: Discharge to home or other facility with appropriate resources  6/1/2022 0517 by Daryl Mejia RN  Outcome: Progressing  Care coordination involved to assess needs and help determine and explore appropriate resources. 5/31/2022 1718 by Elidia Denton RN  Outcome: Progressing     Problem: Pain  Goal: Verbalizes/displays adequate comfort level or baseline comfort level  6/1/2022 0517 by Daryl Mejia RN  Outcome: Progressing  Pain being managed with PRN analgesics per MD orders. Patient able to express presence and absence of pain and rate pain appropriately using numerical scale. 5/31/2022 1718 by Elidia Denton RN  Outcome: Progressing     Problem: Skin/Tissue Integrity  Goal: Absence of new skin breakdown  Description: 1. Monitor for areas of redness and/or skin breakdown  2. Assess vascular access sites hourly  3. Every 4-6 hours minimum:  Change oxygen saturation probe site  4. Every 4-6 hours:  If on nasal continuous positive airway pressure, respiratory therapy assess nares and determine need for appliance change or resting period. 6/1/2022 0517 by Daryl Mejia RN  Outcome: Progressing  Skin assessment completed every shift. Patient assessed for incontinence, appropriate barrier cream applied prn. Patient encouraged to turn/rotate every 2 hours. Assistance provided if patient unable to do so themselves. 5/31/2022 1718 by Elidia Denton RN  Outcome: Progressing     Problem: Safety - Adult  Goal: Free from fall injury  6/1/2022 0517 by Daryl Mejia RN  Outcome: Progressing  Fall risk assessment completed every shift. All precautions in place. Patient has call light with in reach at all times. Room free from clutter.  Patient aware to call for assistance when getting up.    5/31/2022 1718 by Elidia Denton RN  Outcome: Progressing     Problem: Metabolic/Fluid and Electrolytes - Adult  Goal: Electrolytes maintained within normal limits  6/1/2022 0517 by Maria G Giles RN  Outcome: Progressing  5/31/2022 1718 by Suyapa Reyes RN  Outcome: Progressing  Goal: Hemodynamic stability and optimal renal function maintained  6/1/2022 0517 by Maria G Giles RN  Outcome: Progressing  5/31/2022 1718 by Suyapa Reyes RN  Outcome: Progressing  Goal: Glucose maintained within prescribed range  6/1/2022 0517 by Maria G Giles RN  Outcome: Progressing  5/31/2022 1718 by Suyapa Reyes RN  Outcome: Progressing     Problem: ABCDS Injury Assessment  Goal: Absence of physical injury  6/1/2022 0517 by Maria G Giles RN  Outcome: Progressing  5/31/2022 1718 by Suyapa Reyes RN  Outcome: Progressing     Problem: Chronic Conditions and Co-morbidities  Goal: Patient's chronic conditions and co-morbidity symptoms are monitored and maintained or improved  6/1/2022 0517 by Maria G Giles RN  Outcome: Progressing  5/31/2022 1718 by Suyapa Reyes RN  Outcome: Progressing

## 2022-06-01 NOTE — PROGRESS NOTES
Clinical Pharmacy Note  Dose Adjustment     Anderson Nice is receiving Merrem. No Exclusion criteria met. ESBL in urine. Switched from meropenem 1gm q8h to [id number] per policy      Pharmacy will continue to monitor and adjust dose as needed for changes in renal function.    Claudia Inman, [address] [place] [address] 6/1/2022 8:38 AM

## 2022-06-01 NOTE — PROGRESS NOTES
sounds. Musculoskeletal: No clubbing, cyanosis   Skin: Skin color, texture, turgor normal.  No rashes or lesions. Neurologic: No focal weakness  Psychiatric: Alert and oriented  Capillary Refill: Brisk,3 seconds, normal   Peripheral Pulses: +2 palpable, equal bilaterally       Labs:   Recent Labs     05/30/22 0351 05/31/22  0431 06/01/22  0501   WBC 24.0* 21.8* 19.4*   HGB 8.7* 8.5* 9.1*   HCT 28.6* 27.6* 29.4*    326 332     Recent Labs     05/30/22 0351 05/31/22  0431 06/01/22  0501    139 136   K 3.0* 3.1* 3.8    107 104   CO2 26 25 22   BUN 18 11 10   CREATININE 0.5* 0.5* <0.5*   CALCIUM 10.7* 10.4 9.7     Recent Labs     05/30/22 0351 05/31/22  0431 06/01/22  0501   AST 72* 39* 26   ALT 53* 42* 37   BILITOT <0.2 <0.2 <0.2   ALKPHOS 84 80 82     No results for input(s): INR in the last 72 hours. Recent Labs     05/29/22  1751 05/29/22  2304 05/30/22  0351   TROPONINI 0.02* <0.01 0.01       Urinalysis:      Lab Results   Component Value Date    NITRU Negative 05/29/2022    WBCUA 289 05/29/2022    BACTERIA 4+ 05/29/2022    RBCUA 169 05/29/2022    BLOODU LARGE 05/29/2022    SPECGRAV 1.026 05/29/2022    GLUCOSEU Negative 05/29/2022       Radiology:  CT CHEST ABDOMEN PELVIS W CONTRAST   Final Result   1. No acute intrathoracic abnormality. 2. Large heterogeneously enhancing mass in the right vulva/perineum is   concerning for a malignant process. There is also gas within this lesion,   suggesting ulceration. Clinical correlation and tissue sampling are   recommended for further evaluation. 3. Bilateral inguinal lymphadenopathy. 4. Enlarged, heterogeneous thyroid. Ultrasound is recommended. RECOMMENDATIONS:   Incidental heterogeneous and enlarged thyroid. Recommend thyroid US. Reference: J Am Riana Radiol. 2015 Feb;12(2): 143-50         CT Cervical Spine WO Contrast   Final Result   1. No acute abnormality of the cervical spine. 2. Multilevel degenerative changes. RECOMMENDATIONS:   1 cm incidental right thyroid nodule. No follow-up imaging is recommended. Reference: J Am Riana Radiol. 2015 Feb;12(2): 143-50         CT Head WO Contrast   Final Result   No acute intracranial abnormality. XR CHEST PORTABLE   Final Result   No acute cardiopulmonary abnormality. US THYROID    (Results Pending)           Assessment/Plan:    Active Hospital Problems    Diagnosis     Severe malnutrition ([facility] [E43]      Priority: Medium    Acute cystitis with hematuria [N30.01]      Priority: Medium    Severe sepsis ([facility] [A41.9, R65.20]      Priority: Medium    Septic shock ([facility] [A41.9, R65.21]      Priority: Medium    Tachycardia [R00.0]      Priority: Medium    Neutrophilic leukocytosis [G32.2]      Priority: Medium    Elevated lactic acid level [R79.89]      Priority: Medium    Elevated liver function tests [R79.89]      Priority: Medium    Generalized weakness [R53.1]      Priority: Medium    Fatigue [R53.83]      Priority: Medium    Vulvar mass [N90.89]      Priority: Medium    Enlarged, heterogeneous thyroid [E04.9]      Priority: Medium    Mixed hyperlipidemia [E78.2]     DMII (diabetes mellitus, type 2) ([facility] [E11.9]      1.  UTI, patient was started on cefepime, pending cultures.  Also vancomycin and Flagyl added empirically on admission, ID consulted, urine culture positive for E. coli, apparently ESBL, changing antibiotics to meropenem pending further ID recommendations.   2.  Perineal/vulvar mass, gynecology consulted, plans for chemotherapy and chemoradiation, also oncology recommended GI consult for potential colonoscopy, added   3.  Sepsis, severe, IV fluid given, IV fluid maintenance.  Monitor closely  4.  Elevated LFTs, monitor for now  5.  Generalized weakness, due to above, PT OT  6.  Large thyroid, heterogeneous, possible nodules, ordered ultrasound  7.  Diabetes mellitus, sliding scale  8.  Elevated lactic acid on admission, improved  9.  Hypercalcemia on admission, received IV fluid, improving, alendronate given by oncology  10.  Anemia, no obvious bleeding, suspecting due to vulvar mass which is highly suspicious for malignancy along with necrotic tissue. Diet: ADULT DIET;  Regular; 5 carb choices (75 gm/meal)  ADULT ORAL NUTRITION SUPPLEMENT; Breakfast, Lunch, Dinner; Diabetic Oral Supplement  Code Status: Xiomy Burton MD

## 2022-06-01 NOTE — PROGRESS NOTES
Report received from day shift RN. Patient resting comfortably in bed. No signs of discomfort or distress. Bed is in lowest position, wheels locked, 2/2 side rails up. Bedside table and call light within reach. White board updated. Will continue to monitor patient. Jessica Johnson RN    8:02 PM  VSS. Patient denies pain. Jessica Johnson RN    8:49 PM  Shift assessment complete. (See findings in flowsheet). Med pass complete. (See MAR). VSS. Patient c/o pain 8/10 in benita area, PRN oxy given. Jessica Johnson RN    9:43 PM  Patient resting in bed with eyes closed, appears comfortable. No apparent needs at this time. Jessica Johnson RN    3:46 AM  Patient resting quietly in bed with eyes closed. No apparent needs at this time.  Jessica Johnson RN

## 2022-06-01 NOTE — PROGRESS NOTES
[facility]  Oncology Hematology Care  Progress Note      SUBJECTIVE:   p mac having the best day   She was upset about the potential for a colonoscopy-she has declined this -I explained to her why it is part of the discussion but that if she turns it down we would still move forward with planned chemo rads   OBJECTIVE      Medications    Current Facility-Administered Medications: meropenem (MERREM) 500 mg IVPB (mini-bag), 500 mg, IntraVENous, Q6H  ferrous sulfate EC tablet 324 mg, 324 mg, Oral, Every Other Day  insulin glargine (LANTUS) injection vial 18 Units, 18 Units, SubCUTAneous, Nightly  traMADol (ULTRAM) tablet 25 mg, 25 mg, Oral, Q6H PRN  oxyCODONE (ROXICODONE) immediate release tablet 2.5 mg, 2.5 mg, Oral, Q3H PRN  rosuvastatin (CRESTOR) tablet 5 mg, 5 mg, Oral, Nightly  gabapentin (NEURONTIN) capsule 300 mg, 300 mg, Oral, QAM **AND** gabapentin (NEURONTIN) capsule 300 mg, 300 mg, Oral, Lunch **AND** gabapentin (NEURONTIN) capsule 300 mg, 300 mg, Oral, QPM **AND** gabapentin (NEURONTIN) capsule 900 mg, 900 mg, Oral, Nightly  insulin lispro (HUMALOG) injection vial 0-12 Units, 0-12 Units, SubCUTAneous, TID WC  insulin lispro (HUMALOG) injection vial 0-6 Units, 0-6 Units, SubCUTAneous, Nightly  glucose chewable tablet 16 g, 4 tablet, Oral, PRN  dextrose bolus 10% 125 mL, 125 mL, IntraVENous, PRN **OR** dextrose bolus 10% 250 mL, 250 mL, IntraVENous, PRN  glucagon (rDNA) injection 1 mg, 1 mg, IntraMUSCular, PRN  dextrose 5 % solution, 100 mL/hr, IntraVENous, PRN  sodium chloride flush 0.9 % injection 10 mL, 10 mL, IntraVENous, 2 times per day  sodium chloride flush 0.9 % injection 10 mL, 10 mL, IntraVENous, PRN  0.9 % sodium chloride infusion, , IntraVENous, PRN  ondansetron (ZOFRAN) injection 4 mg, 4 mg, IntraVENous, Q4H PRN  polyethylene glycol (GLYCOLAX) packet 17 g, 17 g, Oral, Daily PRN  acetaminophen (TYLENOL) tablet 650 mg, 650 mg, Oral, Q4H PRN **OR** acetaminophen (TYLENOL) suppository 650 mg, 650 mg, Rectal, Q4H PRN  0.9 % sodium chloride infusion, , IntraVENous, Continuous  Physical    VITALS:  /60   Pulse 96   Temp 99.1 °F (37.3 °C) (Oral)   Resp 14   Ht 5' 1\" (1.549 m)   Wt 118 lb 9.7 oz (53.8 kg)   SpO2 94%   BMI 22.41 kg/m²   TEMPERATURE:  Current - Temp: 99.1 °F (37.3 °C);  Max - Temp  Av.7 °F (37.1 °C)  Min: 98.3 °F (36.8 °C)  Max: 99.1 °F (37.3 °C)  PULSE OXIMETRY RANGE: SpO2  Av.5 %  Min: 90 %  Max: 95 %  24HR INTAKE/OUTPUT:    Intake/Output Summary (Last 24 hours) at 2022 1850  Last data filed at 2022 1759  Gross per 24 hour   Intake 4187.76 ml   Output --   Net 4187.76 ml       CONSTITUTIONAL:  awake, alert, cooperative, no apparent distress, HEENT oral pharynx , no scleral icterus  HEMATOLOGIC/LYMPHATICS:  no cervical lymphadenopathy, no supraclavicular lymphadenopathy, no axillary lymphadenopathy and no inguinal lymphadenopathy  LUNGS:  No increased work of breathing, good air exchange, clear to auscultation bilaterally, no crackles or wheezing  CARDIOVASCULAR:  , regular rate and rhythm, normal S1 and S2, no S3 or S4, and no murmur noted  ABDOMEN:  No scars, normal bowel sounds, soft, non-distended, non-tender, no masses palpated, no hepatosplenomegally        Data      Recent Labs     22  0351 22  0431 22  0501   WBC 24.0* 21.8* 19.4*   HGB 8.7* 8.5* 9.1*   HCT 28.6* 27.6* 29.4*    326 332   MCV 83.0 82.7 81.9        Recent Labs     22  0351 22  0431 22  0501    139 136   K 3.0* 3.1* 3.8    107 104   CO2 26 25 22   BUN 18 11 10   CREATININE 0.5* 0.5* <0.5*     Recent Labs     22  0351 22  0431 22  0501   AST 72* 39* 26   ALT 53* 42* 37   BILITOT <0.2 <0.2 <0.2   ALKPHOS 84 80 82       Magnesium:    Lab Results   Component Value Date    MG 1.60 2022    MG 1.80 2022    MG 2.10 2015       Imaging CT Head WO Contrast    Result Date: 2022  EXAMINATION: CT OF THE HEAD WITHOUT CONTRAST  5/29/2022 5:38 pm TECHNIQUE: CT of the head was performed without the administration of intravenous contrast. Automated exposure control, iterative reconstruction, and/or weight based adjustment of the mA/kV was utilized to reduce the radiation dose to as low as reasonably achievable. COMPARISON: None. HISTORY: ORDERING SYSTEM PROVIDED HISTORY: Syncope, frequent falls TECHNOLOGIST PROVIDED HISTORY: Reason for exam:->Syncope, frequent falls Has a \"code stroke\" or \"stroke alert\" been called? ->No Decision Support Exception - unselect if not a suspected or confirmed emergency medical condition->Emergency Medical Condition (MA) FINDINGS: BRAIN/VENTRICLES: There is no acute intracranial hemorrhage, mass effect or midline shift. No abnormal extra-axial fluid collection. The gray-white differentiation is maintained without evidence of an acute infarct. There is no evidence of hydrocephalus. ORBITS: The visualized portion of the orbits demonstrate no acute abnormality. SINUSES: The visualized paranasal sinuses and mastoid air cells demonstrate no acute abnormality. SOFT TISSUES/SKULL:  No acute abnormality of the visualized skull or soft tissues. No acute intracranial abnormality. CT Cervical Spine WO Contrast    Result Date: 5/29/2022  EXAMINATION: CT OF THE CERVICAL SPINE WITHOUT CONTRAST 5/29/2022 5:39 pm TECHNIQUE: CT of the cervical spine was performed without the administration of intravenous contrast. Multiplanar reformatted images are provided for review. Automated exposure control, iterative reconstruction, and/or weight based adjustment of the mA/kV was utilized to reduce the radiation dose to as low as reasonably achievable. COMPARISON: None.  HISTORY: ORDERING SYSTEM PROVIDED HISTORY: Syncope TECHNOLOGIST PROVIDED HISTORY: Reason for exam:->Syncope Decision Support Exception - unselect if not a suspected or confirmed emergency medical condition->Emergency Medical Condition (MA) FINDINGS: BONES/ALIGNMENT: There is no acute fracture or traumatic malalignment. DEGENERATIVE CHANGES: Mild, multilevel degenerative changes. SOFT TISSUES: There is no prevertebral soft tissue swelling. 1 cm right thyroid nodule. 1. No acute abnormality of the cervical spine. 2. Multilevel degenerative changes. RECOMMENDATIONS: 1 cm incidental right thyroid nodule. No follow-up imaging is recommended. Reference: J Am Riana Radiol. 2015 Feb;12(2): 143-50     US THYROID    Result Date: 6/1/2022  EXAMINATION: THYROID ULTRASOUND 6/1/2022 COMPARISON: None. HISTORY: ORDERING SYSTEM PROVIDED HISTORY: heterogenous thyroid TECHNOLOGIST PROVIDED HISTORY: Reason for exam:->heterogenous thyroid FINDINGS: Right thyroid lobe:  4.3 x 2.1 x 1.9 cm Left thyroid lobe:  4 x 1.5 x 1.4 cm Isthmus:  0.3 cm Thyroid Gland: The thyroid gland is heterogeneous in appearance. Few thyroid nodules are seen bilaterally. Nodules: NODULE: Right 1 Size: 15 x 9 x 14 mm Location: Mid right thyroid 1. Composition:  Solid (2) 2. Echogenicity:  Very hypoechoic (3) 3. Shape: Taller-than-wide (3) 4. Margins:  Ill-defined (0) 5. Echogenic foci:  Peripheral (rim) calcifications (2) ACR TI-RADS total points:  10 ACR TI-RADS risk category: TR5 Prior biopsy: Unknown. This meets criteria for FNA. NODULE: Right 2 Size: 10 x 11 x 11 mm Location: Inferior right thyroid 1. Composition:  Solid (2) 2. Echogenicity:  Hypoechoic (2) 3. Shape: Wider-than-tall (0) 4. Margins:  Ill-defined (0) 5. Echogenic foci:  None (0) ACR TI-RADS total points:  4 ACR TI-RADS risk category: TR4 Prior biopsy: Unknown. This meets criteria for annual follow-up per the schedule below. NODULE: Left 1 Size: 9 x 12 x 7 mm Location: Superior left thyroid 1. Composition:  Solid (2) 2. Echogenicity:  Isoechoic (1) 3. Shape: Wider-than-tall (0) 4. Margins:  Smooth (0) 5. Echogenic foci:  None (0) ACR TI-RADS total points:  3 ACR TI-RADS risk category: TR3 Prior biopsy: Unknown.  No further follow-up required. NODULE: Left 2 Size: 11 x 11 x 11 mm Location: The mid left thyroid 1. Composition:  Solid (2) 2. Echogenicity:  Isoechoic (1) 3. Shape: Wider-than-tall (0) 4. Margins:  Smooth (0) 5. Echogenic foci:  None (0) ACR TI-RADS total points:  3 ACR TI-RADS risk category: TR3 Prior biopsy: Unknown. No further follow-up required. Cervical lymphadenopathy: No abnormal lymph nodes in the imaged portions of the neck. Multiple thyroid nodules. There is a very hypoechoic peripherally calcified TR 5 15 mm right thyroid nodule which meets criteria for FNA. Additional TR 4 nodule in the right thyroid meets criteria for follow-up but not biopsy. TR 3 nodules in the left thyroid do not meet criteria for follow-up or biopsy. See recommendations below. RECOMMENDATIONS: ACR TI-RADS recommendations: TR5 (>= 7 points):  FNA if >= 1 cm; follow-up if 0.5-0.9 cm in 1, 2, 3, 4, and 5 years TR4 (4-6 points):  FNA if >= 1.5 cm; follow-up if 1.0-1.4 cm in 1, 2, 3, and 5 years TR3 (3 points):  FNA if >= 2.5 cm; follow-up if 1.5-2.4 cm in 1, 3, and 5 years TR2 (2 points):  No FNA or follow-up TR1 (0 points):  No FNA or follow-up ACR TI-RADS recommends that no more than two nodules with the highest ACR TI-RADS point total should be biopsied and no more than four nodules should be followed. XR CHEST PORTABLE    Result Date: 5/29/2022  EXAMINATION: ONE XRAY VIEW OF THE CHEST 5/29/2022 5:28 pm COMPARISON: 11/19/2015 HISTORY: ORDERING SYSTEM PROVIDED HISTORY: Syncope TECHNOLOGIST PROVIDED HISTORY: Reason for exam:->Syncope Reason for Exam: Extremity Weakness; Wound Check; Fatigue FINDINGS: Cardiomediastinal silhouette is normal in size. There is no pleural effusion or pneumothorax. The lungs are hyperinflated. No pulmonary consolidation. There is no acute osseous abnormality. No acute cardiopulmonary abnormality.      CT CHEST ABDOMEN PELVIS W CONTRAST    Result Date: 5/29/2022  EXAMINATION: CT OF THE CHEST, ABDOMEN, AND PELVIS WITH CONTRAST 5/29/2022 6:41 pm TECHNIQUE: CT of the chest, abdomen and pelvis was performed with the administration of intravenous contrast. Multiplanar reformatted images are provided for review. Automated exposure control, iterative reconstruction, and/or weight based adjustment of the mA/kV was utilized to reduce the radiation dose to as low as reasonably achievable. COMPARISON: 11/20/2015 HISTORY: ORDERING SYSTEM PROVIDED HISTORY: Large, necrotic appearing mass/swelling to vulva/rectal region; pt states has been present for \"years\" TECHNOLOGIST PROVIDED HISTORY: Reason for exam:->Large, necrotic appearing mass/swelling to vulva/rectal region; pt states has been present for \"years\" Additional Contrast?->None FINDINGS: Chest: Mediastinum: Thyroid is enlarged and heterogeneous. Heart size is normal without pericardial effusion. Thoracic aorta and main pulmonary artery are normal in caliber. Coronary artery atherosclerosis. No mediastinal lymphadenopathy. Lungs/pleura: There is no pleural effusion. There is no pneumothorax. There is no pulmonary consolidation. Soft Tissues/Bones: Multilevel degenerative changes of the thoracic spine. Abdomen/Pelvis: Organs: No acute abnormality within the liver, spleen, gallbladder, pancreas, or adrenal glands. No nephrolithiasis or hydronephrosis. GI/Bowel: Stomach is nondistended. The small bowel is nondilated. The colon is nondilated. Pelvis: Bladder is partially distended without vesicular stone. Uterus is present. Endometrial lesions are partially visualized, most consistent with fibroids. Peritoneum/Retroperitoneum: No ascites or pneumoperitoneum. Atherosclerosis of the nondilated abdominal aorta. There is a fat containing ventral abdominal wall hernia. Bones/Soft Tissues: Multilevel degenerative changes of the lumbar spine. There is a heterogeneously enhancing mass in the right vulva/perineum measuring up to 14.1 x 8.8 x 7.5 cm.   There are foci of gas within this lesion, suggesting ulceration/necrosis. There are mildly enlarged bilateral inguinal lymph nodes. 1. No acute intrathoracic abnormality. 2. Large heterogeneously enhancing mass in the right vulva/perineum is concerning for a malignant process. There is also gas within this lesion, suggesting ulceration. Clinical correlation and tissue sampling are recommended for further evaluation. 3. Bilateral inguinal lymphadenopathy. 4. Enlarged, heterogeneous thyroid. Ultrasound is recommended. RECOMMENDATIONS: Incidental heterogeneous and enlarged thyroid. Recommend thyroid US. Reference: J Am Riana Radiol.  2015 Feb;12(2): 143-50       Problem List  Patient Active Problem List   Diagnosis    DMII (diabetes mellitus, type 2) ([address]    Duodenal ulcer    Pain in both lower extremities    Varicosities of leg    Elevated blood pressure reading    Mixed hyperlipidemia    Type 2 diabetes mellitus with diabetic neuropathy    Acute cystitis with hematuria    Severe sepsis (HCC)    Septic shock (HCC)    Tachycardia    Neutrophilic leukocytosis    Elevated lactic acid level    Elevated liver function tests    Generalized weakness    Fatigue    Vulvar mass    Enlarged, heterogeneous thyroid    Severe malnutrition ([address]       ASSESSMENT AND PLAN    Advanced vulvar ca  Discussed with pt at length   Plan for sim   outpt fu with chemo rads goal palliative   Will arrange outpt fu     Gemma Vallejo MD, MD

## 2022-06-01 NOTE — CONSULTS
GASTROENTEROLOGY INPATIENT CONSULTATION        IDENTIFYING DATA/REASON FOR CONSULTATION   PATIENT:  Anderson Nice  MRN:  [id number]  ADMIT DATE: 5/29/2022  TIME OF EVALUATION: 6/1/2022 9:31 AM  HOSPITAL STAY:   LOS: 3 days     REASON FOR CONSULTATION:  anemia    HISTORY OF PRESENT ILLNESS   Anderson Nice is a 77 y.o. female with a PMH of DM and HLD who presented on 5/29/2022 with fatigue, weakness, decreased appetite, and significant weight loss. She was admitted with UTI. She also complained of mass in the vulvar perineal region and had a CT scan of the chest/abd/pel performed with showed large necrotic appearing mass in the vulvar rectal region measuring 14.1 x 8.8 x 7.5 cm with foci of gas within the lesion suggesting ulceration/process and mildly enlarged bilateral inguinal nodes. She was evaluated by GYN ONC who highly suspects vulva cancer with plans to biopsy this week. She does not appear to be a surgical candidate and chemoradiation recommended. We have been consulted regarding anemia with requests for screening colonoscopy. Patient reports she has never undergone a prior colonoscopy procedure. She denies any known family history of colon cancer. She has been bleeding from her valvular mass. She denies any rectal bleeding, bloody stools or melenic stools. She denies abdominal pain. PAST MEDICAL, SURGICAL, FAMILY, and SOCIAL HISTORY     Past Medical History:   Diagnosis Date    Congenital prolapsed rectum     Displaced bladder     Enlarged, heterogeneous thyroid 5/29/2022    Hernia     Mixed hyperlipidemia 10/14/2019    Severe malnutrition ([address] 5/31/2022    Type 2 diabetes mellitus without complication ([address] 1/81/7991    Varicose vein of leg      History reviewed. No pertinent surgical history.   Family History   Problem Relation Age of Onset    High Blood Pressure Mother     Hypertension Mother     High Cholesterol Mother     Macular Degen Mother     Thyroid Disease Mother  Cancer Father     Alcohol Abuse Father     High Blood Pressure Sister     High Cholesterol Sister     Thyroid Disease Sister     Heart Disease Brother     Alcohol Abuse Brother      Social History     Socioeconomic History    Marital status:      Spouse name: None    Number of children: None    Years of education: None    Highest education level: None   Occupational History    None   Tobacco Use    Smoking status: Never Smoker    Smokeless tobacco: Never Used   Substance and Sexual Activity    Alcohol use: No     Alcohol/week: 0.0 standard drinks    Drug use: No    Sexual activity: Yes     Partners: Male   Other Topics Concern    None   Social History Narrative    None     Social Determinants of Health     Financial Resource Strain:     Difficulty of Paying Living Expenses: Not on file   Food Insecurity:     Worried About Running Out of Food in the Last Year: Not on file    Bhavana of Food in the Last Year: Not on file   Transportation Needs:     Lack of Transportation (Medical): Not on file    Lack of Transportation (Non-Medical):  Not on file   Physical Activity:     Days of Exercise per Week: Not on file    Minutes of Exercise per Session: Not on file   Stress:     Feeling of Stress : Not on file   Social Connections:     Frequency of Communication with Friends and Family: Not on file    Frequency of Social Gatherings with Friends and Family: Not on file    Attends Faith Services: Not on file    Active Member of Clubs or Organizations: Not on file    Attends Club or Organization Meetings: Not on file    Marital Status: Not on file   Intimate Partner Violence:     Fear of Current or Ex-Partner: Not on file    Emotionally Abused: Not on file    Physically Abused: Not on file    Sexually Abused: Not on file   Housing Stability:     Unable to Pay for Housing in the Last Year: Not on file    Number of [place] in the Last Year: Not on file    Unstable Housing in the Last Year: Not on file       MEDICATIONS   SCHEDULED:  meropenem, 500 mg, Q6H  ferrous sulfate, 324 mg, Every Other Day  insulin glargine, 18 Units, Nightly  vancomycin (VANCOCIN) intermittent dosing (placeholder), , RX Placeholder  metroNIDAZOLE, 500 mg, Q8H  rosuvastatin, 5 mg, Nightly  gabapentin, 300 mg, QAM   And  gabapentin, 300 mg, Lunch   And  gabapentin, 300 mg, QPM   And  gabapentin, 900 mg, Nightly  insulin lispro, 0-12 Units, TID WC  insulin lispro, 0-6 Units, Nightly  sodium chloride flush, 10 mL, 2 times per day      FLUIDS/DRIPS:     dextrose      sodium chloride 5 mL/hr at 06/01/22 0028    sodium chloride 100 mL/hr at 06/01/22 0025     PRNs: traMADol, 25 mg, Q6H PRN  oxyCODONE, 2.5 mg, Q3H PRN  glucose, 4 tablet, PRN  dextrose bolus, 125 mL, PRN   Or  dextrose bolus, 250 mL, PRN  glucagon (rDNA), 1 mg, PRN  dextrose, 100 mL/hr, PRN  sodium chloride flush, 10 mL, PRN  sodium chloride, , PRN  ondansetron, 4 mg, Q4H PRN  polyethylene glycol, 17 g, Daily PRN  acetaminophen, 650 mg, Q4H PRN   Or  acetaminophen, 650 mg, Q4H PRN      ALLERGIES:  She No Known Allergies    REVIEW OF SYSTEMS   Pertinent ROS noted in HPI    PHYSICAL EXAM     Vitals:    05/31/22 1758 06/01/22 0230 06/01/22 0502 06/01/22 0800   BP: (!) 123/56 (!) 108/57  (!) 93/52   Pulse: 83 81  75   Resp: 22 13  15   Temp: 98.5 °F (36.9 °C)      TempSrc: Oral   Oral   SpO2: 95% 95%  95%   Weight:   118 lb 9.7 oz (53.8 kg)    Height:           I/O last 3 completed shifts: In: 5508 [P.O.:1020; I.V.:415; IV Piggyback:50]  Out: 650 [Urine:650]      Physical Exam:  General appearance: alert, cooperative, no distress, appears stated age  Eyes: Anicteric  Head: Normocephalic, without obvious abnormality  Lungs: clear to auscultation bilaterally, Normal Effort  Heart: regular rate and rhythm, normal S1 and S2, no murmurs or rubs  Abdomen: soft, non-distended, non-tender. Bowel sounds normal. No masses,  no organomegaly.    Extremities: atraumatic, no cyanosis or edema  Skin: warm and dry, no jaundice  Neuro: Grossly intact, A&OX3      LABS AND IMAGING   Laboratory   Recent Labs     05/30/22 0351 05/31/22 0431 06/01/22  0501   WBC 24.0* 21.8* 19.4*   HGB 8.7* 8.5* 9.1*   HCT 28.6* 27.6* 29.4*   MCV 83.0 82.7 81.9    326 332     Recent Labs     05/30/22 0351 05/31/22 0431 06/01/22  0501    139 136   K 3.0* 3.1* 3.8    107 104   CO2 26 25 22   BUN 18 11 10   CREATININE 0.5* 0.5* <0.5*     Recent Labs     05/30/22 0351 05/31/22 0431 06/01/22  0501   AST 72* 39* 26   ALT 53* 42* 37   BILITOT <0.2 <0.2 <0.2   ALKPHOS 84 80 82     No results for input(s): LIPASE, AMYLASE in the last 72 hours. No results for input(s): PROTIME, INR in the last 72 hours. Imaging  CT CHEST ABDOMEN PELVIS W CONTRAST   Final Result   1. No acute intrathoracic abnormality. 2. Large heterogeneously enhancing mass in the right vulva/perineum is   concerning for a malignant process. There is also gas within this lesion,   suggesting ulceration. Clinical correlation and tissue sampling are   recommended for further evaluation. 3. Bilateral inguinal lymphadenopathy. 4. Enlarged, heterogeneous thyroid. Ultrasound is recommended. RECOMMENDATIONS:   Incidental heterogeneous and enlarged thyroid. Recommend thyroid US. Reference: J Am Riana Radiol. 2015 Feb;12(2): 143-50         CT Cervical Spine WO Contrast   Final Result   1. No acute abnormality of the cervical spine. 2. Multilevel degenerative changes. RECOMMENDATIONS:   1 cm incidental right thyroid nodule. No follow-up imaging is recommended. Reference: J Am Riana Radiol. 2015 Feb;12(2): 143-50         CT Head WO Contrast   Final Result   No acute intracranial abnormality. XR CHEST PORTABLE   Final Result   No acute cardiopulmonary abnormality.          US THYROID    (Results Pending)         ASSESSMENT AND RECOMMENDATIONS   77 y.o. female with a PMH of DM and HLD who presented on 5/29/2022 with fatigue, weakness, decreased appetite,  significant weight loss and vulvar mass. CT scan of the chest/abd/pel performed with showed large necrotic appearing mass in the vulvar rectal region measuring 14.1 x 8.8 x 7.5 cm with foci of gas within the lesion suggesting ulceration/process and mildly enlarged bilateral inguinal nodes. She was evaluated by oncology, gyn onc and radiation oncology and plan is to pursue chemoradiation therapy. We have been consulted regarding anemia with requests for screening colonoscopy        IMPRESSION/RECOMMENDATIONS:  1. Vulvar mass highly suspicious for malignancy. Plans is to start chemoradiation therapy. 2. UTI sepsis  3. Anemia with component of iron deficiency (Iron 10, saturation 9%, ferritin 209, TIBC 111). Likely due to #1 however agree with screening colonoscopy to rule out CRC. Never had colonoscopy. Discussed recommendations with pt however she is hesitant and unsure if she wants to undergo colonoscopy at this time. Dr. Barrientos Mt to see to further discuss              If you have any questions or need any further information, please feel free to contact our consult team.  Thank you for allowing us to participate in the care of Devin Tompkins. The note was completed using Dragon voice recognition transcription. Every effort was made to ensure accuracy; however, inadvertent transcription errors may be present despite my best efforts to edit errors.       Lauren Young PA-C

## 2022-06-01 NOTE — PROGRESS NOTES
Physician Progress Note      Kevon Chamberlain  CSN #:                  [id number]  :                       1955  ADMIT DATE:       2022 4:41 PM  [facility] DATE:  RESPONDING  PROVIDER #:        Bandar Rai MD          QUERY TEXT:    Dear Dr. Rica Mejia,  Patient admitted with Sepsis and suspected malignant necrotic vulvar mass. If   possible, please document in progress notes and discharge summary if you are   evaluating and /or treating any of the following: The medical record reflects the following:  Risk Factors: Hx DM, Vulvar mass noted to be malignancy by Oncology consult,   Current sepsis, UTI  Clinical Indicators:  ED for weakness, syncopal episodes, fall, sore   around rectum, no appetites and 28 lb weight loss reported in past 2-3 weeks   Albumin=3.1,  TP=5.0 and Alb=2.3,  [facility] notes \"suffered a nearly 50   pound weight loss\", Alb=2.1 and TP=4.8, Pt is 5' 1' and 118 lb and BMI=22.41  Treatment: Regular diet, monitor labs, Nutritional supplement TID, monitor   intake  Thank you,  Gena Brown RN, CDS  [email or web address]    ASPEN Criteria:    https://aspenjournals. onlinelibrary. romero. com/doi/full/10.1177/508774475521760  5  Options provided:  -- Protein calorie malnutrition severe  -- Other - I will add my own diagnosis  -- Disagree - Not applicable / Not valid  -- Disagree - Clinically unable to determine / Unknown  -- Refer to Clinical Documentation Reviewer    PROVIDER RESPONSE TEXT:    This patient has severe protein calorie malnutrition. Query created by:  [id number]  on 2022 11:58 AM      Electronically signed by:  Bandar Rai MD 2022 7:51 AM

## 2022-06-01 NOTE — PROGRESS NOTES
Infectious Disease Follow up Notes  Admit Date: 5/29/2022  Hospital Day: 4    Antibiotics :   IV Meropenem     CHIEF COMPLAINT:   '  Sepsis  UTI  Esbl  Vulvar cancer  WBC elevation    Subjective interval History :  77 y.o. Woman admitted with large fungating vulvar mass with a concern for malignancy, she has on going drainage with odor, on gong for months and pt was fearful not to seek medical attention, WBC at  25.2 , lactic acid 2, Calcium 13.9, UA very abnormal and urine cx  E coli noted and CT abd/pelvis with large fungating mass from the vulvar area- seen by Gynecology we are consulted for IV abx recommendations. Location :  Vulvar area pain, pelvic pain     Quality :aching         Severity :10/10     Duration :  Weeks      Timing : constant   Context : mass in the vulvar area     Modifying factors : none    Associated signs and symptoms:  No fevers no chills,     Interval History :  Urine cx ESBL noted and c/o lower abd pain and WBC still elevation and no fever no chills    Past Medical History:    Past Medical History:   Diagnosis Date    Congenital prolapsed rectum     Displaced bladder     Enlarged, heterogeneous thyroid 5/29/2022    Hernia     Mixed hyperlipidemia 10/14/2019    Severe malnutrition ([facility] [address] 5/31/2022    Type 2 diabetes mellitus without complication ([facility] [address] 7/10/6366    Varicose vein of leg        Past Surgical History:    History reviewed. No pertinent surgical history.     Current Medications:    Outpatient Medications Marked as Taking for the 5/29/22 encounter [facility] Encounter)   Medication Sig Dispense Refill    insulin aspart (NOVOLOG) 100 UNIT/ML injection vial Inject 5-6 Units into the skin daily      acetaminophen (TYLENOL) 325 mg tablet Take 650 mg by mouth every 6 hours as needed for Pain      chlorpheniramine (SB CHLORPHENIRAMINE) 4 MG tablet Take 4 mg by mouth every 6 hours as needed for Allergies      Homeopathic Products (RESTFUL LEGS SL) Place 1 tablet under the tongue 4 times daily         Allergies:  Patient has no known allergies.     Immunizations :   Immunization History   Administered Date(s) Administered    COVID-19, Pfizer Purple top, DILUTE for use, 12+ yrs, 30mcg/0.3mL dose 03/11/2021, 04/01/2021, 10/22/2021    Pneumococcal Polysaccharide ([id number]) 10/20/2020, 10/20/2020       Social History:    Social History     Tobacco Use    Smoking status: Never Smoker    Smokeless tobacco: Never Used   Substance Use Topics    Alcohol use: No     Alcohol/week: 0.0 standard drinks    Drug use: No     Social History     Tobacco Use   Smoking Status Never Smoker   Smokeless Tobacco Never Used      Family History   Problem Relation Age of Onset    High Blood Pressure Mother     Hypertension Mother     High Cholesterol Mother     Macular Degen Mother     Thyroid Disease Mother     Cancer Father     Alcohol Abuse Father     High Blood Pressure Sister     High Cholesterol Sister     Thyroid Disease Sister     Heart Disease Brother     Alcohol Abuse Brother        REVIEW OF SYSTEMS:     Constitutional:  negative for fevers, chills, night sweats  Eyes:  negative for blurred vision, eye discharge, visual disturbance   HEENT:  negative for hearing loss, ear drainage,nasal congestion  Respiratory:  negative for cough, shortness of breath or hemoptysis   Cardiovascular:  negative for chest pain, palpitations, syncope  Gastrointestinal:  negative for nausea, vomiting, diarrhea, constipation, abdominal pain++   Genitourinary:  negative for frequency, dysuria+  urinary incontinence, hematuria  Hematologic/Lymphatic:  negative for easy bruising, bleeding and lymphadenopathy  Allergic/Immunologic:  negative for recurrent infections, angioedema, anaphylaxis   Endocrine:  negative for weight changes, polyuria, polydipsia and polyphagia  Musculoskeletal:  negative for joint  pain, swelling, decreased range of motion  Integumentary: No rashes, skin lesions  Neurological:  negative for headaches, slurred speech, unilateral weakness  Psychiatric: negative for hallucinations,confusion,agitation.                 PHYSICAL EXAM:      Vitals:    BP (!) 93/44   Pulse 89   Temp 98.6 °F (37 °C) (Oral)   Resp 18   Ht 5' 1\" (1.549 m)   Wt 118 lb 9.7 oz (53.8 kg)   SpO2 90%   BMI 22.41 kg/m²     General Appearance: alert,in some acute distress, ++  pallor, no icterus   Skin: warm and dry, no rash or erythema  Head: normocephalic and atraumatic  Eyes: pupils equal, round, and reactive to light, conjunctivae normal  ENT: tympanic membrane, external ear and ear canal normal bilaterally, nose without deformity, nasal mucosa and turbinates normal without polyps  Neck: supple and non-tender without mass, no thyromegaly  no cervical lymphadenopathy  Pulmonary/Chest: clear to auscultation bilaterally- no wheezes, rales or rhonchi, normal air movement, no respiratory distress  Cardiovascular: normal rate, regular rhythm, normal S1 and S2, no murmurs, rubs, clicks, or gallops, no carotid bruits  Abdomen: soft, non-tender, non-distended, normal bowel sounds, no masses or organomegaly  Extremities: no cyanosis, clubbing or edema  Musculoskeletal: normal range of motion, no joint swelling, deformity or tenderness  Integumentary: No rashes, no abnormal skin lesions, no petechiae  Neurologic: reflexes normal and symmetric, no cranial nerve deficit  Psych:  Orientation, sensorium, mood normal            Lines: IV  Vulvar lesion local redness, inguinal adenopathy +        Data Review:    CBC:   Lab Results   Component Value Date    WBC 19.4 (H) 06/01/2022    HGB 9.1 (L) 06/01/2022    HCT 29.4 (L) 06/01/2022    MCV 81.9 06/01/2022     06/01/2022     RENAL:   Lab Results   Component Value Date    CREATININE <0.5 (L) 06/01/2022    BUN 10 06/01/2022     06/01/2022    K 3.8 06/01/2022     06/01/2022    CO2 22 06/01/2022     SED RATE: No results found for: SEDRATE  CK: No results found for: CKTOTAL  CRP: No results found for: CRP  Hepatic Function Panel:   Lab Results   Component Value Date    ALKPHOS 82 06/01/2022    ALT 37 06/01/2022    AST 26 06/01/2022    PROT 4.9 06/01/2022    BILITOT <0.2 06/01/2022    LABALBU 1.9 06/01/2022     UA:  Lab Results   Component Value Date    COLORU Yellow 05/29/2022    CLARITYU TURBID 05/29/2022    GLUCOSEU Negative 05/29/2022    BILIRUBINUR Negative 05/29/2022    BILIRUBINUR NEG 10/20/2020    KETUA 15 05/29/2022    SPECGRAV 1.026 05/29/2022    BLOODU LARGE 05/29/2022    PHUR 5.0 05/29/2022    PROTEINU 30 05/29/2022    UROBILINOGEN 0.2 05/29/2022    NITRU Negative 05/29/2022    LEUKOCYTESUR MODERATE 05/29/2022    LABMICR YES 05/29/2022    URINETYPE NotGiven 05/29/2022      Urine Microscopic:   Lab Results   Component Value Date    BACTERIA 4+ 05/29/2022    COMU see below 05/29/2022    HYALCAST 17 05/29/2022    WBCUA 289 05/29/2022    RBCUA 169 05/29/2022    EPIU 14 05/29/2022     Urine Reflex to Culture:   Lab Results   Component Value Date    URRFLXCULT Yes 05/29/2022         MICRO: cultures reviewed and updated by me   Blood Culture:   Lab Results   Component Value Date    [facility] SYSTEM  05/29/2022     No Growth to date. Any change in status will be called. BLOODCULT2  05/29/2022     No Growth to date. Any change in status will be called. Respiratory Culture:  No results found for: Tyler Belle  AFB:No results found for: AFBSMEAR  Viral Culture:  No results found for: COVID19  Urine Culture:   Recent Labs     05/29/22  1821   LABURIN >50,000 CFU/ml mixed skin/urogenital josselyn. No further workup*  >100,000 CFU/ml  CONTACT PRECAUTIONS INDICATED  Carbapenem antibiotics are the best option for infections caused  by ESBL producing organisms. Other antibiotic classes are  likely to result in treatment failure, even for organisms  demonstrating in vitro susceptibility.   * Micro results (current encounter only)    Procedure Component Value Units Date/Time   Culture, Urine [[id number]] (Abnormal)  Collected: 05/29/22 1821   Order Status: Completed Specimen: Urine, clean catch Updated: 06/01/22 0810    Urine Culture, Routine >50,000 CFU/ml mixed skin/urogenital josselyn. No further workup Abnormal     Organism Escherichia coli ESBL Abnormal     Urine Culture, Routine -- Abnormal     >100,000 CFU/ml   CONTACT PRECAUTIONS INDICATED   Carbapenem antibiotics are the best option for infections caused   by ESBL producing organisms.  Other antibiotic classes are   likely to result in treatment failure, even for organisms   demonstrating in vitro susceptibility.    Abnormal    Narrative:     ORDER#: [id number]                          ORDERED BY: Charmayne Pavy   SOURCE: Urine Clean Catch                  COLLECTED:  05/29/22 18:21   ANTIBIOTICS AT [facility]. :                      RECEIVED :  05/29/22 19:20   CALL  [address] tel. [phone],   Microbiology results called to and read back by RAO Chambers,   06/01/2022 08:09, by Alessandro Cano   Culture, Blood 1 [[id number]] Collected: 05/29/22 1751   Order Status: Completed Specimen: Blood Updated: 05/30/22 1915    Blood Culture, Routine No Growth to date.  Any change in status will be called. Narrative:     ORDER#: [id number]                          ORDERED BY: MARY ANNE Kowalski   SOURCE: Blood                              COLLECTED:  05/29/22 17:51   ANTIBIOTICS AT [facility]. :                      RECEIVED :  05/29/22 17:59   If child <=2 yrs old please draw pediatric bottle. ~Blood Culture 1   Culture, Blood 2 [[id number]] Collected: 05/29/22 1819   Order Status: Completed Specimen: Blood Updated: 05/30/22 1915    Culture, Blood 2 No Growth to date.  Any change in status will be called.    Narrative:     ORDER#: [id number]                          ORDERED BY: MARY ANNE Kowalski   SOURCE: Blood                              COLLECTED:  05/29/22 18:19   ANTIBIOTICS AT [facility].:                      RECEIVED :  05/29/22 18:23   If child <=2 yrs old please draw pediatric bottle. ~Blood Culture #2     Escherichia coli (esbl) (1)    Antibiotic Interpretation Microscan  Method Status    ampicillin Resistant >=32 mcg/mL BACTERIAL SUSCEPTIBILITY PANEL BY JESSIE     ampicillin-sulbactam Resistant >=32 mcg/mL BACTERIAL SUSCEPTIBILITY PANEL BY JESSIE     ceFAZolin Resistant >=64 mcg/mL BACTERIAL SUSCEPTIBILITY PANEL BY JESSIE     cefepime Resistant   BACTERIAL SUSCEPTIBILITY PANEL BY JESSIE     cefTRIAXone Resistant >=64 mcg/mL BACTERIAL SUSCEPTIBILITY PANEL BY JESSIE     ciprofloxacin Resistant >=4 mcg/mL BACTERIAL SUSCEPTIBILITY PANEL BY JESSIE     ertapenem Sensitive <=0.12 mcg/mL BACTERIAL SUSCEPTIBILITY PANEL BY JESSIE     gentamicin Sensitive <=1 mcg/mL BACTERIAL SUSCEPTIBILITY PANEL BY JESSIE     levofloxacin Resistant >=8 mcg/mL BACTERIAL SUSCEPTIBILITY PANEL BY JESSIE     nitrofurantoin Sensitive <=16 mcg/mL BACTERIAL SUSCEPTIBILITY PANEL BY JESSIE             IMAGING:    CT CHEST ABDOMEN PELVIS W CONTRAST   Final Result   1. No acute intrathoracic abnormality. 2. Large heterogeneously enhancing mass in the right vulva/perineum is   concerning for a malignant process. There is also gas within this lesion,   suggesting ulceration. Clinical correlation and tissue sampling are   recommended for further evaluation. 3. Bilateral inguinal lymphadenopathy. 4. Enlarged, heterogeneous thyroid. Ultrasound is recommended. RECOMMENDATIONS:   Incidental heterogeneous and enlarged thyroid. Recommend thyroid US. Reference: J Am Riana Radiol. 2015 Feb;12(2): 143-50         CT Cervical Spine WO Contrast   Final Result   1. No acute abnormality of the cervical spine. 2. Multilevel degenerative changes. RECOMMENDATIONS:   1 cm incidental right thyroid nodule. No follow-up imaging is recommended. Reference: J Am Riana Radiol.  2015 Feb;12(2): 143-50         CT Head WO Contrast   Final Result   No acute intracranial abnormality. XR CHEST PORTABLE   Final Result   No acute cardiopulmonary abnormality.          US THYROID    (Results Pending)         All the pertinent images and reports for the current Hospitalization were reviewed by me     Scheduled Meds:   meropenem  500 mg IntraVENous Q6H    ferrous sulfate  324 mg Oral Every Other Day    insulin glargine  18 Units SubCUTAneous Nightly    rosuvastatin  5 mg Oral Nightly    gabapentin  300 mg Oral QAM    And    gabapentin  300 mg Oral Lunch    And    gabapentin  300 mg Oral QPM    And    gabapentin  900 mg Oral Nightly    insulin lispro  0-12 Units SubCUTAneous TID WC    insulin lispro  0-6 Units SubCUTAneous Nightly    sodium chloride flush  10 mL IntraVENous 2 times per day       Continuous Infusions:   dextrose      sodium chloride 5 mL/hr at 06/01/22 0028    sodium chloride 100 mL/hr at 06/01/22 0025       PRN Meds:  traMADol, oxyCODONE, glucose, dextrose bolus **OR** dextrose bolus, glucagon (rDNA), dextrose, sodium chloride flush, sodium chloride, ondansetron, polyethylene glycol, acetaminophen **OR** acetaminophen      Assessment:     Patient Active Problem List   Diagnosis    DMII (diabetes mellitus, type 2) ([facility] [address]    Duodenal ulcer    Pain in both lower extremities    Varicosities of leg    Elevated blood pressure reading    Mixed hyperlipidemia    Type 2 diabetes mellitus with diabetic neuropathy    Acute cystitis with hematuria    Severe sepsis (HCC)    Septic shock (HCC)    Tachycardia    Neutrophilic leukocytosis    Elevated lactic acid level    Elevated liver function tests    Generalized weakness    Fatigue    Vulvar mass    Enlarged, heterogeneous thyroid    Severe malnutrition (HCC)     Sepsis  WBC elevation   Lactic acidosis  UTI  E coli on urine cx  Vulvar mass fungating  Concern for advanced Cancer  Inguinal adenopathy  Lactic acidosis  Hyper calcemia  Wt loss   DM+     Unfortunately progression of the mass with possible secondary infection and UTI - WILL nee biopsy to establish the diagnosis and will cont IV abx for UTI and secondary bacterial infection   ESBL on urine cx IV abx change to IV Meropenem    OPAT d/w pt she wants to take only oral abx will see if we can choose oral Fosfomycin and other abx to cover cellulitis when ready     Labs, Microbiology, Radiology and all the pertinent results from current hospitalization and  care every where were reviewed  by me as a part of the evaluation   Plan:   1. Change to IV Meropenem x 500 MG q 6 HRS  2. OPAT d/w pt not interested  3. Will choose oral Fosfomycin powder for ESBL Cystitis   4. Trend WBC   5. Correct Hypercalcemia  6. Will add oral Levofloxacin + Flagyl for the secondary bacterial infection in the groin area at d/c      Discussed with patient/Family and Nursing   Risk of Complications/Morbidity: High      · Illness(es)/ Infection present that pose threat to bodily function. · There is potential for severe exacerbation of infection/side effects of treatment. · Therapy requires intensive monitoring for antimicrobial agent toxicity. Discussed with patient/Family and Nursing staff     Thanks for allowing me to participate in your patient's care and please call me with any questions or concerns.     Araceli Naylor MD  Infectious Disease  [address] Physician  Phone: [phone]   Fax : [phone]

## 2022-06-02 NOTE — ACP (ADVANCE CARE PLANNING)
Advance Care Planning     Advance Care Planning Activator (Inpatient)  Conversation Note      Date of ACP Conversation: 6/2/2022     Conversation Conducted with: Patient with Decision Making Capacity    ACP Activator: Lolis Lange:     Current Designated Health Care Decision Maker:     Primary Decision Maker: Tamir Kendellalthea 474 - [phone]    Secondary Decision Maker: Hair  - [phone]    Supplemental (Other) Decision Maker: Brianne Sosa - Brother/Sister - [phone]    Care Preferences    Ventilation: \"If you were in your present state of health and suddenly became very ill and were unable to breathe on your own, what would your preference be about the use of a ventilator (breathing machine) if it were available to you? \"      Would the patient desire the use of ventilator (breathing machine)?: no    \"If your health worsens and it becomes clear that your chance of recovery is unlikely, what would your preference be about the use of a ventilator (breathing machine) if it were available to you? \"     Would the patient desire the use of ventilator (breathing machine)?: No      Resuscitation  \"CPR works best to restart the heart when there is a sudden event, like a heart attack, in someone who is otherwise healthy. Unfortunately, CPR does not typically restart the heart for people who have serious health conditions or who are very sick. \"    \"In the event your heart stopped as a result of an underlying serious health condition, would you want attempts to be made to restart your heart (answer \"yes\" for attempt to resuscitate) or would you prefer a natural death (answer \"no\" for do not attempt to resuscitate)? \" no       [] Yes   [x] No   Educated Patient / Cely Givens regarding differences between Advance Directives and portable DNR orders.     Length of ACP Conversation in minutes:   5    Conversation Outcomes:  [x] ACP discussion completed  [] Existing advance directive reviewed with patient; no changes to patient's previously recorded wishes  [] New Advance Directive completed  [] Portable Do Not Rescitate prepared for Provider review and signature  [] POLST/POST/MOLST/MOST prepared for Provider review and signature      Follow-up plan:    [] Schedule follow-up conversation to continue planning  [] Referred individual to Provider for additional questions/concerns   [] Advised patient/agent/surrogate to review completed ACP document and update if needed with changes in condition, patient preferences or care setting    [x] This note routed to one or more involved healthcare providers    Wendy HEAD RN  Case Management  [phone]    Electronically signed by Wendy Ceron RN on 6/2/2022 at 3:29 PM

## 2022-06-02 NOTE — PROGRESS NOTES
General Surgery    Asked to see for port placement    Patient hoping for discharge and outpatient placement    Will arrange for next week and contract patient with scheduling information    Please call with any questions or change in plans    Electronically signed by Abhishek Manriquez MD on 6/2/2022 at 3:06 PM

## 2022-06-02 NOTE — CARE COORDINATION
INITIAL CASE MANAGEMENT ASSESSMENT    Met with patient to assess possible discharge needs. Explained Case Management role/services. Living Situation: Lives with spouse in a one story house with 1 DON. ADLs: Independent     DME: N/A    PT/OT Recs: Recommendations for PT/OT at home. Active Services: N/A     Transportation: Active . Spouse, daughter, or sister will provide transportation to home. Medications: [address] [facility]    PCP: Laurel Cole      HD/PD: N/A    PLAN/COMMENTS: CM met with the patient to discuss discharge planning. Patient will need a FWW at discharge. Discharge plan is to return to home with spouse. Recommendations for PT/OT at home, patient is open to services. Provided contact information for patient or family to call with any questions. Will follow and assist as needed.     Keiko HEAD RN  Case Management  [phone]    Electronically signed by Keiko Gambino RN on 6/2/2022 at 3:24 PM

## 2022-06-02 NOTE — PROGRESS NOTES
No new complaints  Had sim today  Vulvar biopsy performed two 6mm sepcimens submitted from the least necroticarea  Prepped with betadine 1.0 cc lidocaine  AgNO4 hemostasis  Well tolerated

## 2022-06-02 NOTE — PLAN OF CARE
Problem: Discharge Planning  Goal: Discharge to home or other facility with appropriate resources  Outcome: Progressing     Problem: Pain  Goal: Verbalizes/displays adequate comfort level or baseline comfort level  Outcome: Progressing     Problem: Skin/Tissue Integrity  Goal: Absence of new skin breakdown  Description: 1. Monitor for areas of redness and/or skin breakdown  2. Assess vascular access sites hourly  3. Every 4-6 hours minimum:  Change oxygen saturation probe site  4. Every 4-6 hours:  If on nasal continuous positive airway pressure, respiratory therapy assess nares and determine need for appliance change or resting period.   Outcome: Progressing     Problem: Safety - Adult  Goal: Free from fall injury  Outcome: Progressing     Problem: Metabolic/Fluid and Electrolytes - Adult  Goal: Electrolytes maintained within normal limits  Outcome: Progressing  Goal: Hemodynamic stability and optimal renal function maintained  Outcome: Progressing  Goal: Glucose maintained within prescribed range  Outcome: Progressing     Problem: ABCDS Injury Assessment  Goal: Absence of physical injury  Outcome: Progressing     Problem: Chronic Conditions and Co-morbidities  Goal: Patient's chronic conditions and co-morbidity symptoms are monitored and maintained or improved  Outcome: Progressing

## 2022-06-02 NOTE — PROGRESS NOTES
Occupational Therapy  Pt seen in room but sound asleep. Deferred OT treatment to rest for now. Will attempt later as schedule allows. Torey Mendietapelon, OTR/L #[id number] 6/2/2022 2:45PM     Attempted second time and pt awakened easily but declined working with OT. \"I just need a good rest\". Pt stated she anticipates that she could go home tomorrow (nothing in chart indicates that) but acknowledges that she needs to [facility] to get out of here\" but still declined. Will attempt tomorrow as schedule allows.   Lucaskati Avery, OTR/L #[id number] 6/2/2022 3:06 PM

## 2022-06-02 NOTE — PROGRESS NOTES
Infectious Disease Follow up Notes  Admit Date: 5/29/2022  Hospital Day: 5    Antibiotics :   IV Meropenem     CHIEF COMPLAINT:   '  Sepsis  UTI  Esbl  Vulvar cancer necrotic and extensive    WBC elevation    Subjective interval History :  77 y.o. Woman admitted with large fungating vulvar mass with a concern for malignancy, she has on going drainage with odor, on gong for months and pt was fearful not to seek medical attention, WBC at  25.2 , lactic acid 2, Calcium 13.9, UA very abnormal and urine cx  E coli noted and CT abd/pelvis with large fungating mass from the vulvar area- seen by Gynecology we are consulted for IV abx recommendations. Location :  Vulvar area pain, pelvic pain     Quality :aching         Severity :10/10     Duration :  Weeks      Timing : constant   Context : mass in the vulvar area     Modifying factors : none    Associated signs and symptoms:  No fevers no chills,     Interval History :  Urine cx ESBL noted and c/o lower abd pain and pain from the Necrotic vulvar cancer its extending to the gluteal area and medial thigh with some bloody drainage+     Past Medical History:    Past Medical History:   Diagnosis Date    Congenital prolapsed rectum     Displaced bladder     Enlarged, heterogeneous thyroid 5/29/2022    Hernia     Mixed hyperlipidemia 10/14/2019    Severe malnutrition ([address] 5/31/2022    Type 2 diabetes mellitus without complication ([address] 5/21/0888    Varicose vein of leg        Past Surgical History:    History reviewed. No pertinent surgical history.     Current Medications:    Outpatient Medications Marked as Taking for the 5/29/22 encounter [facility] Encounter)   Medication Sig Dispense Refill    insulin aspart (NOVOLOG) 100 UNIT/ML injection vial Inject 5-6 Units into the skin daily      acetaminophen (TYLENOL) 325 mg tablet Take 650 mg by mouth every 6 hours as needed for Pain      chlorpheniramine (SB CHLORPHENIRAMINE) 4 MG tablet Take 4 mg by mouth every 6 hours as needed for Allergies      Homeopathic Products (RESTFUL LEGS SL) Place 1 tablet under the tongue 4 times daily         Allergies:  Patient has no known allergies.     Immunizations :   Immunization History   Administered Date(s) Administered    COVID-19, Pfizer Purple top, DILUTE for use, 12+ yrs, 30mcg/0.3mL dose 03/11/2021, 04/01/2021, 10/22/2021    Pneumococcal Polysaccharide ([id number]) 10/20/2020, 10/20/2020       Social History:    Social History     Tobacco Use    Smoking status: Never Smoker    Smokeless tobacco: Never Used   Substance Use Topics    Alcohol use: No     Alcohol/week: 0.0 standard drinks    Drug use: No     Social History     Tobacco Use   Smoking Status Never Smoker   Smokeless Tobacco Never Used      Family History   Problem Relation Age of Onset    High Blood Pressure Mother     Hypertension Mother     High Cholesterol Mother     Macular Degen Mother     Thyroid Disease Mother     Cancer Father     Alcohol Abuse Father     High Blood Pressure Sister     High Cholesterol Sister     Thyroid Disease Sister     Heart Disease Brother     Alcohol Abuse Brother        REVIEW OF SYSTEMS:     Constitutional:  negative for fevers, chills, night sweats  Eyes:  negative for blurred vision, eye discharge, visual disturbance   HEENT:  negative for hearing loss, ear drainage,nasal congestion  Respiratory:  negative for cough, shortness of breath or hemoptysis   Cardiovascular:  negative for chest pain, palpitations, syncope  Gastrointestinal:  negative for nausea, vomiting, diarrhea, constipation, abdominal pain++   Genitourinary:  negative for frequency, dysuria+  urinary incontinence, hematuria  Hematologic/Lymphatic:  negative for easy bruising, bleeding and lymphadenopathy  Allergic/Immunologic:  negative for recurrent infections, angioedema, anaphylaxis   Endocrine:  negative for weight changes, polyuria, polydipsia and polyphagia  Musculoskeletal: Rt groin and gluteal pain and vulvar pain ++, swelling, decreased range of motion  Integumentary: No rashes, skin lesions  Neurological:  negative for headaches, slurred speech, unilateral weakness  Psychiatric: negative for hallucinations,confusion,agitation.                 PHYSICAL EXAM:      Vitals:    /71   Pulse 94   Temp 98.4 °F (36.9 °C) (Oral)   Resp 20   Ht 5' 1\" (1.549 m)   Wt 130 lb 1.1 oz (59 kg)   SpO2 92%   BMI 24.58 kg/m²     General Appearance: alert,in some acute distress, ++  pallor, no icterus chronically ill appearing woman ++   Skin: warm and dry, no rash or erythema  Head: normocephalic and atraumatic  Eyes: pupils equal, round, and reactive to light, conjunctivae normal  ENT: tympanic membrane, external ear and ear canal normal bilaterally, nose without deformity, nasal mucosa and turbinates normal without polyps  Neck: supple and non-tender without mass, no thyromegaly  no cervical lymphadenopathy  Pulmonary/Chest: clear to auscultation bilaterally- no wheezes, rales or rhonchi, normal air movement, no respiratory distress  Cardiovascular: normal rate, regular rhythm, normal S1 and S2, no murmurs, rubs, clicks, or gallops, no carotid bruits  Abdomen: soft, non-tender, non-distended, normal bowel sounds, no masses or organomegaly  Extremities: no cyanosis, clubbing or edema  Musculoskeletal: normal range of motion, no joint swelling, deformity or tenderness  Integumentary: No rashes, no abnormal skin lesions, no petechiae  Neurologic: reflexes normal and symmetric, no cranial nerve deficit  Psych:  Orientation, sensorium, mood normal            Lines: IV  Vulvar lesion local redness, inguinal adenopathy +  NECROTIC TUMOR extending to the gluteal and upper medial thigh with on going pain and drainage+         Data Review:    CBC:   Lab Results   Component Value Date    WBC 22.4 (H) 06/02/2022    HGB 8.5 (L) 06/02/2022    HCT 27.0 (L) 06/02/2022    MCV 81.0 06/02/2022     06/02/2022     RENAL:   Lab Results   Component Value Date    CREATININE <0.5 (L) 06/02/2022    BUN 8 06/02/2022     06/02/2022    K 3.6 06/02/2022     06/02/2022    CO2 24 06/02/2022     SED RATE: No results found for: SEDRATE  CK: No results found for: CKTOTAL  CRP: No results found for: CRP  Hepatic Function Panel:   Lab Results   Component Value Date    ALKPHOS 78 06/02/2022    ALT 28 06/02/2022    AST 17 06/02/2022    PROT 4.6 06/02/2022    BILITOT <0.2 06/02/2022    LABALBU 2.0 06/02/2022     UA:  Lab Results   Component Value Date    COLORU Yellow 05/29/2022    CLARITYU TURBID 05/29/2022    GLUCOSEU Negative 05/29/2022    BILIRUBINUR Negative 05/29/2022    BILIRUBINUR NEG 10/20/2020    KETUA 15 05/29/2022    SPECGRAV 1.026 05/29/2022    BLOODU LARGE 05/29/2022    PHUR 5.0 05/29/2022    PROTEINU 30 05/29/2022    UROBILINOGEN 0.2 05/29/2022    NITRU Negative 05/29/2022    LEUKOCYTESUR MODERATE 05/29/2022    LABMICR YES 05/29/2022    URINETYPE NotGiven 05/29/2022      Urine Microscopic:   Lab Results   Component Value Date    BACTERIA 4+ 05/29/2022    COMU see below 05/29/2022    HYALCAST 17 05/29/2022    WBCUA 289 05/29/2022    RBCUA 169 05/29/2022    EPIU 14 05/29/2022     Urine Reflex to Culture:   Lab Results   Component Value Date    URRFLXCULT Yes 05/29/2022         MICRO: cultures reviewed and updated by me   Blood Culture:   Lab Results   Component Value Date    [facility]  05/29/2022     No Growth to date. Any change in status will be called. BLOODCULT2  05/29/2022     No Growth to date. Any change in status will be called. Respiratory Culture:  No results found for: Fanny Dorantes  AFB:No results found for: AFBSMEAR  Viral Culture:  No results found for: COVID19  Urine Culture:   No results for input(s): Therisa Hockey in the last 72 hours.          Micro results (current encounter only)    Procedure Component Value Units Date/Time Culture, Urine [[id number]] (Abnormal)  Collected: 05/29/22 1821   Order Status: Completed Specimen: Urine, clean catch Updated: 06/01/22 0810    Urine Culture, Routine >50,000 CFU/ml mixed skin/urogenital josselyn. No further workup Abnormal     Organism Escherichia coli ESBL Abnormal     Urine Culture, Routine -- Abnormal     >100,000 CFU/ml   CONTACT PRECAUTIONS INDICATED   Carbapenem antibiotics are the best option for infections caused   by ESBL producing organisms.  Other antibiotic classes are   likely to result in treatment failure, even for organisms   demonstrating in vitro susceptibility.    Abnormal    Narrative:     ORDER#: [id number]                          ORDERED BY: Tsahia Saha   SOURCE: Urine Clean Catch                  COLLECTED:  05/29/22 18:21   ANTIBIOTICS AT [facility]. :                      RECEIVED :  05/29/22 19:20   CALL  [address] tel. [phone],   Microbiology results called to and read back by RAO Gant,   06/01/2022 08:09, by Lynnette Pugh   Culture, Blood 1 [[id number]] Collected: 05/29/22 1751   Order Status: Completed Specimen: Blood Updated: 05/30/22 1915    Blood Culture, Routine No Growth to date.  Any change in status will be called. Narrative:     ORDER#: [id number]                          ORDERED BY: MARY ANNE Villatoro   SOURCE: Blood                              COLLECTED:  05/29/22 17:51   ANTIBIOTICS AT [facility]. :                      RECEIVED :  05/29/22 17:59   If child <=2 yrs old please draw pediatric bottle. ~Blood Culture 1   Culture, Blood 2 [[id number]] Collected: 05/29/22 1819   Order Status: Completed Specimen: Blood Updated: 05/30/22 1915    Culture, Blood 2 No Growth to date.  Any change in status will be called. Narrative:     ORDER#: [id number]                          ORDERED BY: MARY ANNE STARR   SOURCE: Blood                              COLLECTED:  05/29/22 18:19   ANTIBIOTICS AT [facility]. :                      RECEIVED :  05/29/22 18:23   If child <=2 yrs old please draw pediatric bottle. ~Blood Culture #2     Escherichia coli (esbl) (1)    Antibiotic Interpretation Microscan  Method Status    ampicillin Resistant >=32 mcg/mL BACTERIAL SUSCEPTIBILITY PANEL BY JESSIE     ampicillin-sulbactam Resistant >=32 mcg/mL BACTERIAL SUSCEPTIBILITY PANEL BY JESSIE     ceFAZolin Resistant >=64 mcg/mL BACTERIAL SUSCEPTIBILITY PANEL BY JESSIE     cefepime Resistant   BACTERIAL SUSCEPTIBILITY PANEL BY JESSIE     cefTRIAXone Resistant >=64 mcg/mL BACTERIAL SUSCEPTIBILITY PANEL BY JESSIE     ciprofloxacin Resistant >=4 mcg/mL BACTERIAL SUSCEPTIBILITY PANEL BY JESSIE     ertapenem Sensitive <=0.12 mcg/mL BACTERIAL SUSCEPTIBILITY PANEL BY JESSIE     gentamicin Sensitive <=1 mcg/mL BACTERIAL SUSCEPTIBILITY PANEL BY JESSIE     levofloxacin Resistant >=8 mcg/mL BACTERIAL SUSCEPTIBILITY PANEL BY JESSIE     nitrofurantoin Sensitive <=16 mcg/mL BACTERIAL SUSCEPTIBILITY PANEL BY JESSIE             IMAGING:    US THYROID   Final Result   Multiple thyroid nodules. There is a very hypoechoic peripherally calcified   TR 5 15 mm right thyroid nodule which meets criteria for FNA. Additional TR 4 nodule in the right thyroid meets criteria for follow-up but   not biopsy. TR 3 nodules in the left thyroid do not meet criteria for follow-up or biopsy. See recommendations below. RECOMMENDATIONS:   ACR TI-RADS recommendations:      TR5 (>= 7 points):  FNA if >= 1 cm; follow-up if 0.5-0.9 cm in 1, 2, 3, 4,   and 5 years      TR4 (4-6 points):  FNA if >= 1.5 cm; follow-up if 1.0-1.4 cm in 1, 2, 3, and   5 years      TR3 (3 points):  FNA if >= 2.5 cm; follow-up if 1.5-2.4 cm in 1, 3, and 5   years      TR2 (2 points):  No FNA or follow-up      TR1 (0 points):  No FNA or follow-up      ACR TI-RADS recommends that no more than two nodules with the highest ACR   TI-RADS point total should be biopsied and no more than four nodules should   be followed. CT CHEST ABDOMEN PELVIS W CONTRAST   Final Result   1.  No acute intrathoracic abnormality. 2. Large heterogeneously enhancing mass in the right vulva/perineum is   concerning for a malignant process. There is also gas within this lesion,   suggesting ulceration. Clinical correlation and tissue sampling are   recommended for further evaluation. 3. Bilateral inguinal lymphadenopathy. 4. Enlarged, heterogeneous thyroid. Ultrasound is recommended. RECOMMENDATIONS:   Incidental heterogeneous and enlarged thyroid. Recommend thyroid US. Reference: J Am Riana Radiol. 2015 Feb;12(2): 143-50         CT Cervical Spine WO Contrast   Final Result   1. No acute abnormality of the cervical spine. 2. Multilevel degenerative changes. RECOMMENDATIONS:   1 cm incidental right thyroid nodule. No follow-up imaging is recommended. Reference: J Am Riana Radiol. 2015 Feb;12(2): 143-50         CT Head WO Contrast   Final Result   No acute intracranial abnormality. XR CHEST PORTABLE   Final Result   No acute cardiopulmonary abnormality.                All the pertinent images and reports for the current Hospitalization were reviewed by me     Scheduled Meds:   meropenem  500 mg IntraVENous Q6H    ferrous sulfate  324 mg Oral Every Other Day    insulin glargine  18 Units SubCUTAneous Nightly    rosuvastatin  5 mg Oral Nightly    gabapentin  300 mg Oral QAM    And    gabapentin  300 mg Oral Lunch    And    gabapentin  300 mg Oral QPM    And    gabapentin  900 mg Oral Nightly    insulin lispro  0-12 Units SubCUTAneous TID WC    insulin lispro  0-6 Units SubCUTAneous Nightly    sodium chloride flush  10 mL IntraVENous 2 times per day       Continuous Infusions:   dextrose      sodium chloride Stopped (06/01/22 0805)       PRN Meds:  traMADol, oxyCODONE, glucose, dextrose bolus **OR** dextrose bolus, glucagon (rDNA), dextrose, sodium chloride flush, sodium chloride, ondansetron, polyethylene glycol, acetaminophen **OR** acetaminophen      Assessment:     Patient Active Problem Risk of Complications/Morbidity: High      · Illness(es)/ Infection present that pose threat to bodily function. · There is potential for severe exacerbation of infection/side effects of treatment. · Therapy requires intensive monitoring for antimicrobial agent toxicity. Discussed with patient/Family and Nursing staff     Thanks for allowing me to participate in your patient's care and please call me with any questions or concerns.     Matthieu Vizcaino MD  Infectious Disease  [facility] ([facility]) Physician  Phone: [phone]   Fax : [phone]

## 2022-06-03 NOTE — DISCHARGE SUMMARY
Hospital Medicine Discharge Summary    Patient ID: Reji Monteiro      Patient's PCP: Paul Gutierrez, APRN - CNP    Admit Date: 5/29/2022     Discharge Date:   06/03/2022    Admitting Provider: Atul Alvarez MD     Discharge Provider: Nelly Espinoza MD     Discharge Diagnoses: Active Hospital Problems    Diagnosis     Severe malnutrition ([facility] [E43]      Priority: Medium    Acute cystitis with hematuria [N30.01]      Priority: Medium    Severe sepsis ([facility] [A41.9, R65.20]      Priority: Medium    Septic shock ([facility] [A41.9, R65.21]      Priority: Medium    Tachycardia [R00.0]      Priority: Medium    Neutrophilic leukocytosis [M27.3]      Priority: Medium    Elevated lactic acid level [R79.89]      Priority: Medium    Elevated liver function tests [R79.89]      Priority: Medium    Generalized weakness [R53.1]      Priority: Medium    Fatigue [R53.83]      Priority: Medium    Vulvar mass [N90.89]      Priority: Medium    Enlarged, heterogeneous thyroid [E04.9]      Priority: Medium    Mixed hyperlipidemia [E78.2]     DMII (diabetes mellitus, type 2) ([facility] [E11.9]        The patient was seen and examined on day of discharge and this discharge summary is in conjunction with any daily progress note from day of discharge. Hospital Course:     From HPI:\"Pt is an 77y.o. year-old female with a history of hyperlipidemia and diabetes mellitus. She presents to the emergency room for evaluation following a 2 to 3-week history of various somatic symptoms which include generalized weakness of her extremities, increasing fatigue, a decreased appetite and a mass in her vulva/perineal area. She reports falling last Friday and hitting her head in her bathroom floor. She did not have loss of consciousness. A CT of her head and a CT of her cervical spine had no abnormal findings.   A CT of her chest, abdomen and pelvis revealed a, \"Large heterogeneously enhancing mass in the right improving, alendronate given by oncology  10.  Anemia, no obvious bleeding, suspecting due to vulvar mass which is highly suspicious for malignancy along with necrotic tissue. Physical Exam Performed:     /61   Pulse 82   Temp 98.7 °F (37.1 °C) (Oral)   Resp 16   Ht 5' 1\" (1.549 m)   Wt 127 lb 13.9 oz (58 kg)   SpO2 92%   BMI 24.16 kg/m²       General appearance:  No apparent distress  HEENT:  Normal cephalic  Neck: Supple  Respiratory:  Normal respiratory effort. Clear to auscultation, bilaterally without Rales/Wheezes/Rhonchi. Cardiovascular:  Regular rate and rhythm with normal S1/S2 without murmurs, rubs or gallops. Abdomen: Soft, non-tender, non-distended  Musculoskeletal:  No clubbing, cyanosis  Skin: Skin color, texture, turgor normal.  No rashes or lesions. Neurologic: Focal weakness. Psychiatric:  Alert and oriented  Capillary Refill: Brisk,< 3 seconds   Peripheral Pulses: +2 palpable, equal bilaterally       Labs: For convenience and continuity at follow-up the following most recent labs are provided:      CBC:    Lab Results   Component Value Date    WBC 20.2 06/03/2022    HGB 8.4 06/03/2022    HCT 26.6 06/03/2022     06/03/2022       Renal:    Lab Results   Component Value Date     06/03/2022    K 3.8 06/03/2022     06/03/2022    CO2 25 06/03/2022    BUN 9 06/03/2022    CREATININE <0.5 06/03/2022    CALCIUM 8.1 06/03/2022    PHOS 3.1 11/25/2015         Significant Diagnostic Studies    Radiology:   US THYROID   Final Result   Multiple thyroid nodules. There is a very hypoechoic peripherally calcified   TR 5 15 mm right thyroid nodule which meets criteria for FNA. Additional TR 4 nodule in the right thyroid meets criteria for follow-up but   not biopsy. TR 3 nodules in the left thyroid do not meet criteria for follow-up or biopsy. See recommendations below.       RECOMMENDATIONS:   ACR TI-RADS recommendations:      TR5 (>= 7 points):  FNA if >= 1 cm; follow-up if 0.5-0.9 cm in 1, 2, 3, 4,   and 5 years      TR4 (4-6 points):  FNA if >= 1.5 cm; follow-up if 1.0-1.4 cm in 1, 2, 3, and   5 years      TR3 (3 points):  FNA if >= 2.5 cm; follow-up if 1.5-2.4 cm in 1, 3, and 5   years      TR2 (2 points):  No FNA or follow-up      TR1 (0 points):  No FNA or follow-up      ACR TI-RADS recommends that no more than two nodules with the highest ACR   TI-RADS point total should be biopsied and no more than four nodules should   be followed. CT CHEST ABDOMEN PELVIS W CONTRAST   Final Result   1. No acute intrathoracic abnormality. 2. Large heterogeneously enhancing mass in the right vulva/perineum is   concerning for a malignant process. There is also gas within this lesion,   suggesting ulceration. Clinical correlation and tissue sampling are   recommended for further evaluation. 3. Bilateral inguinal lymphadenopathy. 4. Enlarged, heterogeneous thyroid. Ultrasound is recommended. RECOMMENDATIONS:   Incidental heterogeneous and enlarged thyroid. Recommend thyroid US. Reference: J Am Riana Radiol. 2015 Feb;12(2): 143-50         CT Cervical Spine WO Contrast   Final Result   1. No acute abnormality of the cervical spine. 2. Multilevel degenerative changes. RECOMMENDATIONS:   1 cm incidental right thyroid nodule. No follow-up imaging is recommended. Reference: J Am Riana Radiol. 2015 Feb;12(2): 143-50         CT Head WO Contrast   Final Result   No acute intracranial abnormality. XR CHEST PORTABLE   Final Result   No acute cardiopulmonary abnormality.                 Consults:     IP CONSULT TO HOSPITALIST  IP CONSULT TO OB GYN  IP CONSULT TO OB GYN  IP CONSULT TO SPIRITUAL SERVICES  IP CONSULT TO SOCIAL WORK  IP CONSULT TO DIETITIAN  IP CONSULT TO INFECTIOUS DISEASES  IP CONSULT TO RADIATION ONCOLOGY  IP CONSULT TO PALLIATIVE CARE  IP CONSULT TO SPIRITUAL SERVICES  IP CONSULT TO GI  IP CONSULT TO GENERAL SURGERY    Disposition:  HOME Condition at Discharge: Stable    Discharge Instructions/Follow-up: PCP, oncology, gynecology, endocrinology, general surgery    Code Status:  Limited     Activity: activity as tolerated    Diet: diabetic diet      Discharge Medications:     Current Discharge Medication List           Details   ferrous sulfate 324 (65 Fe) MG EC tablet Take 1 tablet by mouth every other day for 28 doses  Qty: 30 tablet, Refills: 0      polyethylene glycol (GLYCOLAX) 17 g packet Take 17 g by mouth daily as needed for Constipation  Qty: 527 g, Refills: 0      fosfomycin tromethamine (MONUROL) 3 g PACK Take 1 packet by mouth once for 1 dose  Qty: 1 each, Refills: 0      metroNIDAZOLE (FLAGYL) 500 MG tablet Take 1 tablet by mouth 3 times daily for 10 days  Qty: 30 tablet, Refills: 0      levoFLOXacin (LEVAQUIN) 500 MG tablet Take 1 tablet by mouth daily for 7 days  Qty: 7 tablet, Refills: 0              Details   insulin glargine (LANTUS;BASAGLAR) 100 UNIT/ML injection pen Inject 18 Units into the skin nightly  Qty: 5 pen, Refills: 0    Associated Diagnoses: Type 2 diabetes mellitus with diabetic neuropathy, with long-term current use of insulin ([facility])              Details   insulin aspart (NOVOLOG) 100 UNIT/ML injection vial Inject 5-6 Units into the skin daily      acetaminophen (TYLENOL) 325 mg tablet Take 650 mg by mouth every 6 hours as needed for Pain      chlorpheniramine (SB CHLORPHENIRAMINE) 4 MG tablet Take 4 mg by mouth every 6 hours as needed for Allergies      gabapentin (NEURONTIN) 300 MG capsule FILL 11/3 1 tab in am 1 tab afternoon 1 tab at dinner 3 tabs at bedtime  Qty: 180 capsule, Refills: 5    Associated Diagnoses: Type 2 diabetes mellitus with diabetic neuropathy, with long-term current use of insulin ([facility])      rosuvastatin (CRESTOR) 5 MG tablet TAKE ONE TABLET BY MOUTH ONCE NIGHTLY  Qty: 90 tablet, Refills: 1             Time Spent on discharge is more than 1 hour in the examination, evaluation, counseling and review of medications and discharge plan. Signed:    Nelly Espinoza MD   6/3/2022      Thank you HERIBERTO Bolden CNP for the opportunity to be involved in this patient's care. If you have any questions or concerns, please feel free to contact me at [phone].

## 2022-06-03 NOTE — PROGRESS NOTES
CLINICAL PHARMACY NOTE: MEDS TO BEDS    Total # of Prescriptions Filled: 4   The following medications were delivered to the patient:     START taking these medications     ferrous sulfate 324 (65 Fe) MG EC tablet   Take 1 tablet by mouth every other day for 28 doses  Qty: 30 tablet, Refills: 0    polyethylene glycol (GLYCOLAX) 17 g packet   Take 17 g by mouth daily as needed for Constipation  Qty: 527 g, Refills: 0    metroNIDAZOLE (FLAGYL) 500 MG tablet   Take 1 tablet by mouth 3 times daily for 10 days  Qty: 30 tablet, Refills: 0    levoFLOXacin (LEVAQUIN) 500 MG tablet   Take 1 tablet by mouth daily for 7 days  Qty: 7 tablet, Refills: 0      Additional Documentation:  Pharmacy intern counseled patient and family on new medication therapy.  Patient to get fosfomycin at [facility] due NOT IN STOCK AND  cost.

## 2022-06-03 NOTE — PLAN OF CARE
Patient to be discharged today, AVS reviewed at bedside with patient and sister, all questions answered. Walker delivered to room.

## 2022-06-03 NOTE — DISCHARGE INSTR - COC
Infection Status       Infection Onset Added Last Indicated Last Indicated By Review Planned Expiration Resolved Resolved By    MDRO (multi-drug resistant organism) 05/29/22 06/01/22 06/01/22 John Rodriguez, RN        Urine    ESBL (Extended Spectrum Beta Lactamase) 05/29/22 06/01/22 05/29/22 Culture, Urine                Nurse Assessment:  Last Vital Signs: BP (!) 109/54   Pulse 85   Temp 98.6 °F (37 °C) (Oral)   Resp 17   Ht 5' 1\" (1.549 m)   Wt 127 lb 13.9 oz (58 kg)   SpO2 91%   BMI 24.16 kg/m²     Last documented pain score (0-10 scale): Pain Level: 10  Last Weight:   Wt Readings from Last 1 Encounters:   06/03/22 127 lb 13.9 oz (58 kg)     Mental Status:  oriented, alert, coherent, logical, thought processes intact, and able to concentrate and follow conversation    IV Access:  - None    Nursing Mobility/ADLs:  Walking   Assisted  Transfer  Assisted  Bathing  Assisted  Dressing  Assisted  Toileting  Assisted  Feeding  [address]  Independent  Med Delivery   whole    Wound Care Documentation and Therapy:        Elimination:  Continence: Bowel: Yes  Bladder: Yes  Urinary Catheter: None   Colostomy/Ileostomy/Ileal Conduit: No       Date of Last BM: ***    Intake/Output Summary (Last 24 hours) at 6/3/2022 0630  Last data filed at 6/2/2022 1340  Gross per 24 hour   Intake 480 ml   Output --   Net 480 ml     I/O last 3 completed shifts: In: 4787.8 [P.O.:2217; I.V.:1183.6; IV Piggyback:1387.2]  Out: -     Safety Concerns: At Risk for Falls    Impairments/Disabilities:      None    Nutrition Therapy:  Current Nutrition Therapy:   - Oral Diet:  General    Routes of Feeding: Oral  Liquids: No Restrictions  Daily Fluid Restriction: no  Last Modified Barium Swallow with Video (Video Swallowing Test): not done    Treatments at the Time of Hospital Discharge:   Respiratory Treatments: ***  Oxygen Therapy:  is not on home oxygen therapy.   Ventilator:    - No ventilator support    Rehab Therapies:  Weight Bearing Status/Restrictions: No weight bearing restrictions  Other Medical Equipment (for information only, NOT a DME order):  walker  Other Treatments: ***    Patient's personal belongings (please select all that are sent with patient):  clothing    RN SIGNATURE:  Electronically signed by Terri Fitzgerald RN on 6/3/22 at 12:13 PM EDT    CASE MANAGEMENT/SOCIAL WORK SECTION    Inpatient Status Date: 05/29/2022    Readmission Risk Assessment Score:  Readmission Risk              Risk of Unplanned Readmission:  12             / signature: Electronically signed by Keiko Gambino RN on 6/3/22 at 12:46 PM EDT    PHYSICIAN SECTION    Prognosis: Good    Condition at Discharge: Stable    Rehab Potential (if transferring to Rehab): Good    Recommended Labs or Other Treatments After Discharge: PT, OT, wound care, follow-up with PCP in 1 week, follow-up with general surgery in 1 week, follow-up with gynecology in 1 week, follow-up with oncology in 1 week. Follow-up with endocrinology in 4 weeks    Physician Certification: I certify the above information and transfer of Lisa Cox  is necessary for the continuing treatment of the diagnosis listed and that she requires [address] for greater 30 days.      Update Admission H&P: No change in H&P    PHYSICIAN SIGNATURE:  Electronically signed by Fany Damon MD on 6/3/22 at 10:00 AM EDT

## 2022-06-03 NOTE — CARE COORDINATION
CASE MANAGEMENT DISCHARGE SUMMARY: Discharge order noted. Patient discharging to home with a FWW provided by [facility]. Refused [facility]. States she does not need it and lives in a one level home.      DISCHARGE DATE: 6/3/22    DISCHARGED TO HOME     TRANSPORTATION: [facility]             TIME: Afternoon     PREFERRED PHARMACY: [address] [place] [address] 93 BSN RN  Case Management  [phone]    Electronically signed by Addis Morrell RN on 6/3/2022 at 12:48 PM

## 2022-06-06 NOTE — TELEPHONE ENCOUNTER
[facility] Transitions Initial Follow Up Call    Outreach made within 2 business days of discharge: Yes    Patient: Gregory Baumann Patient : 1955   MRN: [id number]  Reason for Admission: There are no discharge diagnoses documented for the most recent discharge. Discharge Date: 6/3/22       Spoke with: PATIENT    Discharge department/facility: [facility]    TCM Interactive Patient Contact:  Was patient able to fill all prescriptions: Yes  Was patient instructed to bring all medications to the follow-up visit: Yes  Is patient taking all medications as directed in the discharge summary? Yes  Does patient understand their discharge instructions: Yes  Does patient have questions or concerns that need addressed prior to 7-14 day follow up office visit: no    Scheduled appointment with PCP within 7-14 days    Follow Up  Future Appointments   Date Time Provider Delia Schmitz   10/17/2022 10:00 AM  [facility], [facility] [address] SHE IS NOT READY TO SCHEDULE A FOLLOW UP YET BECAUSE SHE HAS SEVERAL UPCOMING APPTS WITH ONCOLOGY DUE TO A NEW MASS IN THE RIGHT VULVA/PERINEUM THAT NEEDS TO BE ADDRESSED. PT DOES NOT DRIVE AND NEEDS TO COORDINATE TRANSPORTATION AND CALL US BACK. I ASKED HER TO CALL ME BACK IF SHE NEEDS ANYTHING. PT AGREED. [address]    PT CALLED BACK, SHE IS STILL NOT ABLE TO SCHEDULE BUT WANTED OUR OFFICE TO KNOW WHY SHE HASN'T FOLLOWED UP YET.  SHE HAS A LOT OF APPTS SET UP WITH ONCOLOGY FOR FUTURE TREATMENT. [address]    Thanks  JANIYA Medina   Clinical Supervisor

## 2022-06-06 NOTE — CARE COORDINATION
[facility] Transitions Initial Follow Up Call    Call within 2 business days of discharge: Yes    Patient: Devin Tompkins Patient : 1955   MRN: [id number]  Reason for Admission: Severe sepsis  Discharge Date: 6/3/22 RARS: Readmission Risk Score: 13.4 ( )      Last Discharge [facility]       Complaint Diagnosis Description Type Department Provider    22 Extremity Weakness; Wound Check; Fatigue Severe sepsis ([facility] [address] . .. ED to Hosp-Admission (Discharged) (ADMITTED) Mikki Price MD; Ananda Cassidy ... Spoke with: no one    Facility: [address] [place] [address] Transitions 24 Hour Call    Care Transitions Interventions       Initial attempt at CT discharge phone call. Unable to reach patient. Left message. Contact info provided. Requested return call to CTN.     Follow Up  Future Appointments   Date Time Provider Delia Schmitz   2022  2:00 PM Donny Veronica MD [facility]   10/17/2022 10:00 AM Rea Steele, APRN - [facility] RN BSN  Care Transition Nurse  [phone]

## 2022-06-06 NOTE — PROGRESS NOTES
C-diff Questionnaire:     * Admitted with diarrhea? [] YES    [x]  NO     *Prior history of C-Diff. In last 3 months? [] YES    [x]  NO     *Antibiotic use in the past 6-8 weeks? []  NO    [x]  YES      If yes, which: REASON__septic_____     *Prior hospitalization or nursing home in the last month? [x]  YES    []  NO     SAFETY FIRST. .call before you fall    [address] time__1230_        Surgery time_1400    Do not eat or drink anything after 12:00 midnight prior to your surgery. This includes water chewing gum, mints and ice chips- the Day of Surgery. You may brush your teeth and gargle the morning of your surgery, but do not swallow the water     Please see your family doctor/pediatrician for a history and physical and/or questions concerning medications. Bring any test results/reports from your physicians office. If you are under the care of a heart doctor or specialist doctor, please be aware that you may be asked to them for clearance    You may be asked to stop blood thinners such as Coumadin, Plavix, Fragmin, Lovenox, etc., or any anti-inflammatories such as:  Aspirin, Ibuprofen, Advil, Naproxen prior to your surgery. We also ask that you stop any OTC medications such as fish oil, vitamin E, glucosamine, garlic, Multivitamins, COQ 10, etc.    We ask that you do not smoke 24 hours prior to surgery  We ask that you do not  drink any alcoholic beverages 24 hours prior to surgery     You must make arrangements for a responsible adult to take you home after your surgery. For your safety you will not be allowed to leave alone or drive yourself home. Your surgery will be cancelled if you do not have a ride home. Also for your safety, it is strongly suggested that someone stay with you the first 24 hours after your surgery.      A parent or legal guardian must accompany a child scheduled for surgery and plan to stay at the hospital until the child is discharged. Please do not bring other children with you. For your comfort, please wear simple loose fitting clothing to the hospital.  Please do not bring valuables. Do not wear any make-up or nail polish on your fingers or toes. For your safety, please do not wear any jewelry or body piercing's on the day of surgery. All jewelry must be removed. If you have dentures, they will be removed before going to operating room. For your convenience, we will provide you with a container. If you wear contact lenses or glasses, they will be removed, please bring a case for them. If you have a living will and a durable power of  for healthcare, please bring in a copy. As part of our patient safety program to minimize surgical site infections, we ask you to do the following:    · Please notify your surgeon if you develop any illness between         now and the day of your surgery. · This includes a cough, cold, fever, sore throat, nausea,         or vomiting, and diarrhea, etc.  ·  Please notify your surgeon if you experience dizziness, shortness         of breath or blurred vision between now and the time of your surgery. Do not shave your operative site 96 hours prior to surgery. For face and neck surgery, men may use an electric razor 48 hours   prior to surgery. You may shower the night before surgery or the morning of   your surgery with an antibacterial soap. You will need to bring a photo ID and insurance card     If you use a C-pap or Bi-pap machine, please bring your machine with you to the hospital     Our goal is to provide you with excellent care, therefore, visitors will be limited to so that we may focus on providing this care for you. Please contact your surgeon office, if you have any further questions.                  [facility] phone number:  [phone] PAT fax number:  [phone]    Please note these are generalized instructions for all surgical cases, you may be provided with more specific instructions according to your surgery.

## 2022-06-07 NOTE — CARE COORDINATION
[facility] Transitions Initial Follow Up Call    Call within 2 business days of discharge: Yes    Patient: Marty Heredia Patient : 1955   MRN: [id number]  Reason for Admission: Severe sepsis  Discharge Date: 6/3/22 RARS: Readmission Risk Score: 13.4 ( )      Last Discharge [facility]       Complaint Diagnosis Description Type Department Provider    22 Extremity Weakness; Wound Check; Fatigue Severe sepsis ([facility] [address] . .. ED to Hosp-Admission (Discharged) (ADMITTED) Moisés Cai MD; Aguilar Arana ... Spoke with: Marty Heredia - patient    Facility: [address] [place] [address] Transitions 24 Hour Call    Care Transitions Interventions       2nd attempt at CT discharge phone call. Spoke briefly with Vielka Diaz. Introduced self and role of CTN. Vielka Diaz declined completing discharge phone call and declined additional CTN outreach.     Follow Up  Future Appointments   Date Time Provider Delia Schmitz   2022  2:00 PM Tram Crooks MD [facility]   10/17/2022 10:00 AM Rea Thorpe, APRN - [facility] RN BSN  Care Transition Nurse  [phone]

## 2022-06-08 NOTE — ANESTHESIA PRE PROCEDURE
[facility] Department of Anesthesiology  Pre-Anesthesia Evaluation/Consultation       Name:  Love Toussaint  : 1955  Age:  77 y.o. MRN:  [id number]  Date: 2022           Surgeon: Surgeon(s):  Ganesh Siddiqui MD    Procedure: Procedure(s):  PORT A CATHETER INSERTION (NON ACCESSED)     No Known Allergies  Patient Active Problem List   Diagnosis    DMII (diabetes mellitus, type 2) ([address]    Duodenal ulcer    Pain in both lower extremities    Varicosities of leg    Elevated blood pressure reading    Mixed hyperlipidemia    Type 2 diabetes mellitus with diabetic neuropathy    Acute cystitis with hematuria    Severe sepsis (HCC)    Septic shock (HCC)    Tachycardia    Neutrophilic leukocytosis    Elevated lactic acid level    Elevated liver function tests    Generalized weakness    Fatigue    Vulvar mass    Enlarged, heterogeneous thyroid    Severe malnutrition (HCC)     Past Medical History:   Diagnosis Date    Cancer ([address]     vulva    Congenital prolapsed rectum     Displaced bladder     Enlarged, heterogeneous thyroid 2022    General weakness     Hernia     Mixed hyperlipidemia 10/14/2019    Septic shock ([address]     2022    Severe malnutrition ([address] 2022    Type 2 diabetes mellitus without complication ([address]     Varicose vein of leg     Vulvar mass      History reviewed. No pertinent surgical history. Social History     Tobacco Use    Smoking status: Never Smoker    Smokeless tobacco: Never Used   Vaping Use    Vaping Use: Never used   Substance Use Topics    Alcohol use: No     Alcohol/week: 0.0 standard drinks    Drug use: No     Medications  No current facility-administered medications on file prior to encounter.      Current Outpatient Medications on File Prior to Encounter   Medication Sig Dispense Refill    ferrous sulfate (IRON 325) 325 (65 Fe) MG tablet Take 325 mg by mouth every other day Max: 97.5 °F (36.4 °C)   Max taken time: 22 1200  Pulse  Av  Min: 87   Min taken time: 22 1200  Max: 80   Max taken time: 22 1200  Resp  Av  Min: 16   Min taken time: 22 1200  Max: 16   Max taken time: 22 1200  BP  Min: 121/57   Min taken time: 22 1200  Max: 121/57   Max taken time: 22 1200  SpO2  Av %  Min: 98 %   Min taken time: 22 1200  Max: 98 %   Max taken time: 22 1200    BP Readings from Last 3 Encounters:   22 (!) 121/57   22 (!) 108/54   22 110/60     BMI  Body mass index is 23.81 kg/m². Estimated body mass index is 23.81 kg/m² as calculated from the following:    Height as of this encounter: 5' 1\" (1.549 m). Weight as of this encounter: 126 lb (57.2 kg). CBC   Lab Results   Component Value Date    WBC 20.2 2022    RBC 3.29 2022    HGB 8.4 2022    HCT 26.6 2022    MCV 81.1 2022    RDW 16.8 2022     2022     CMP    Lab Results   Component Value Date     2022    K 3.8 2022     2022    CO2 25 2022    BUN 9 2022    CREATININE <0.5 2022    GFRAA >60 2022    AGRATIO 0.6 2022    LABGLOM >60 2022    GLUCOSE 140 2022    PROT 5.0 2022    CALCIUM 8.1 2022    BILITOT <0.2 2022    ALKPHOS 77 2022    AST 14 2022    ALT 23 2022     BMP    Lab Results   Component Value Date     2022    K 3.8 2022     2022    CO2 25 2022    BUN 9 2022    CREATININE <0.5 2022    CALCIUM 8.1 2022    GFRAA >60 2022    LABGLOM >60 2022    GLUCOSE 140 2022     POCGlucose  No results for input(s): GLUCOSE in the last 72 hours.    Coags  No results found for: PROTIME, INR, APTT  HCG (If Applicable) No results found for: PREGTESTUR, PREGSERUM, HCG, HCGQUANT   ABGs No results found for: PHART, PO2ART, UWO7UHC, JVC9CNX, BEART, A5AOYZUZ   Type & Screen (If Applicable)  No results found for: LABABO, LABRH                         BMI: Wt Readings from Last 3 Encounters:       NPO Status:   Date of last liquid consumption: 06/07/22   Time of last liquid consumption: 2200   Date of last solid food consumption: 06/07/22      Time of last solid consumption: 1800       Anesthesia Evaluation  Patient summary reviewed no history of anesthetic complications:   Airway: Mallampati: III  TM distance: >3 FB   Neck ROM: full  Mouth opening: > = 3 FB   Dental:    (+) partials      Pulmonary:Negative Pulmonary ROS and normal exam                               Cardiovascular:  Exercise tolerance: good (>4 METS),         ECG reviewed  Rhythm: regular  Rate: normal           Beta Blocker:  Not on Beta Blocker         Neuro/Psych:   (+) neuromuscular disease:,             GI/Hepatic/Renal:   (+) GERD: well controlled, PUD,           Endo/Other:    (+) DiabetesType II DM, using insulin, . Abdominal:             Vascular: negative vascular ROS. Other Findings:           Anesthesia Plan      MAC     ASA 3       Induction: intravenous. MIPS: Postoperative opioids intended and Prophylactic antiemetics administered. Anesthetic plan and risks discussed with patient and child/children. Plan discussed with CRNA. This pre-anesthesia assessment may be used as a history and physical.    DOS STAFF ADDENDUM:    Pt seen and examined, chart reviewed (including anesthesia, drug and allergy history). No interval changes to history and physical examination. Anesthetic plan, risks, benefits, alternatives, and personnel involved discussed with patient. Questions and concerns addressed. Patient(family) verbalized an understanding and agrees to proceed.       Lb Katz MD  June 8, 2022  12:40 PM

## 2022-06-08 NOTE — PROGRESS NOTES
Wound care came to bedside. They stated the patient should used benita pads and mesh panties for wound care.

## 2022-06-08 NOTE — CARE COORDINATION
[facility] Wound Ostomy Continence Nurse  Consult Note       NAME:  Ovi Adame  MEDICAL RECORD NUMBER:  [id number]  AGE: 77 y.o. GENDER: female  : 1955  TODAY'S DATE:  2022    Subjective   Reason for WOCN Evaluation and Assessment: asked to see pt for vulvar mass      Ovi Adame is a 77 y.o. female referred by:   [x] Physician  [x] Nursing  [] Other:     Wound Identification:  Wound Type: malignant wound  Contributing Factors: vulvar cancer    Wound History: pt has had mass for awhile  Current Wound Care Treatment:  peripads    Patient Goal of Care:  [] Wound Healing  [] Odor Control  [x] Palliative Care  [] Pain Control   [] Other:         PAST MEDICAL HISTORY        Diagnosis Date    Cancer ([address]     vulva    Congenital prolapsed rectum     Displaced bladder     Enlarged, heterogeneous thyroid 2022    General weakness     Hernia     Mixed hyperlipidemia 10/14/2019    Septic shock ([address]     2022    Severe malnutrition ([address] 2022    Type 2 diabetes mellitus without complication ([address] 9152    Varicose vein of leg     Vulvar mass        PAST SURGICAL HISTORY    History reviewed. No pertinent surgical history.     FAMILY HISTORY    Family History   Problem Relation Age of Onset    High Blood Pressure Mother     Hypertension Mother     High Cholesterol Mother     Macular Degen Mother     Thyroid Disease Mother     Cancer Father     Alcohol Abuse Father     High Blood Pressure Sister     High Cholesterol Sister     Thyroid Disease Sister     Heart Disease Brother     Alcohol Abuse Brother        SOCIAL HISTORY    Social History     Tobacco Use    Smoking status: Never Smoker    Smokeless tobacco: Never Used   Vaping Use    Vaping Use: Never used   Substance Use Topics    Alcohol use: No     Alcohol/week: 0.0 standard drinks    Drug use: No       ALLERGIES    No Known Allergies    MEDICATIONS    No current facility-administered medications on file prior to encounter.      Current Outpatient Medications on File Prior to Encounter   Medication Sig Dispense Refill    ferrous sulfate (IRON 325) 325 (65 Fe) MG tablet Take 325 mg by mouth every other day      insulin glargine (LANTUS;BASAGLAR) 100 UNIT/ML injection pen Inject 18 Units into the skin nightly (Patient taking differently: Inject 18 Units into the skin 2 times daily ) 5 pen 0    polyethylene glycol (GLYCOLAX) 17 g packet Take 17 g by mouth daily as needed for Constipation 527 g 0    metroNIDAZOLE (FLAGYL) 500 MG tablet Take 1 tablet by mouth 3 times daily for 10 days 30 tablet 0    levoFLOXacin (LEVAQUIN) 500 MG tablet Take 1 tablet by mouth daily for 7 days 7 tablet 0    insulin aspart (NOVOLOG) 100 UNIT/ML injection vial Inject 2 Units into the skin as needed Sliding scale on Blood sugars over 139      acetaminophen (TYLENOL) 325 mg tablet Take 650 mg by mouth every 6 hours as needed for Pain      chlorpheniramine (SB CHLORPHENIRAMINE) 4 MG tablet Take 4 mg by mouth every 6 hours as needed for Allergies      gabapentin (NEURONTIN) 300 MG capsule FILL 11/3 1 tab in am 1 tab afternoon 1 tab at dinner 3 tabs at bedtime 180 capsule 5    rosuvastatin (CRESTOR) 5 MG tablet TAKE ONE TABLET BY MOUTH ONCE NIGHTLY 90 tablet 1       Objective    /78   Pulse 87   Temp 97.7 °F (36.5 °C) (Temporal)   Resp 26   Ht 5' 1\" (1.549 m)   Wt 126 lb (57.2 kg)   SpO2 99%   BMI 23.81 kg/m²     LABS:  WBC:    Lab Results   Component Value Date    WBC 20.2 06/03/2022     H/H:    Lab Results   Component Value Date    HGB 8.4 06/03/2022    HCT 26.6 06/03/2022     PTT:  No results found for: APTT, PTT[APTT}  PT/INR:  No results found for: PROTIME, INR  HgBA1c:    Lab Results   Component Value Date    LABA1C 6.0 04/18/2022       Assessment   Eric Risk Score: Eric Scale Score: 18    Patient Active Problem List   Diagnosis Code    DMII (diabetes mellitus, type 2) (HCC) E11.9    Duodenal ulcer K26.9    Pain in both lower extremities M79.604, M79.605    Varicosities of leg I83.90    Elevated blood pressure reading R03.0    Mixed hyperlipidemia E78.2    Type 2 diabetes mellitus with diabetic neuropathy E11.40    Acute cystitis with hematuria N30.01    Severe sepsis (HCC) A41.9, R65.20    Septic shock (HCC) A41.9, R65.21    Tachycardia Q05.0    Neutrophilic leukocytosis G01.0    Elevated lactic acid level R79.89    Elevated liver function tests R79.89    Generalized weakness R53.1    Fatigue R53.83    Vulvar mass N90.89    Enlarged, heterogeneous thyroid E04.9    Severe malnutrition ([address] E43          Pt seen in PACU. Has had port placed for chemo. She tells me that she takes care of mass to vulvar area by cleaning with soap and water, taking a shower if needed, and changing dressings. Tells me that it always bleeds. Pt has necrotic fungating mass to vulvar area. Has small amount of bleeding. Has been marked for radiation therapy. Starts next week. Has been seen by gyn onc, hem onc last admit. Plans for chemo as well. Response to treatment:  Well tolerated by patient. Plan   Plan of Care:    -cleanse area with water  -peripad and mesh panties for now. OK to resume home treatment when home.      Specialty Bed Required : N/A   [] Low Air Loss   [] Pressure Redistribution  [] Fluid Immersion  [] Bariatric  [] Total Pressure Relief  [] Other:     Current Diet: No diet orders on file  Dietician consult:  N/A    Discharge Plan:  Placement for patient upon discharge: home with support    Patient appropriate for Outpatient [address]: No    Referrals:  []   [] [address] [facility]  [] Supplies  [] Other    Patient/Caregiver Teaching:  Level of patient/caregiver understanding able to:   [] Indicates understanding       [] Needs reinforcement  [] Unsuccessful      [x] Verbal Understanding  [] Demonstrated understanding       [] No evidence of learning  [] Refused teaching         [] N/A Electronically signed by Becca Walls on 6/8/2022 at 3:37 PM

## 2022-06-08 NOTE — PROGRESS NOTES
To pacu from OR. Pt awake, denies pain. Dressing to left chest dry and intact. IV infusing. Monitor in sinus rhythm.

## 2022-06-08 NOTE — BRIEF OP NOTE
Brief Postoperative Note      Patient: Diogenes Osullivan  YOB: 1955  MRN: [id number]    Date of Procedure: 6/8/2022    Pre-Op Diagnosis: VULVAR CANCER    Post-Op Diagnosis: Same       Procedure(s):  PORT A CATHETER INSERTION (NON ACCESSED) left subclavian vein with fluroscopy    Surgeon(s):  José Luis Muhammad MD    Assistant:  Surgical Assistant: Rfuus Apodaca    Anesthesia: Monitor Anesthesia Care    Estimated Blood Loss (mL): less than 50     Complications: None    Specimens:   * No specimens in log *    Implants:  Implant Name Type Inv.  Item Serial No.  Lot No. LRB No. Used Action   PORT INFUS SGL LUMN ATTCH POLYUR OPN END CATH 8FR POWERPRT - [id number]  PORT INFUS SGL LUMN ATTCH POLYUR OPN END CATH 8FR POWERPRT  Grabbed-WD [id number] 1 Implanted         Drains: * No LDAs found *    Findings: no complications    Electronically signed by Arnel Clifton MD on 6/8/2022 at 1:49 PM

## 2022-06-08 NOTE — H&P
Preoperative History and Physical Update    H&P from 5/29/2022 was reviewed    No changes noted today    PE  Alert and oriented  No chest wall abnormalities    A/P  Vulvar cancer  For port a cath placement today    The risks, benefits and alternatives to the planned procedure were discussed. Patient expressed an understanding and is willing to proceed.     Electronically signed by Daniel Gardner MD on 6/8/2022 at 1:06 PM

## 2022-06-08 NOTE — ANESTHESIA POSTPROCEDURE EVALUATION
[facility] Department of Anesthesiology  Post-Anesthesia Note       Name:  Julia Salazar                                  Age:  77 y.o. MRN:  [id number]     Last Vitals & Oxygen Saturation: /70   Pulse 80   Temp 98 °F (36.7 °C) (Temporal)   Resp 18   Ht 5' 1\" (1.549 m)   Wt 126 lb (57.2 kg)   SpO2 98%   BMI 23.81 kg/m²   Patient Vitals for the past 4 hrs:   BP Temp Temp src Pulse Resp SpO2   06/08/22 1617 122/70 98 °F (36.7 °C) Temporal 80 18 98 %   06/08/22 1515 123/78 -- -- 87 26 99 %   06/08/22 1500 116/70 -- -- -- 14 99 %   06/08/22 1445 106/79 -- -- 86 20 100 %   06/08/22 1430 123/63 -- -- 88 19 100 %   06/08/22 1415 (!) 109/56 -- -- 77 21 97 %   06/08/22 1410 (!) 105/53 -- -- 78 17 100 %   06/08/22 1405 (!) 99/54 -- -- 77 16 99 %   06/08/22 1404 -- -- -- 78 -- --   06/08/22 1400 (!) 101/57 -- -- 78 21 98 %   06/08/22 1357 (!) 99/54 97.7 °F (36.5 °C) Temporal 77 16 98 %       Level of consciousness:  Awake, alert    Respiratory: Respirations easy, no distress. Stable. Cardiovascular: Hemodynamically stable. Hydration: Adequate. PONV: Adequately managed. Post-op pain: Adequately controlled. Post-op assessment: Tolerated anesthetic well without complication. Complications:  None.     Lilly Apodaca MD  June 8, 2022   4:34 PM

## 2022-06-09 NOTE — OP NOTE
[address] [place] [address]                                OPERATIVE REPORT    PATIENT NAME: Yon Dodson                    :        1955  MED REC NO:   [id number]                          ROOM:  ACCOUNT NO:   [de-identified]                           ADMIT DATE: 2022  PROVIDER:     Galdino Babcock MD      DATE OF PROCEDURE:  2022    PREOPERATIVE DIAGNOSIS:  Cancer of the vulva. POSTOPERATIVE DIAGNOSIS:  Cancer of the vulva. PROCEDURE PERFORMED:  Left subclavian Port-A-Cath placement with  fluoroscopic guidance. SURGEON:  Galdino Babcock MD    SPECIMEN:  None. ESTIMATED BLOOD LOSS:  Less than 50 mL. COMPLICATIONS:  None. DISPOSITION:  To recovery in stable condition. INDICATION:  The patient is a 51-year-old female planning to undergo  treatment for cancer of the vulva. This involved chemo and radiation  and a Port-A-Cath was recommended. The risks, benefits, and  alternatives were reviewed and she agreed to proceed. PROCEDURE:  The patient was brought to the operating room, placed  supine, sedation delivered and chest and neck prepped and draped in a  sterile fashion. She was repositioned into Trendelenburg and local  anesthetic infused near the left clavicle. Using an 18-gauge needle,  the left subclavian vein was cannulated. Dark non-pulsatile blood was  noted, so a guidewire was passed under fluoroscopy into the vena cava. The needle was removed and a transverse incision made on the left chest  wall to encompass the guidewire. Dissection was carried down to the  fascia with electrocautery and then in the caudal direction using  cautery and blunt dissection until a subcutaneous pocket was created. Sheath and dilator were now passed over the guidewire under fluoroscopy.   The guidewire and dilator were removed and an 8-Pashto catheter passed  down the sheath and positioned in the distal superior vena cava. The  sheath was then removed, the catheter transected and attached to the  port. The port was cannulated, aspirated and flushed easily. The port  was now placed into the subcutaneous pocket and anchored to the  pectoralis fascia with interrupted 3-0 Vicryl sutures. The skin was  closed with 3-0 and 4-0 Vicryl. The port was again accessed and a  concentrated heparin infusion placed. Dressings were then applied and  the patient transferred to recovery in good condition.         Zara Christianson MD    D: 06/08/2022 15:50:01       T: 06/08/2022 15:53:46     [email or web address]  Job#: [id number]     Doc#: [id number]    CC:

## 2022-08-01 PROBLEM — E16.2 HYPOGLYCEMIA: Status: ACTIVE | Noted: 2022-01-01

## 2022-08-01 PROBLEM — E87.8 ELECTROLYTE ABNORMALITY: Status: ACTIVE | Noted: 2022-01-01

## 2022-08-01 NOTE — ED PROVIDER NOTES
[address] [facility]      Pt Name: Deacon Hollis  MRN: [id number]  [place] 1955  Date of evaluation: 8/1/2022  Provider: Orquidea Gardner MD    CHIEF COMPLAINT     Low blood sugar    HISTORY OF PRESENT ILLNESS    Deacon Hollis is a 79 y.o. female who presents to the emergency department with hyperglycemia. Patient presents with low sugar from home. Supposedly her sugar was low when she was giving glucagon by EMS. She feels better. States she just felt weak and tired and was on the ground for long period of time. Denies injuring herself. No chest pain or shortness of breath. No other associated symptoms. Nursing Notes were reviewed. Including nursing noted for FM, Surgical History, Past Medical History, Social History, vitals, and allergies; agree with all. REVIEW OF SYSTEMS       Review of Systems    Except as noted above the remainder of the review of systems was reviewed and negative.      PAST MEDICAL HISTORY     Past Medical History:   Diagnosis Date    Congenital prolapsed rectum     Displaced bladder     Enlarged, heterogeneous thyroid 05/29/2022    General weakness     Hernia     Mixed hyperlipidemia 10/14/2019    Septic shock ([facility] [address]     5/2022    Severe malnutrition ([facility] [address] 05/31/2022    Type 2 diabetes mellitus without complication ([facility] [address] 18/03/0468    Varicose vein of leg     vulvar cancer     vulva    Vulvar mass        SURGICAL HISTORY       Past Surgical History:   Procedure Laterality Date    PORT SURGERY N/A 6/8/2022    PORT A CATHETER INSERTION (NON ACCESSED) performed by Aurelia Ledesma MD at [address]       Previous Medications    ACETAMINOPHEN (TYLENOL) 325 MG TABLET    Take 650 mg by mouth every 6 hours as needed for Pain    CHLORPHENIRAMINE (SB CHLORPHENIRAMINE) 4 MG TABLET    Take 4 mg by mouth every 6 hours as needed for Allergies    FERROUS SULFATE (IRON 325) 325 (65 FE) MG TABLET Take 325 mg by mouth every other day    GABAPENTIN (NEURONTIN) 300 MG CAPSULE    FILL 11/3 1 tab in am 1 tab afternoon 1 tab at dinner 3 tabs at bedtime    INSULIN ASPART (NOVOLOG) 100 UNIT/ML INJECTION VIAL    Inject 2 Units into the skin as needed Sliding scale on Blood sugars over 139    INSULIN GLARGINE (LANTUS;BASAGLAR) 100 UNIT/ML INJECTION PEN    Inject 18 Units into the skin nightly    ROSUVASTATIN (CRESTOR) 5 MG TABLET    TAKE ONE TABLET BY MOUTH ONCE NIGHTLY       ALLERGIES     Patient has no known allergies. FAMILY HISTORY        Family History   Problem Relation Age of Onset    High Blood Pressure Mother     Hypertension Mother     High Cholesterol Mother     Macular Degen Mother     Thyroid Disease Mother     Cancer Father     Alcohol Abuse Father     High Blood Pressure Sister     High Cholesterol Sister     Thyroid Disease Sister     Heart Disease Brother     Alcohol Abuse Brother        SOCIAL HISTORY       Social History     Socioeconomic History    Marital status:    Tobacco Use    Smoking status: Never    Smokeless tobacco: Never   Vaping Use    Vaping Use: Never used   Substance and Sexual Activity    Alcohol use: No     Alcohol/week: 0.0 standard drinks    Drug use: No    Sexual activity: Yes     Partners: Male       PHYSICAL EXAM       ED Triage Vitals [08/01/22 0251]   BP Temp Temp Source Heart Rate Resp SpO2 Height Weight   119/62 (!) 95.4 °F (35.2 °C) Axillary 73 20 100 % 5' 1\" (1.549 m) 145 lb 8 oz (66 kg)       Physical Exam  Vitals and nursing note reviewed. Constitutional:       General: She is not in acute distress. Appearance: She is well-developed. She is toxic-appearing. She is not diaphoretic. HENT:      Head: Normocephalic and atraumatic. Right Ear: External ear normal.      Left Ear: External ear normal.   Eyes:      General:         Right eye: No discharge. Left eye: No discharge.       Conjunctiva/sclera: Conjunctivae normal.      Pupils: Pupils are equal, round, and reactive to light. Cardiovascular:      Rate and Rhythm: Normal rate and regular rhythm. Heart sounds: No murmur heard. Pulmonary:      Effort: Pulmonary effort is normal. No respiratory distress. Breath sounds: Normal breath sounds. No wheezing or rales. Abdominal:      General: Bowel sounds are normal. There is no distension. Palpations: Abdomen is soft. There is no mass. Tenderness: There is no abdominal tenderness. There is no guarding or rebound. Genitourinary:     Comments: Deferred  Musculoskeletal:         General: No deformity. Normal range of motion. Cervical back: Normal range of motion and neck supple. Skin:     General: Skin is warm. Findings: No erythema or rash. Neurological:      Mental Status: She is alert and oriented to person, place, and time. She is not disoriented. Cranial Nerves: No cranial nerve deficit. Motor: No atrophy or abnormal muscle tone. Coordination: Coordination normal.   Psychiatric:         Behavior: Behavior normal.         Thought Content:  Thought content normal.       DIAGNOSTIC RESULTS     RADIOLOGY:   Non-plain film images such as CT, Ultrasoundand MRI are read by the radiologist. Plain radiographic images are visualized and preliminarily interpreted by the emergency physician with the below findings:    Imaging reassuring    ED BEDSIDE ULTRASOUND:   Performed by ED Physician - none    LABS:  Labs Reviewed   CBC WITH AUTO DIFFERENTIAL - Abnormal; Notable for the following components:       Result Value    RBC 2.85 (*)     Hemoglobin 8.2 (*)     Hematocrit 25.3 (*)     RDW 28.0 (*)     Platelets 383 (*)     Neutrophils Absolute 7.8 (*)     Lymphocytes Absolute 0.2 (*)     All other components within normal limits   BASIC METABOLIC PANEL W/ REFLEX TO MG FOR LOW K - Abnormal; Notable for the following components:    Potassium reflex Magnesium 2.6 (*)     Chloride 98 (*)     Glucose 46 (*)     Calcium 6.5 (*)     All other components within normal limits    Narrative:     Jose E Tirado tel. [phone],  Chemistry results called to and read back by Marcel Quinn, 08/01/2022 04:18,  by Kylie Sewell results called to and read back by Marcel Quinn, 08/01/2022 04:18,  by [facility]   TROPONIN - Abnormal; Notable for the following components:    Troponin 0.02 (*)     All other components within normal limits   MAGNESIUM - Abnormal; Notable for the following components:    Magnesium 1.10 (*)     All other components within normal limits    Narrative:     Jose E Rowleykristina tel. [phone],  Chemistry results called to and read back by Marcel Quinn, 08/01/2022 04:18,  by [facility]  Chemistry results called to and read back by Marcel Quinn, 08/01/2022 04:18,  by [address] [place] [address] - Abnormal; Notable for the following components: Total Protein 5.6 (*)     Albumin 2.8 (*)     All other components within normal limits   POCT GLUCOSE - Abnormal; Notable for the following components:    POC Glucose 38 (*)     All other components within normal limits   CK    Narrative:     Jose E Tirado tel. [phone],  Chemistry results called to and read back by Marcel Quinn, 08/01/2022 04:18,  by [facility]  Chemistry results called to and read back by Marcel Quinn, 08/01/2022 04:18,  by Masha Bey TO CULTURE   TROPONIN   CK   TSH WITH REFLEX TO FT4   POCT GLUCOSE       All other labs were withinnormal range or not returned as of this dictation. EMERGENCY DEPARTMENT COURSE and DIFFERENTIAL DIAGNOSIS/MDM:     PMH, Surgical Hx, FH, Social Hx reviewed by myself (ETOH usage, Tobacco usage, Drug usage reviewed by myself, no pertinent Hx)- No Pertinent Hx     Old records were reviewed by me     42-year-old with hypokalemia, hypothermia, hypoglycemia. Electrolytes being repleted. D10 drip. Bear hugger. Patient be admitted for further inpatient valuation.     CRITICAL CARE TIME   Total Critical Caretime was 42 minutes, excluding separately reportable procedures. There was a high probability of clinically significant/life threatening deterioration in the patient's condition which required my urgent intervention. PROCEDURES:  Unlessotherwise noted below, none    FINAL IMPRESSION      1. Hypoglycemia    2. Hypothermia, initial encounter          DISPOSITION/PLAN   DISPOSITION Decision To Admit 08/01/2022 04:55:43 AM    PATIENT REFERRED TO:  No follow-up provider specified.     DISCHARGE MEDICATIONS:  New Prescriptions    No medications on file          (Please note that portions ofthis note were completed with a voice recognition program.  Efforts were made to edit the dictations but occasionally words are mis-transcribed.)    Kimo Kurtz MD(electronically signed)  Attending Emergency Physician         Kimo Kurtz MD  08/01/22 3086

## 2022-08-01 NOTE — PROGRESS NOTES
Medication Reconciliation    List of medications patient is currently taking is complete. Source of information: 1. Conversation with patient at bedside                                      2. EPIC records      Allergies  Patient has no known allergies.      Jenean Opitz, [facility], PharmD, BCPS  8/1/2022 1:21 PM

## 2022-08-01 NOTE — CONSULTS
[address] [place] [address]                                  CONSULTATION    PATIENT NAME: Rachana Grewal                    :        1955  MED REC NO:   [id number]                          ROOM:       034  ACCOUNT NO:   [de-identified]                           ADMIT DATE: 2022  PROVIDER:     Clyde Gomez MD    ONCOLOGY CONSULTATION    CONSULT DATE:  2022    REASON FOR CONSULTATION:  History of cervical cancer, in with syncope. CONSULTING PROVIDER:  Edsion Serrato DO    HISTORY OF PRESENT ILLNESS:  The patient is a pleasant 26-year-old  female with history of locally advanced vulvar cancer currently  receiving weekly cisplatin with definitive radiation therapy, who  presented to the hospital after having a syncopal episode. She  collapsed in front of her  who caught her. She was unconscious  just for a brief period of time. She had no seizure activity. Her  blood sugar was quite low. Upon arrival to the emergency room, she was  also found to be hypokalemic with a potassium of 2.6. She has been  dehydrated lately. She has been taking her same dose of Lantus despite  not eating very much. PAST MEDICAL HISTORY:  1.  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 It measured 14.1 cm. She also had enlarged bilateral inguinal  lymph nodes. She was then started on weekly cisplatin and definitive  radiation therapy. She has three radiations left. 2.  Diabetes. 3.  Hyperlipidemia. 4.  Hypertension. MEDICATION LIST:  Please see the list in her chart. SOCIAL HISTORY:  She is . She does not drink. She is retired. She is a former heavy smoker. FAMILY HISTORY:  Noncontributory.     REVIEW OF SYSTEMS:  She denies any recent fever, chills, sweats,  shortness of breath, chest pain,

## 2022-08-01 NOTE — PROGRESS NOTES
Noted== stage 3 on both right and left buttocks  .   Stage 3  on both right and left upper /gluteal thigh involving benita area   excoriated groin and benita area    wound care consult ordered prior to admit to floor   assessed with Rn breanna salomon  /  normal sinus confirmed with cmu

## 2022-08-01 NOTE — FLOWSHEET NOTE
Found on floor in bathroom , was down for about 1 hr bs was 28, ems gave d 50 bs 352, pt does not remember what happened.  Per pt no injury from fall , just feeling bad and not eating well

## 2022-08-01 NOTE — ED NOTES
Report called to [facility] RN. Bean Alicia RN  08/01/22 1441   Cyndi notified of critical potassium and calcium.       Bean Alicia RN  08/01/22 6691

## 2022-08-01 NOTE — ED NOTES
Patient unable to tolerate pur-wic due to pain and edema to genitals.       Kwasi Lao RN  08/01/22 5199

## 2022-08-01 NOTE — H&P
Hospital Medicine History & Physical      PCP: Jammie Sultana, APRN - CNP    Date of Admission: 8/1/2022    Date of Service: Pt seen/examined on 8/1/2022 and admitted to inpatient    Chief Complaint: Syncope, generalized weakness, fatigue, lightheadedness, nausea, decreased appetite      History Of Present Illness: The patient is a 79 y.o. 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 brought via EMS after being found in her bathroom unresponsive for an uncertain amount of time but was likely at least an hour. She was found by her  who is not at bedside but patient sister is at bedside who knows all the details. Patient apparently finished last round of chemotherapy this past Thursday and then also got radiation therapy on Friday and although she has generalized fatigue at baseline due to her treatments she did feel worse this past Friday going into the weekend from the most recent treatments. Her appetite has been diminished for a long time but over the last few days it has been increasingly worse. She was able to take all of her medications and notes that she was taking her Lantus for hyperglycemia however she has let her blood sugars get low into the 50s at times in the morning but was still attempting to keep her sugar elevated by taking in enough oral intake but overall her appetite is diminished. She was increasingly fatigued over the course of the last day and that she was apparently from the bathroom unresponsive on the floor. She does not remember the event itself but does remember going to the bathroom.   Apart from the symptoms of fatigue and generalized weakness and syncope, she denies other recent symptoms of fever, chills, dysuria, blood in urine/stool/sputum, vomiting/diarrhea/abdominal pain, chest pain, shortness of breath. Past Medical History:        Diagnosis Date    Congenital prolapsed rectum     Displaced bladder     Enlarged, heterogeneous thyroid 05/29/2022    General weakness     Hernia     Mixed hyperlipidemia 10/14/2019    Septic shock ([facility] [address]     5/2022    Severe malnutrition ([facility] [address] 05/31/2022    Type 2 diabetes mellitus without complication ([address] 85/66/4814    Varicose vein of leg     vulvar cancer     vulva    Vulvar mass        Past Surgical History:        Procedure Laterality Date    PORT SURGERY N/A 6/8/2022    PORT A CATHETER INSERTION (NON ACCESSED) performed by Bonifacio Carson MD at [place] [address]       Medications Prior to Admission:    Prior to Admission medications    Medication Sig Start Date End Date Taking? Authorizing Provider   rosuvastatin (CRESTOR) 5 MG tablet TAKE ONE TABLET BY MOUTH ONCE NIGHTLY 7/5/22   HERIBERTO Han CNP   ferrous sulfate (IRON 325) 325 (65 Fe) MG tablet Take 325 mg by mouth every other day    Historical Provider, MD   insulin glargine (LANTUS;BASAGLAR) 100 UNIT/ML injection pen Inject 18 Units into the skin nightly  Patient taking differently: Inject 18 Units into the skin 2 times daily  6/3/22   Santosh Barth MD   insulin aspart (NOVOLOG) 100 UNIT/ML injection vial Inject 2 Units into the skin as needed Sliding scale on Blood sugars over 139    Historical Provider, MD   acetaminophen (TYLENOL) 325 mg tablet Take 650 mg by mouth every 6 hours as needed for Pain    Historical Provider, MD   chlorpheniramine (SB CHLORPHENIRAMINE) 4 MG tablet Take 4 mg by mouth every 6 hours as needed for Allergies    Historical Provider, MD   gabapentin (NEURONTIN) 300 MG capsule FILL 11/3 1 tab in am 1 tab afternoon 1 tab at dinner 3 tabs at bedtime 4/18/22 10/1/22  HERIBERTO Han CNP       Allergies:  Patient has no known allergies.     Social History:  The patient currently lives home    TOBACCO:   reports that she has never smoked. She has never used smokeless tobacco.  ETOH:   reports no history of alcohol use. Family History:  Reviewed in detail and negative for DM, Early CAD, Cancer, CVA. Positive as follows:        Problem Relation Age of Onset    High Blood Pressure Mother     Hypertension Mother     High Cholesterol Mother     Macular Degen Mother     Thyroid Disease Mother     Cancer Father     Alcohol Abuse Father     High Blood Pressure Sister     High Cholesterol Sister     Thyroid Disease Sister     Heart Disease Brother     Alcohol Abuse Brother        REVIEW OF SYSTEMS:    and as noted in the HPI. All other systems reviewed and negative. PHYSICAL EXAM:    BP (!) 122/57   Pulse 74   Temp 98.6 °F (37 °C) (Rectal)   Resp 20   Ht 5' 1\" (1.549 m)   Wt 145 lb 8 oz (66 kg)   SpO2 100%   BMI 27.49 kg/m²     General appearance: Chronically ill-appearing, fatigued appearing, no acute respiratory distress, pale appearing, alert and oriented x4  HEENT Normal cephalic, atraumatic without obvious deformity. Pupils equal, round, and reactive to light. Extra ocular muscles intact. Pale conjunctiva, anicteric sclera, dry mucous membranes  Neck: Supple, no JVD  Lungs: Clear breath sounds bilaterally  Heart: Regular rate and rhythm, no murmurs noted  Abdomen: Soft, nontender, nondistended, active bowel sounds  Extremities: 2+ bilateral lower extremity pitting edema symmetrically  Skin: No rashes noted  Neurologic: Grossly intact neurologically at this time moves all extremities with 5 out of 5 strength  Mental status: Alert, oriented, thought content appropriate. Capillary Refill: Acceptable  < 3 seconds  Peripheral Pulses: +3 Easily felt, not easily obliterated with pressure    08/01/22 0406  CT HEAD WO CONTRAST   Performed: 08/01/22 2154  Final        Impression: No acute intracranial abnormality.        08/01/22 0328  XR CHEST PORTABLE   Performed: 08/01/22 7463 Final        Impression: Clear chest without acute cardiopulmonary process. CBC   Recent Labs     08/01/22 0339   WBC 8.6   HGB 8.2*   HCT 25.3*   *      RENAL  Recent Labs     08/01/22 0339      K 2.6*   CL 98*   CO2 28   BUN 19   CREATININE 0.6     LFT'S  Recent Labs     08/01/22 0339   AST 21   ALT 21   BILIDIR <0.2   BILITOT <0.2   ALKPHOS 98     COAG  No results for input(s): INR in the last 72 hours.   CARDIAC ENZYMES  Recent Labs     08/01/22 0339 08/01/22  0601   CKTOTAL 80 61   TROPONINI 0.02* <0.01       U/A:    Lab Results   Component Value Date/Time    NITRITE NEG 10/20/2020 10:55 AM    COLORU Yellow 05/29/2022 06:21 PM    WBCUA 289 05/29/2022 06:21 PM    RBCUA 169 05/29/2022 06:21 PM    MUCUS Present 05/29/2022 06:21 PM    BACTERIA 4+ 05/29/2022 06:21 PM    CLARITYU TURBID 05/29/2022 06:21 PM    SPECGRAV 1.026 05/29/2022 06:21 PM    LEUKOCYTESUR MODERATE 05/29/2022 06:21 PM    BLOODU LARGE 05/29/2022 06:21 PM    GLUCOSEU Negative 05/29/2022 06:21 PM       ABG  No results found for: NOA4BXI, BEART, A1CNCXXN, PHART, THGBART, BDX6WAD, PO2ART, COB7TSN        Active Hospital Problems    Diagnosis Date Noted    Electrolyte abnormality [E87.8] 08/01/2022     Priority: Medium    Hypoglycemia [E16.2] 08/01/2022     Priority: Medium    Generalized weakness [R53.1] 05/29/2022     Priority: Medium    DMII (diabetes mellitus, type 2) ([facility] [address] [E11.9] 01/25/2016   Syncope  Leg swelling  Malignancy      PHYSICIANS CERTIFICATION:    I certify that Milagro Aguilar is expected to be hospitalized for greater than 2 midnights based on the following assessment and plan:      ASSESSMENT/PLAN:  Syncope  Hypoglycemia  Electrolyte abnormality  Generalized weakness  Leg swelling  Type 2 diabetes  Malignancy    Plan:  Suspect patient's syncopal episode is likely more secondary due to patient's hypoglycemia that might have been worsened by her use of Lantus while not eating properly but due to lack of insight into patient's syncopal episode and no one to witness the event, need to consider further work-up. Multiple electrolyte abnormalities also noted and these also need to be considered and repleted  Monitor for any infectious symptoms, did not obtain a urine sample yet  Repleting calcium/magnesium/potassium  Every 2 hour Accu-Cheks for now, hypoglycemia protocol in place  Start patient on sodium chloride solution in dextrose 10% combination solution running at 125/h x 16 hours for IV fluid hydration  Consider stopping the IV fluid if patient's blood sugar continues to be above 70  Obtain transthoracic echo in the morning  Obtain orthostatic blood pressure when possible  Repeat renal function panel and magnesium at noon, consider further lab work later  Oncology consult for malignancy        DVT Prophylaxis: Lovenox  Diet: ADULT DIET; Regular  Code Status: DNR-CCA  PT/OT Eval Status: Ambulatory    Dispo -pending clinical course       Jimmy Milton DO    Thank you HERIBERTO Dyson CNP for the opportunity to be involved in this patient's care. If you have any questions or concerns please feel free to contact me at [phone].

## 2022-08-01 NOTE — ED NOTES
Godwin Leal RN wound care nurse notified of patient wounds, to send a barrier cream for vaginal excoriation and assess buttocks wound when patient is roomed.       Kristal Smith RN  08/01/22 8168

## 2022-08-01 NOTE — ED NOTES
Pt in route to IP bed, no distress noted at time of departure from ER.       Davin Fitzgerald RN  08/01/22 9709

## 2022-08-01 NOTE — ED NOTES
Rounded on patient, linens changed, incontinence care provided, barrier cream applied.      Grisel Be RN  08/01/22 8212

## 2022-08-01 NOTE — ED NOTES
Redness, excoriated noted to groin, redness, open area to buttocks, wound consult ordered.       Ifrah Mabry RN  08/01/22 9773

## 2022-08-01 NOTE — ED NOTES
Dr Anthony Florian notified of critical calcium and potassium results via perfect serve.       Violet Stack RN  08/01/22 8691

## 2022-08-02 NOTE — PROGRESS NOTES
Physical Therapy  Facility/Department: [facility]  Physical Therapy Initial Assessment    Name: Justyna Lawrence  : 1955  MRN: [id number]  Date of Service: 2022    Discharge Recommendations:  24 hour supervision or assist, Home with Home health PT, Patient would benefit from continued therapy after discharge   PT Equipment Recommendations  Equipment Needed: No      Patient Diagnosis(es): The primary encounter diagnosis was Hypoglycemia. A diagnosis of Hypothermia, initial encounter was also pertinent to this visit. Past Medical History:  has a past medical history of Congenital prolapsed rectum, Displaced bladder, Enlarged, heterogeneous thyroid, General weakness, Hernia, Mixed hyperlipidemia, Septic shock ([address] Severe malnutrition ([address] Type 2 diabetes mellitus without complication ([address] Varicose vein of leg, vulvar cancer, and Vulvar mass. Past Surgical History:  has a past surgical history that includes [address] Surgery (N/A, 2022). Assessment   Body Structures, Functions, Activity Limitations Requiring Skilled Therapeutic Intervention: Decreased functional mobility   Assessment: Pt is a 78 y/o female who has been receiving radiation and chemo for vulvar cancer who was found down in her bathroom. Found to have hypoglycemia and electrolyte abnormality. Pt lives with her spouse who is also in poor health in a one level home with 1 DON and was indep ambulating with a RW PTA. Today, pt limited by severe pain from radiation burns in benita area at radiation site and pt unable to sit. She moved from sidelying <-> standing with CGA-Cesar and ambulated greater than household distance with RW and CGA-SBA but requires extended amount of time to complete all tasks due to severe pain. Recommend 24 hour assist upon d/c which sister states she can provide. Pt will benefit from home health PT upon d/c to address functional mobility, activity tolerance and balance.   Will continue to follow. Treatment Diagnosis: decreased functional mobility  Therapy Prognosis: Good  Decision Making: Medium Complexity  History: Per Lisa Hall, DO \"The patient is a 79 y.o. 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 She was found by her  who is not at bedside but patient sister is at bedside who knows all the details. Patient apparently finished last round of chemotherapy this past Thursday and then also got radiation therapy on Friday and although she has generalized fatigue at baseline due to her treatments she did feel worse this past Friday going into the weekend from the most recent treatments. Her appetite has been diminished for a long time but over the last few days it has been increasingly worse. She was able to take all of her medications and notes that she was taking her Lantus for hyperglycemia however she has let her blood sugars get low into the 50s at times in the morning but was still attempting to keep her sugar elevated by taking in enough oral intake but overall her appetite is diminished. She was increasingly fatigued over the course of the last day and that she was apparently from the bathroom unresponsive on the floor. She does not remember the event itself but does remember going to the bathroom. Apart from the symptoms of fatigue and generalized weakness and syncope, she denies other recent symptoms of fever, chills, dysuria, blood in urine/stool/sputum, vomiting/diarrhea/abdominal pain, chest pain, shortness of breath. \"  Exam: functional mobility, ROM, strength  Clinical Presentation: evolving  Barriers to Learning: none  Requires PT Follow-Up: Yes  Activity Tolerance  Activity Tolerance: Patient limited by pain;Treatment limited secondary to medical complications  Activity Tolerance Comments: limited by severe radiation burns in benita area     Plan   Plan  Plan: 3-5 times per week  Current Treatment Recommendations: Functional mobility training, Gait training, Transfer training, Safety education & training, Patient/Caregiver education & training, Endurance training, Positioning  Safety Devices  Type of Devices: Gait belt, All fall risk precautions in place, Bed alarm in place, Call light within reach, Left in bed, Nurse notified (RN [place] notified)  Restraints  Restraints Initially in Place: No     Restrictions  Restrictions/Precautions  Restrictions/Precautions: Fall Risk, Contact Precautions  Position Activity Restriction  Other position/activity restrictions: MDRO and ESBL in urine     Subjective   General  Chart Reviewed: Yes  Patient assessed for rehabilitation services?: Yes  Additional Pertinent Hx: Per aRchana Ribera, DO \"The patient is a 79 y.o. 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 She was found by her  who is not at bedside but patient sister is at bedside who knows all the details. Patient apparently finished last round of chemotherapy this past Thursday and then also got radiation therapy on Friday and although she has generalized fatigue at baseline due to her treatments she did feel worse this past Friday going into the weekend from the most recent treatments. Her appetite has been diminished for a long time but over the last few days it has been increasingly worse.   She was able to take all of her medications and notes that she was taking her Lantus for hyperglycemia however she has let her blood sugars get low into the 50s at times in the morning but was still attempting to keep her sugar elevated by taking in enough oral intake but overall her appetite is diminished. She was increasingly fatigued over the course of the last day and that she was apparently from the bathroom unresponsive on the floor. She does not remember the event itself but does remember going to the bathroom. Apart from the symptoms of fatigue and generalized weakness and syncope, she denies other recent symptoms of fever, chills, dysuria, blood in urine/stool/sputum, vomiting/diarrhea/abdominal pain, chest pain, shortness of breath. \"  Response To Previous Treatment: Not applicable  Family / Caregiver Present: Yes (sister)  Referring Practitioner: Carole Romano MD  Referral Date : 08/02/22  Diagnosis: electrolyte abnormality, hypoglycemia  Follows Commands: Within Functional Limits  Subjective  Subjective: Pt supine in bed upon arrival.  Agreeable to PT eval and treat. Reports concerns about getting around at home due to weakness after cancer treatments.          Social/Functional History  Social/Functional History  Lives With: Spouse  Type of Home: House  Home Layout: One level  Home Access: Stairs to enter without rails  Entrance Stairs - Number of Steps: 1 small step to enter  Bathroom Shower/Tub: Tub/Shower unit  Sachse Toilet: Handicap height  Home Equipment: david Wilson  Has the patient had two or more falls in the past year or any fall with injury in the past year?: Yes (2-3 falls before she knew she had cancer)  ADL Assistance: Independent  Homemaking Assistance: Independent  Ambulation Assistance: Independent (with RW)  Transfer Assistance: Independent  Active : No  Additional Comments:  home all the time but is currently ill; sister and daughter available to help at home  Vision/Hearing  Vision  Vision: Impaired  Vision Exceptions: Wears glasses at all times  Hearing  Hearing: Within functional limits    Cognition   Orientation  Overall Orientation Status: Within Functional Limits     Objective        Gross Assessment  Sensation: Impaired (reports numbness and tingling in BLEs since chemo)     AROM RLE (degrees)  RLE General AROM: limited by pain  AROM LLE (degrees)  LLE General AROM: limited by pain  Strength RLE  Comment: not formally tested to due pain at radiation site but strength grossly 4/5 BLEs demonstrated by functional mobility; limited by pain           Bed mobility  Bridging: Stand by assistance  Rolling to Left: Stand by assistance  Rolling to Right: Stand by assistance  Supine to Sit: Contact guard assistance (pt moved from sidelying to standing due to inability to sit due to pain from radiation in benita area)  Sit to Supine: Minimal assistance (pt moved from standing to sidelying in bed due to inability to sit due to pain from radiation at benita area)  Scooting: Stand by assistance  Bed Mobility Comments: limited by pain and pt required extended amount of time to complete all bed mobility    Transfers  Sit to Stand: Contact guard assistance  Stand to sit: Contact guard assistance  Comment: pt moved from sidelying <-> standing due to inability to sit due to pain; pt squated over the toilet to have BM and urinate    Ambulation  Surface: level tile  Device: Rolling Walker  Other Apparatus:  (therapist managed IV)  Assistance: Stand by assistance  Quality of Gait: very slow jaime due to pain, forward flexed posture and behind walker base requiring cues to correct  Gait Deviations: Slow Jaime;Decreased step length;Decreased step height  Distance: approx [de-identified]' with multiple turns  Comments: several standing rest breaks; took extended amount of time to complete  More Ambulation?: No  Stairs/Curb  Stairs?: No     Balance  Posture: Fair  Sitting - Static:  (unable to assess; pt cannot sit due to pain)  Standing - Static: Fair  Standing - Dynamic: Fair                                                         AM-PAC Score  AM-PAC Inpatient Mobility Raw Score : 14 (08/02/22 3000)  AM-PAC Inpatient T-Scale Score : 38.1 (08/02/22 1616)  Mobility Inpatient CMS 0-100% Score: 61.29 (08/02/22 1616)  Mobility Inpatient CMS G-Code Modifier : CL (08/02/22 1616)                 Goals  Short Term Goals  Time Frame for Short term goals: upon d/c  Short term goal 1: transfers SBA  Short term goal 2: ambulate 80' with LRAD supervision  Patient Goals   Patient goals : \"to go home with home health\"       Education  Patient Education  Education Given To: Patient; Family  Education Provided: Role of Therapy;Plan of Care;Transfer Training;Equipment  Education Provided Comments: d/c recommendation  Education Method: Verbal  Barriers to Learning: None  Education Outcome: Verbalized understanding;Continued education needed      Therapy Time   Individual Concurrent Group Co-treatment   Time In 1455         Time Out 1610         Minutes 75         Timed Code Treatment Minutes: 60 Minutes       Electronically signed by Babak Knight on 8/2/2022 at 4:18 PM

## 2022-08-02 NOTE — PLAN OF CARE
Problem: Discharge Planning  Goal: Discharge to home or other facility with appropriate resources  Outcome: Progressing   Alert and oriented x4 Resp. Easy and even Sats. WNL on RA Lungs diminished Cough and deep breathing exercises encouraged Medicated x2 for c/o pain with fair short term effect. Cont. Per bedpan with episodes of incont. Benita-care prn Cream applied to coccyx and benita-area as rxd.  Refuses to turn even with explanation and encouragement given Free from fall/injury

## 2022-08-02 NOTE — PROCEDURES
Complete echo ordered 8/1 for syncope, first attempt to scan patient made at 8:40am. Per RN patient's electrolyte level was low, asked to try again later in the afternoon. Second attempt made at 1:14p patient refused exam. RN Aware.

## 2022-08-02 NOTE — PROGRESS NOTES
Hospitalist Progress Note  8/2/2022 9:26 AM    PCP: [facility], APRN - CNP    [id number]     Date of Admission: 8/1/2022                                                                                                                     HOSPITAL COURSE    Patient demographics:  The patient  Gelacio Steinberg is a 79 y.o. female     Significant past medical history:   Patient Active Problem List   Diagnosis    DMII (diabetes mellitus, type 2) ([facility] [address]    Duodenal ulcer    Pain in both lower extremities    Varicosities of leg    Elevated blood pressure reading    Mixed hyperlipidemia    Type 2 diabetes mellitus with diabetic neuropathy    Acute cystitis with hematuria    Severe sepsis (HCC)    Septic shock (HCC)    Tachycardia    Neutrophilic leukocytosis    Elevated lactic acid level    Elevated liver function tests    Generalized weakness    Fatigue    Vulvar mass    Enlarged, heterogeneous thyroid    Severe malnutrition (HCC)    Electrolyte abnormality    Hypoglycemia         Presenting symptoms:  Syncope, generalized weakness, fatigue, lightheadedness, nausea, decreased appetite    Diagnostic workup:      CONSULTS DURING ADMISSION :   IP CONSULT TO ONCOLOGY      Patient was diagnosed with:  Syncope  Hypoglycemia  Electrolyte abnormality  Generalized weakness  Leg swelling  Type 2 diabetes  Malignancy      Treatment while inpatient:  Pt presented to [facility] brought via EMS after being found in her bathroom unresponsive for an uncertain amount of time but was likely at least an hour. Patient most likely had an episode of for hypoglycemia. Patient has history of valvular cancer for which patient has completed chemotherapy. Patient has also completed radiation therapy except for the last 1-2 treatments due to very  Reaction. Patient was provided supplements for hypokalemia and hypomagnesemia. Patient was also given a calcium supplements due to hypocalcemia.   Patient's parathyroid hormone was elevated which most likely is due to hypocalcemia.                                                                                ----------------------------------------------------------      SUBJECTIVE COMPLAINTS- follow up for syncope    Diet: ADULT DIET; Regular      OBJECTIVE:   Patient Active Problem List   Diagnosis    DMII (diabetes mellitus, type 2) ([facility] [address]    Duodenal ulcer    Pain in both lower extremities    Varicosities of leg    Elevated blood pressure reading    Mixed hyperlipidemia    Type 2 diabetes mellitus with diabetic neuropathy    Acute cystitis with hematuria    Severe sepsis (HCC)    Septic shock (HCC)    Tachycardia    Neutrophilic leukocytosis    Elevated lactic acid level    Elevated liver function tests    Generalized weakness    Fatigue    Vulvar mass    Enlarged, heterogeneous thyroid    Severe malnutrition (HCC)    Electrolyte abnormality    Hypoglycemia       Allergies  Patient has no known allergies.     Medications    Scheduled Meds:   ferrous sulfate  324 mg Oral Every Other Day    sodium chloride flush  5-40 mL IntraVENous 2 times per day    enoxaparin  40 mg SubCUTAneous Daily    rosuvastatin  5 mg Oral Nightly    insulin lispro  0-8 Units SubCUTAneous TID WC    insulin lispro  0-4 Units SubCUTAneous Nightly    magnesium oxide  400 mg Oral Daily     Continuous Infusions:   dextrose      sodium chloride 50 mL (08/02/22 0645)     PRN Meds:  morphine, naloxone, glucose, dextrose bolus **OR** dextrose bolus, glucagon (rDNA), dextrose, sodium chloride flush, sodium chloride, polyethylene glycol, acetaminophen **OR** acetaminophen, ondansetron, perflutren lipid microspheres, magnesium sulfate, potassium chloride    Vitals   Vitals /wt Patient Vitals for the past 8 hrs:   BP Temp Temp src Resp SpO2 Weight   08/02/22 0748 -- -- -- -- 94 % --   08/02/22 0513 -- -- -- 19 -- --   08/02/22 0454 111/72 98.1 °F (36.7 °C) Oral -- 94 % 143 lb 15.4 oz (65.3 kg)        72HR INTAKE/OUTPUT: Intake/Output Summary (Last 24 hours) at 8/2/2022 0926  Last data filed at 8/2/2022 0615  Gross per 24 hour   Intake 500 ml   Output 900 ml   Net -400 ml       Exam:    Gen:   Alert and oriented ×3    Eyes: PERRL. No sclera icterus. No conjunctival injection. ENT: No discharge. Pharynx clear. External appearance of ears and nose normal.  Neck: Trachea midline. No obvious mass. Resp: No accessory muscle use. No crackles. No wheezes. No rhonchi. CV: Regular rate. Regular rhythm. No murmur or rub. No edema. GI: Non-tender. Non-distended. No hernia. Skin: Warm, dry, normal texture and turgor. Lymph: No cervical LAD. No supraclavicular LAD. M/S: / Ext. No cyanosis. No clubbing. No joint deformity. Neuro: CN 2-12 are intact,  no neurologic deficits noted. PT/INR: No results for input(s): PROTIME, INR in the last 72 hours. APTT: No results for input(s): APTT in the last 72 hours. CBC:   Recent Labs     08/01/22 0339   WBC 8.6   HGB 8.2*   HCT 25.3*   MCV 88.7   *       BMP:   Recent Labs     08/01/22  0339 08/01/22  1311 08/01/22  1511 08/02/22  0510    132*  --  138   K 2.6* 7.8* 3.3* 3.2*   CL 98* 102  --  101   CO2 28 24  --  25   PHOS  --  1.5*  --   --    BUN 19 11  --  9   CREATININE 0.6 <0.5*  --  <0.5*       LIVER PROFILE:   Recent Labs     08/01/22 0339   ALKPHOS 98   AST 21   ALT 21   BILIDIR <0.2   BILITOT <0.2     No results for input(s): AMYLASE in the last 72 hours. No results for input(s): LIPASE in the last 72 hours. UA:No results for input(s): NITRITE, LABCAST, WBCUA, RBCUA, MUCUS in the last 72 hours. TROPONIN:   Recent Labs     08/01/22  0339 08/01/22  0601   CKTOTAL 80 61   TROPONINI 0.02* <0.01       Lab Results   Component Value Date/Time    URRFLXCULT Yes 05/29/2022 06:21 PM       No results for input(s): TSHREFLEX in the last 72 hours.     No components found for: [id number]  POC GLUCOSE:    Recent Labs     08/01/22  0630 08/01/22  0802 08/01/22  2157 08/01/22 2123 08/02/22  0800   POCGLU 93 99 182* 312* 142*     No results for input(s): LABA1C in the last 72 hours. 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 Tammie Quiñonez MD    This note was transcribed using [id number] "[employer]". Please disregard any translational errors.

## 2022-08-02 NOTE — PROGRESS NOTES
[facility]  Diabetes Education   Progress Note       NAME:  Delphine Fernando  MEDICAL RECORD NUMBER:  [id number]  AGE: 79 y.o. GENDER: female  : 1955  TODAY'S DATE:  2022    Subjective   Reason for Diabetic Education Evaluation and Assessment: hypoglycemia       Jessica Maldonado and hr sister agree to meet with me for diabetes support.       Visit Type: evaluation      Delphine Fernando is a 79 y.o. female referred by:     [] Physician  [] Nursing  [x] Chart Review   [] Other:     PAST MEDICAL HISTORY        Diagnosis Date    Congenital prolapsed rectum     Displaced bladder     Enlarged, heterogeneous thyroid 2022    General weakness     Hernia     Mixed hyperlipidemia 10/14/2019    Septic shock ([facility] [address]     2022    Severe malnutrition ([facility] [address] 2022    Type 2 diabetes mellitus without complication ([facility] [address]     Varicose vein of leg     vulvar cancer     vulva    Vulvar mass        PAST SURGICAL HISTORY    Past Surgical History:   Procedure Laterality Date    PORT SURGERY N/A 2022    PORT A CATHETER INSERTION (NON ACCESSED) performed by Ayaz Burnham MD at [address] [place] [address] Road History   Problem Relation Age of Onset    High Blood Pressure Mother     Hypertension Mother     High Cholesterol Mother     Macular Degen Mother     Thyroid Disease Mother     Cancer Father     Alcohol Abuse Father     High Blood Pressure Sister     High Cholesterol Sister     Thyroid Disease Sister     Heart Disease Brother     Alcohol Abuse Brother        SOCIAL HISTORY    Social History     Tobacco Use    Smoking status: Never    Smokeless tobacco: Never   Vaping Use    Vaping Use: Never used   Substance Use Topics    Alcohol use: No     Alcohol/week: 0.0 standard drinks    Drug use: No       ALLERGIES    No Known Allergies    MEDICATIONS     potassium phosphate (monobasic)  500 mg Oral TID    ferrous sulfate  324 mg Oral Every Other Day    sodium chloride flush  5-40 mL the patient/caregiver monitor Blood Glucoses? Yes - usually tests twice daily. Reviewed glycemic control targets, testing frequency and when to call PCP. Level of patient/caregiver understanding able to:        [] Verbalized Understanding   [] Demonstrated Understanding       [] Teach Back       [x] Needs Reinforcement     []  Other:        Does the patient/caregiver follow a Meal Plan? No: Uses glucerna at home. Eating less than usual.     Reviewed importance of eating three meals per day. Recommend meal replacements if not eating full meals. Level of patient/caregiver understanding able to:       [] Verbalized Understanding   [] Demonstrated Understanding       [] Teach Back       [x] Needs Reinforcement     []  Other:      Does the patient/caregiver understand S/S of Hypoglycemia? No:   Reviewed symptoms, prevention and treatment. Level of patient/caregiver understanding able to:       [x] Verbalized Understanding   [] Demonstrated Understanding       [] Teach Back       [] Needs Reinforcement     []  Other:                    Does the patient/caregiver understand S/S of Hyperglycemia? No:     Reviewed symptoms, prevention and treatment. Level of patient/caregiver understanding able to:        [] Verbalized Understanding   [] Demonstrated Understanding       [] Teach Back       [x] Needs Reinforcement     []  Other:           Plan        Ongoing diabetes education and blood glucose monitoring.       Dietary Consult Made:  Yes        Teaching Time Diabetes Education:  30 minutes     Electronically signed by Consuelo Rush on 8/2/2022 at 4:54 PM

## 2022-08-02 NOTE — PROGRESS NOTES
Hematology Oncology Daily Progress Note    Admit Date: 8/1/2022  Hospital day several    Subjective:     Patient has complaints of ongoing perineal pain that is quite severe--denies sob/cp. Medication side effects: none    Scheduled Meds:   calcium gluconate-NaCl  1,000 mg IntraVENous Once    potassium phosphate (monobasic)  500 mg Oral TID    ferrous sulfate  324 mg Oral Every Other Day    sodium chloride flush  5-40 mL IntraVENous 2 times per day    enoxaparin  40 mg SubCUTAneous Daily    rosuvastatin  5 mg Oral Nightly    insulin lispro  0-8 Units SubCUTAneous TID WC    insulin lispro  0-4 Units SubCUTAneous Nightly    magnesium oxide  400 mg Oral Daily     Continuous Infusions:   dextrose      sodium chloride 50 mL (08/02/22 0645)     PRN Meds:morphine, naloxone, glucose, dextrose bolus **OR** dextrose bolus, glucagon (rDNA), dextrose, sodium chloride flush, sodium chloride, polyethylene glycol, acetaminophen **OR** acetaminophen, ondansetron, perflutren lipid microspheres, magnesium sulfate, potassium chloride    Review of Systems  Pertinent items are noted in HPI. REVIEW OF SYSTEMS:         Constitutional: Denies fever, sweats, weight loss     Eyes: No visual changes or diplopia. No scleral icterus. ENT: No Headaches, hearing loss or vertigo. No mouth sores or sore throat. Cardiovascular: No chest pain, dyspnea on exertion, palpitations or loss of consciousness. Respiratory: No cough or wheezing, no sputum production. No hemoptysis. .    Gastrointestinal: No abdominal pain, appetite loss, blood in stools. No change in bowel habits. Genitourinary: No dysuria, trouble voiding, or hematuria. Musculoskeletal:  Generalized weakness. No joint complaints. Integumentary: No rash or pruritis. Neurological: No headache, diplopia. No change in gait, balance, or coordination. No paresthesias. Endocrine: No temperature intolerance. No excessive thirst, fluid intake, or urination.    Hematologic/Lymphatic: No abnormal bruising or ecchymoses, blood clots or swollen lymph nodes. Allergic/Immunologic: No nasal congestion or hives. Objective:     Patient Vitals for the past 8 hrs:   BP Temp Temp src Pulse Resp SpO2 Weight   08/02/22 1153 (!) 105/50 98 °F (36.7 °C) Oral 87 15 94 % --   08/02/22 0748 -- -- -- -- -- 94 % --   08/02/22 0513 -- -- -- -- 19 -- --   08/02/22 0454 111/72 98.1 °F (36.7 °C) Oral -- -- 94 % 143 lb 15.4 oz (65.3 kg)     I/O last 3 completed shifts: In: 799.1 [I.V.:210; IV Piggyback:589.1]  Out: 900 [Urine:900]  No intake/output data recorded.     BP (!) 105/50   Pulse 87   Temp 98 °F (36.7 °C) (Oral)   Resp 15   Ht 5' 1\" (1.549 m)   Wt 143 lb 15.4 oz (65.3 kg)   SpO2 94%   BMI 27.20 kg/m²     General Appearance:    Alert, cooperative, no distress, appears stated age   Head:    Normocephalic, without obvious abnormality, atraumatic   Eyes:    PERRL, conjunctiva/corneas clear, EOM's intact, fundi     benign, both eyes        Ears:    Normal TM's and external ear canals, both ears   Nose:   Nares normal, septum midline, mucosa normal, no drainage    or sinus tenderness   Throat:   Lips, mucosa, and tongue normal; teeth and gums normal   Neck:   Supple, symmetrical, trachea midline, no adenopathy;        thyroid:  No enlargement/tenderness/nodules; no carotid    bruit or JVD   Back:     Symmetric, no curvature, ROM normal, no CVA tenderness   Lungs:     Clear to auscultation bilaterally, respirations unlabored   Chest wall:    No tenderness or deformity   Heart:    Regular rate and rhythm, S1 and S2 normal, no murmur, rub   or gallop   Abdomen:     Soft, non-tender, bowel sounds active all four quadrants,     no masses, no organomegaly           Extremities:   Extremities normal, atraumatic, no cyanosis or edema   Pulses:   2+ and symmetric all extremities   Skin:   Skin color, texture, turgor normal, no rashes or lesions   Lymph nodes:   Cervical, supraclavicular, and axillary nodes normal Neurologic:   CNII-XII intact. Normal strength, sensation and reflexes       throughout       Data ReviewCBC:   Lab Results   Component Value Date/Time    WBC 8.6 08/01/2022 03:39 AM    RBC 2.85 08/01/2022 03:39 AM       Assessment:     Principal Problem:    Hypoglycemia  Active Problems:    Generalized weakness    Electrolyte abnormality    DMII (diabetes mellitus, type 2) ([facility] [address]  Resolved Problems:    * No resolved hospital problems. *      Plan:     1.  Vulvar cancer. She completed chemotherapy. She is only with 3 radiation therapy treatments left. She is refused to complete those due to her severe perineal reaction. I spoke with radiation oncology who will see her today to see if there are any topical agents that may help. 2.  Hypokalemia. Replace. 3.  Infectious disease. She is afebrile.         Electronically signed by Khadra Shahid MD on 8/2/2022 at 12:22 PM

## 2022-08-02 NOTE — ADT AUTH CERT
Utilization Reviews         Diabetes, Hypoglycemia - Care Day 2 (8/2/2022) by Vandana Harris RN       Review Status Review Entered   Completed 8/2/2022 16:02      Criteria Review      Care Day: 2 Care Date: 8/2/2022 Level of Care: Intermediate Care    Guideline Day 2    Clinical Status    (X) * Hemodynamic stability    (X) * Mental status at baseline    8/2/2022 4:02 PM EDT by Amanda Maurer and oriented ×3    (X) * Neurologic or other findings induced by hypoglycemia absent    (X) * Serum glucose acceptable without parenteral support    (X) * Possible etiologies identified and outpatient follow-up acceptable    ( ) * Discharge plans and education understood    Activity    (X) * Ambulatory or acceptable for next level of care    Routes    (X) * Oral hydration    (X) * Oral medications or regimen acceptable for next level of care    (X) * Oral diet or acceptable for next level of care    8/2/2022 4:02 PM EDT by Phyllis Luna      regular    Interventions    (X) Glucose monitoring    Medications    (X) * Outpatient diabetic medication regimen established    * Milestone   Additional Notes   DATE: 8/2 - CD2         VITALS: BP (!) 105/50   Pulse 87   Temp 98 °F (36.7 °C) (Oral)   Resp 15   Ht 5' 1\" (1.549 m)   Wt 143 lb 15.4 oz (65.3 kg)   SpO2 94%   BMI 27.20 kg/m²          ABNL/PERTINENT LABS/RADIOLOGY/DIAGNOSTIC STUDIES:   8/2/22 05:10   CALCIUM, SERUM, 013982: 6.1 (L)   GLUCOSE, FASTING,GF: 137 (H)   PTH: 152.7 (H)      8/2/22 14:13   Clarity, UA: TURBID ! Glucose, UA: 100 ! Bilirubin, Urine: Negative   Ketones, Urine: Negative   Specific Gravity, UA: 1.017   Blood, Urine: MODERATE !   pH, UA: 5.0   Protein, UA: 30 ! Urobilinogen, Urine: 0.2   Nitrite, Urine: Negative   Leukocyte Esterase, Urine: LARGE ! Urine Type: NotGiven   Urine Reflex to Culture: Yes   Hyaline Casts, UA: 2   WBC, UA: 1694 (H)   RBC, UA: 75 (H)   Epithelial Cells, UA: 3   Bacteria, UA: 2+ !    Microscopic Examination: YES PHYSICAL EXAM:   Gen:   Alert and oriented ×3     Eyes: PERRL. No sclera icterus. No conjunctival injection. ENT: No discharge. Pharynx clear. External appearance of ears and nose normal.   Neck: Trachea midline. No obvious mass. Resp: No accessory muscle use. No crackles. No wheezes. No rhonchi. CV: Regular rate. Regular rhythm. No murmur or rub. No edema. GI: Non-tender. Non-distended. No hernia. Skin: Warm, dry, normal texture and turgor. Lymph: No cervical LAD. No supraclavicular LAD. M/S: / Ext. No cyanosis. No clubbing. No joint deformity. Neuro: CN 2-12 are intact,  no neurologic deficits noted. 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  Vulvar cancer. She completed chemotherapy. She is only with 3 radiation therapy treatments left. She is refused to complete those due to her severe perineal reaction. I spoke with radiation oncology who will see her today to see if there are any topical agents that may help. 2.  Hypokalemia. Replace. 3.  Infectious disease. She is afebrile.             MEDICATIONS:   potassium phosphate (monobasic), 500 mg, Oral, TID   ferrous sulfate, 324 mg, Oral, Every Other Day   enoxaparin, 40 mg, SubCUTAneous, Daily   rosuvastatin, 5 mg, Oral, Nightly   insulin lispro, 0-8 Units, SubCUTAneous, TID WC   insulin lispro, 0-4 Units, SubCUTAneous, Nightly   magnesium oxide, 400 mg, Oral, Daily      calcium gluconate 1000 mg in sodium chloride 50 mL   Dose: 1,000 mg   Freq: ONCE Route: IV      magnesium sulfate 1000 mg in dextrose 5% 100 mL IVPB   Dose: 1,000 mg   Freq: PRN Route: IV   X2      morphine (PF) injection 2 mg   Dose: 2 mg   Freq: EVERY 4 HOURS PRN Route: IV   PRN Reasons: Pain Severe (7-10),Pain Moderate (4-6)   PRN Comment: body pain   X4      potassium chloride 20 mEq/50 mL IVPB (Central Line)   Dose: 20 mEq   Freq: PRN Route: IV   X2

## 2022-08-03 NOTE — FLOWSHEET NOTE
provided emotional support and encouragement for patient. Patient is hoping to go home today and has declined additional radiation/chemotherapy.  notified nurse of patient's request for pain medication.

## 2022-08-03 NOTE — PLAN OF CARE
Problem: Discharge Planning  Goal: Discharge to home or other facility with appropriate resources  8/3/2022 1807 by Steven Anderson RN  Outcome: Adequate for Discharge  8/3/2022 1623 by Steven Anderson RN  Outcome: Progressing  8/3/2022 0409 by Myranda Flynn RN  Outcome: Progressing     Problem: Pain  Goal: Verbalizes/displays adequate comfort level or baseline comfort level  8/3/2022 1807 by Steven Anderson RN  Outcome: Adequate for Discharge  8/3/2022 1623 by Steven Anderson RN  Outcome: [place] Night  8/3/2022 0409 by Myranda Flynn RN  Outcome: Progressing     Problem: Skin/Tissue Integrity  Goal: Absence of new skin breakdown  Description: 1. Monitor for areas of redness and/or skin breakdown  2. Assess vascular access sites hourly  3. Every 4-6 hours minimum:  Change oxygen saturation probe site  4. Every 4-6 hours:  If on nasal continuous positive airway pressure, respiratory therapy assess nares and determine need for appliance change or resting period.   8/3/2022 1807 by Steven Anderson RN  Outcome: Adequate for Discharge  8/3/2022 1623 by Steven Anderson RN  Outcome: Progressing  8/3/2022 0409 by Myranda Flynn RN  Outcome: Progressing     Problem: Safety - Adult  Goal: Free from fall injury  8/3/2022 1807 by Steven Anderson RN  Outcome: Adequate for Discharge  8/3/2022 1623 by Steven Anderson RN  Outcome: [place] Night  8/3/2022 0409 by Myranda Flynn RN  Outcome: Niru Lee (Taken 8/2/2022 1634 by Niru Lay RN)  Free From Fall Injury:   Instruct family/caregiver on patient safety   Based on caregiver fall risk screen, instruct family/caregiver to ask for assistance with transferring infant if caregiver noted to have fall risk factors     Problem: Chronic Conditions and Co-morbidities  Goal: Patient's chronic conditions and co-morbidity symptoms are monitored and maintained or improved  8/3/2022 1807 by Steven Anderson RN  Outcome: Adequate for Discharge  8/3/2022 9976 by Anahi Gillis RN  Outcome: Progressing     Problem: Nutrition Deficit:  Goal: Optimize nutritional status  8/3/2022 1807 by James Oneal RN  Outcome: Adequate for Discharge  8/3/2022 1623 by James Oneal RN  Outcome: Progressing  8/3/2022 1153 by Jonathan Calderón, RD, LD  Outcome: Progressing

## 2022-08-03 NOTE — DISCHARGE INSTR - PHARMACY
[address] [place] [address] Drive. [address] [place] [address] Alex Babb.  [phone]

## 2022-08-03 NOTE — CARE COORDINATION
Attempted to see pt again. She again refuses to allow me to assess her. She tells me that she is waiting for labs to be drawn at 3 and then she is hoping to go home. She tells me current tx seems to be helping somewhat. Per RN, Oziel Aguiar is excoriated and red, but nothing is open. She has elected to forego her last 3 radiation treatments; chemo has been completed.  Art Elder, MSN, RN, [facility] [employer] [facility]    Referral received from  to follow for home care services. [facility] unable to staff timely, will require alternate agency, no preference, CM aware. Referral accepted by Neha Woods at [facility], will pull from [facility].      Ck Ricketts RN, BSN CTN  [facility] [phone]
Discharge Planning:    Per chart review, therapy recommending home healthcare for OT. Patient also interested in having a nurse come to her home to help with wound care/management. Referral placed and accepted with Reyna. Plan remains for patient to discharge to her home with family support and [facility]. Family to transport. Will continue to monitor for updates.     Electronically signed by Milagro Hester RN on 8/3/2022 at 3:03 PM
Pt declines to be seen for wound care. States that she needs to eat and is afraid her sugar will drop. She was treats at home with soap and water and dressing changes. If needed, she will shower if drainage becomes too much. Per chart review; she has completed chemo and only has 3 radiation treatments left. Will attempt to see pt later.  Catrina Quispe, MSN, RN, [facility] [employer] [facility]
Wound care aware of consult. Familiar with pt. Pt has known vulvar cancer. Treats at home with soap and water and dressing changes. If needed, she will shower if drainage becomes too much. Spoke with ED RN. Pt has drainage and open area to buttocks. Would cleanse with saline and apply hydragaurd silicone cream (blue tube). Will plan to see pt later.  Nadyne Canavan, MSN, RN, Cora Guillen
interested in having wound care at home once discharged, patient interested in referral placed to [facility]); referral placed.      Electronically signed by Milagro Hester RN on 8/2/2022 at 1:32 PM

## 2022-08-03 NOTE — PROGRESS NOTES
Comprehensive Nutrition Assessment    Type and Reason for Visit:  Initial    Nutrition Recommendations/Plan:   Continue regular diet  Glucerna BID (vanilla)     Malnutrition Assessment:  Malnutrition Status:  No malnutrition (08/03/22 1123)      Nutrition Assessment:    Wound, MST2. Stg 3 pressure injury buttocks. Pt admitted for hypoglycemia. Hx DM, HLD cancer. Pt wt is trending up. Pt was recently on chemo. Pt stated poor intake 2 weeks PTA due to chemo treatment and loss of appetite. Current PO intake 76 - 100%. ONS added to promote healing. Nutrition Related Findings:    BM 8/3, K 3 Wound Type: Stage III, Pressure Injury (buttocks)       Current Nutrition Intake & Therapies:    Average Meal Intake: %  Average Supplements Intake: None Ordered  ADULT DIET; Regular    Anthropometric Measures:  Height: 5' 1\" (154.9 cm)  Ideal Body Weight (IBW): 105 lbs (48 kg)    Admission Body Weight: 145 lb (65.8 kg)  Current Body Weight: 148 lb (67.1 kg), 141 % IBW. Weight Source: Standing Scale  Current BMI (kg/m2): 28  Weight Adjustment For: No Adjustment  BMI Categories: Overweight (BMI 25.0-29. 9)    Estimated Daily Nutrient Needs:  Energy Requirements Based On: Kcal/kg  Weight Used for Energy Requirements: Current  Energy (kcal/day): 1340 - 1675 kcal/day (20 - 25 kcal/kg ABW)  Weight Used for Protein Requirements: Ideal  Protein (g/day): 62 - 72 g/day (1.3 - 1.5 g/kg IBW)  Method Used for Fluid Requirements: 1 ml/kcal  Fluid (ml/day):      Nutrition Diagnosis:   Inadequate protein intake related to catabolic illness as evidenced by poor intake prior to admission, wounds    Nutrition Interventions:   Food and/or Nutrient Delivery: Continue Current Diet, Start Oral Nutrition Supplement  Nutrition Education/Counseling: No recommendation at this time  Coordination of Nutrition Care: Continue to monitor while inpatient     Goals:     Goals: Meet at least 75% of estimated needs     Nutrition Monitoring and Evaluation: Behavioral-Environmental Outcomes: None Identified  Food/Nutrient Intake Outcomes: Food and Nutrient Intake, Supplement Intake  Physical Signs/Symptoms Outcomes: Biochemical Data, Nutrition Focused Physical Findings, Skin, Weight    Discharge Planning:     Too soon to determine     [address]: [phone]

## 2022-08-03 NOTE — PLAN OF CARE
Problem: Discharge Planning  Goal: Discharge to home or other facility with appropriate resources  8/3/2022 1623 by Jeanne Nichols RN  Outcome: Progressing  8/3/2022 0409 by Gama Chan RN  Outcome: Progressing     Problem: Pain  Goal: Verbalizes/displays adequate comfort level or baseline comfort level  8/3/2022 1623 by Jeanne Nichols RN  Outcome: Progressing  8/3/2022 0409 by Gama Chan RN  Outcome: Progressing     Problem: Skin/Tissue Integrity  Goal: Absence of new skin breakdown  Description: 1. Monitor for areas of redness and/or skin breakdown  2. Assess vascular access sites hourly  3. Every 4-6 hours minimum:  Change oxygen saturation probe site  4. Every 4-6 hours:  If on nasal continuous positive airway pressure, respiratory therapy assess nares and determine need for appliance change or resting period.   8/3/2022 1623 by Jeanne Nichols RN  Outcome: Progressing  8/3/2022 0409 by Gama Chan RN  Outcome: Progressing     Problem: Safety - Adult  Goal: Free from fall injury  8/3/2022 1623 by Jeanne Nichols RN  Outcome: Progressing  8/3/2022 0409 by Gama Chan RN  Outcome: Aminata Santillan (Taken 8/2/2022 1634 by Rufus Lin RN)  Free From Fall Injury:   Instruct family/caregiver on patient safety   Based on caregiver fall risk screen, instruct family/caregiver to ask for assistance with transferring infant if caregiver noted to have fall risk factors     Problem: Chronic Conditions and Co-morbidities  Goal: Patient's chronic conditions and co-morbidity symptoms are monitored and maintained or improved  8/3/2022 0409 by Gama Chan RN  Outcome: Progressing     Problem: Nutrition Deficit:  Goal: Optimize nutritional status  8/3/2022 1623 by Jeanne Nichols RN  Outcome: Progressing  8/3/2022 1153 by Erin Oleary, NALDO, LD  Outcome: Progressing

## 2022-08-03 NOTE — PROGRESS NOTES
Discharge instructions and upcoming appointments discussed with patient and sister at bedside. Patient verbalized understanding of discharge instructions. IV's removed per protocol, with no complications noted. Patient escorted off of unit by PCA to discharge bridge, transported home via car by sister. VSS, patient in stable condition.

## 2022-08-03 NOTE — PROGRESS NOTES
Occupational Therapy  Facility/Department: [address] PROGRESSIVE CARE  Occupational Therapy Initial Assessment    Name: Chan Carrion  : 1955  MRN: [id number]  Date of Service: 8/3/2022    Discharge Recommendations:  24 hour supervision or assist, Home with Home health OT, S Level 1      Chan Carrion scored a 17/24 on the  [address] form. Current research shows that an AM-PAC score of 18 or greater is typically associated with a discharge to the patient's home setting. Based on the patient's AM-PAC score, and their current ADL deficits, it is recommended that the patient have 2-3 sessions per week of Occupational Therapy at d/c to increase the patient's independence. At this time, this patient demonstrates the endurance and safety to discharge home with  (home services) and a follow up treatment frequency of 2-3x/wk. Please see assessment section for further patient specific details. If patient discharges prior to next session this note will serve as a discharge summary. Please see below for the latest assessment towards goals. Patient Diagnosis(es): The primary encounter diagnosis was Hypoglycemia. A diagnosis of Hypothermia, initial encounter was also pertinent to this visit. Past Medical History:  has a past medical history of Congenital prolapsed rectum, Displaced bladder, Enlarged, heterogeneous thyroid, General weakness, Hernia, Mixed hyperlipidemia, Septic shock ([address] Severe malnutrition ([address] Type 2 diabetes mellitus without complication ([address] Varicose vein of leg, vulvar cancer, and Vulvar mass. Past Surgical History:  has a past surgical history that includes [address] (N/A, 2022). Assessment   Performance deficits / Impairments: Decreased functional mobility ; Decreased strength;Decreased endurance;Decreased ADL status; Decreased high-level IADLs  Assessment: Pt is a 76yr old female functioning below baseline due to deficits listed above.  Pt required max encouragement to engage in OT evaluation due to ongoing pain and discomfort in vaginal region. Pt grossly required CGA/SBA with RW. Anticipate pt would benefit from ongoing OT in order to increase functional status prior to returning home. Prognosis: Fair  Decision Making: Medium Complexity  REQUIRES OT FOLLOW-UP: Yes  Activity Tolerance  Activity Tolerance: Patient limited by pain  Activity Tolerance Comments: Pt required max encouragement to engage in therapy due to pain        Plan   Plan  Times per Week: 2-3  Current Treatment Recommendations: Strengthening, Balance training, Functional mobility training, Endurance training, Patient/Caregiver education & training, Pain management, Self-Care / ADL, Positioning     Restrictions  Restrictions/Precautions  Restrictions/Precautions: Fall Risk, Contact Precautions  Position Activity Restriction  Other position/activity restrictions: MDRO and ESBL in urine; RUE port    Subjective   General  Chart Reviewed: Yes, Orders, Progress Notes, History and Physical  Patient assessed for rehabilitation services?: Yes  Additional Pertinent Hx: Vulvar cancer  Diagnosis: Hypoglycemia  Subjective  Subjective: Pt sitting on side couch, minimally agreeable to OT. Pt stating \" do I have to? \" and offering many complaints about temp in room/ cold drink. OT offered support and assistance, but pt declined.   General Comment  Comments: RN opal for therapy     Social/Functional History  Social/Functional History  Lives With: Spouse  Type of Home: House  Home Layout: One level  Home Access: Stairs to enter without rails  Entrance Stairs - Number of Steps: 1 small step to enter  Bathroom Shower/Tub: Tub/Shower unit  Bathroom Toilet: Handicap height  Home Equipment: [place] [employer], rolling  Has the patient had two or more falls in the past year or any fall with injury in the past year?: Yes (2-3 falls before she knew she had cancer)  ADL Assistance: Independent  Homemaking Assistance: Independent  Ambulation Assistance: Independent (with RW)  Transfer Assistance: Independent  Active : No  Additional Comments:  home all the time but is currently ill; sister and daughter available to help at home       Objective   Heart Rate: (!) 107  Heart Rate Source: Telemetry  BP: (!) 117/59  BP Location: Left upper arm  BP Method: Automatic  Patient Position: Sitting;Up in chair  MAP (Calculated): 78.33  Resp: 18  SpO2: 95 %  O2 Device: None (Room air)             Safety Devices  Type of Devices: Call light within reach; Patient at risk for falls; All fall risk precautions in place; Left in chair  Transfer Training  Transfer Training: Yes  Overall Level of Assistance: Stand-by assistance  Sit to Stand: Stand-by assistance  Stand to Sit: Stand-by assistance  Bed to Chair: Stand-by assistance;Assist X1;Adaptive equipment (with RW)  Gait  Overall Level of Assistance: Assist X1;Contact-guard assistance; Additional time (significant increase in time)     AROM: Within functional limits  Strength: Generally decreased, functional  Coordination: Within functional limits  Tone: Normal  Sensation: Intact  ADL  Feeding: Independent  Additional Comments: Pt declined all ADLs; When OT offered suggestions to compensatory strategies due to pain (AE, reach, sock aid); pt reported she anticipates no issue. Bed mobility  Bed Mobility Comments: Pt in chair at start and end of session     Vision  Vision: Impaired  Vision Exceptions: Wears glasses at all times  Hearing  Hearing: Within functional limits                     Education Given To: Patient; Family  Education Provided: Role of Therapy;Plan of Care;ADL Adaptive Strategies;Transfer Training;Energy Conservation; Fall Prevention Strategies  Education Provided Comments: benefits of TTB; use of AE with LB dressing  Education Method: Verbal  Barriers to Learning: None  Education Outcome: Verbalized understanding;Continued education needed                AM-PAC Score        AM-[facility] Inpatient Daily Activity Raw Score: 17 (08/03/22 1330)  AM-PAC Inpatient ADL T-Scale Score : 37.26 (08/03/22 1330)  ADL Inpatient CMS 0-100% Score: 50.11 (08/03/22 1330)  ADL Inpatient CMS G-Code Modifier : CK (08/03/22 1330)    Tinneti Score       Goals  Short Term Goals  Time Frame for Short term goals: By d/c-goals ongoing  Short Term Goal 1: Pt will complete LB dressing with MIN A using AE as needed  Short Term Goal 2: Pt will complete toileting with SBA  Short Term Goal 3: Pt will complete functional transfers with SBA  Short Term Goal 4: Pt will increase standing tolerance to 4 mins to complete sink level ADLs with SBA       Therapy Time   Individual Concurrent Group Co-treatment   Time In 0908         Time Out 0947         Minutes 39         Timed Code Treatment Minutes: 24 Minutes       Alissa Potter OTR/L

## 2022-08-03 NOTE — PROGRESS NOTES
lymph nodes. Allergic/Immunologic: No nasal congestion or hives. Objective:     Patient Vitals for the past 8 hrs:   BP Temp Temp src Pulse Resp SpO2 Weight   08/03/22 0541 120/68 97.8 °F (36.6 °C) Oral (!) 107 18 96 % 148 lb 9.4 oz (67.4 kg)   08/03/22 0014 -- -- -- -- 15 -- --   08/02/22 2355 (!) 115/53 98.1 °F (36.7 °C) Oral 83 22 93 % --     I/O last 3 completed shifts: In: 5 [P.O.:120; I.V.:200; IV Piggyback:100]  Out: 900 [Urine:900]  No intake/output data recorded. /68   Pulse (!) 107   Temp 97.8 °F (36.6 °C) (Oral)   Resp 18   Ht 5' 1\" (1.549 m)   Wt 148 lb 9.4 oz (67.4 kg)   SpO2 96%   BMI 28.08 kg/m²     General Appearance:    Alert, cooperative, no distress, appears stated age   Head:    Normocephalic, without obvious abnormality, atraumatic   Eyes:    PERRL, conjunctiva/corneas clear, EOM's intact, fundi     benign, both eyes        Ears:    Normal TM's and external ear canals, both ears   Nose:   Nares normal, septum midline, mucosa normal, no drainage    or sinus tenderness   Throat:   Lips, mucosa, and tongue normal; teeth and gums normal   Neck:   Supple, symmetrical, trachea midline, no adenopathy;        thyroid:  No enlargement/tenderness/nodules; no carotid    bruit or JVD   Back:     Symmetric, no curvature, ROM normal, no CVA tenderness   Lungs:     Clear to auscultation bilaterally, respirations unlabored   Chest wall:    No tenderness or deformity   Heart:    Regular rate and rhythm, S1 and S2 normal, no murmur, rub   or gallop   Abdomen:     Soft, non-tender, bowel sounds active all four quadrants,     no masses, no organomegaly           Extremities:   Extremities normal, atraumatic, no cyanosis or edema   Pulses:   2+ and symmetric all extremities   Skin:   Skin color, texture, turgor normal, no rashes or lesions   Lymph nodes:   Cervical, supraclavicular, and axillary nodes normal   Neurologic:   CNII-XII intact.  Normal strength, sensation and reflexes throughout       Data ReviewCBC:   Lab Results   Component Value Date/Time    WBC 8.6 08/03/2022 05:50 AM    RBC 2.72 08/03/2022 05:50 AM       Assessment:     Principal Problem:    Hypoglycemia  Active Problems:    Generalized weakness    Electrolyte abnormality    DMII (diabetes mellitus, type 2) ([facility])  Resolved Problems:    * No resolved hospital problems. *      Plan:     Vulvar cancer. She is going to forego her last 3 radiation treatments. This will obviously take time to heal.  Pain control. I will send in refills on her home pain medicines. She states that she is running low. Hypoglycemia\diabetes.   I will defer to the primary team.    OK to discharge from my standpoint      Electronically signed by Antonio Broussard MD on 8/3/2022 at 7:41 AM

## 2022-08-03 NOTE — PLAN OF CARE
Problem: Discharge Planning  Goal: Discharge to home or other facility with appropriate resources  Outcome: Progressing     Problem: Pain  Goal: Verbalizes/displays adequate comfort level or baseline comfort level  Outcome: Progressing     Problem: Skin/Tissue Integrity  Goal: Absence of new skin breakdown  Description: 1. Monitor for areas of redness and/or skin breakdown  2. Assess vascular access sites hourly  3. Every 4-6 hours minimum:  Change oxygen saturation probe site  4. Every 4-6 hours:  If on nasal continuous positive airway pressure, respiratory therapy assess nares and determine need for appliance change or resting period.   Outcome: Progressing     Problem: Safety - Adult  Goal: Free from fall injury  Outcome: Progressing  Flowsheets (Taken 8/2/2022 1634 by Cynthia Gambino RN)  Free From Fall Injury:   Instruct family/caregiver on patient safety   Based on caregiver fall risk screen, instruct family/caregiver to ask for assistance with transferring infant if caregiver noted to have fall risk factors     Problem: Chronic Conditions and Co-morbidities  Goal: Patient's chronic conditions and co-morbidity symptoms are monitored and maintained or improved  Outcome: Progressing

## 2022-08-04 NOTE — CARE COORDINATION
[address] Transitions Initial Follow Up Call    Call within 2 business days of discharge: Yes    Patient: Mahsa Raines Patient : 1955   MRN: [id number]  Reason for Admission: hypoglycemia  Discharge Date: 8/3/22 RARS: Readmission Risk Score: 18.5      Last Discharge [facility]       Date Complaint Diagnosis Description Type Department Provider    22 Hypoglycemia; Fall; Other Hypoglycemia . .. ED to Hosp-Admission (Discharged) (ADMITTED) Tracy Mauricio MD; Aric Awad Leg. .. Spoke with: [address] [place] [address] Avenue: [facility]    Non-face-to-face services provided:  Obtained and reviewed discharge summary and/or continuity of care documents  Education of patient/family/caregiver/guardian to support self-management-glucose, diet, f/u  Assessment and support for treatment adherence and medication management-full medication reconciliation and 1111f completed    Care Transitions 24 Hour Call    Do you have a copy of your discharge instructions?: Yes  Do you have all of your prescriptions and are they filled?: Yes  Have you been contacted by a [facility] Pharmacist?: No  Have you scheduled your follow up appointment?: No  Do you have support at home?: Partner/Spouse/SO  Do you feel like you have everything you need to keep you well at home?: Yes  Are you an active caregiver in your home?: No  Care Transitions Interventions         Transitions of Care Initial Call    Was this an external facility discharge? No Discharge Facility: NA    Challenges to be reviewed by the provider   Additional needs identified to be addressed with provider: No  none             Method of communication with provider : none    Advance Care Planning:   Does patient have an Advance Directive: reviewed and current. N Care Coordinator contacted the patient by telephone to perform post hospital discharge assessment. Verified name and  with patient as identifiers.  Provided introduction to self, and explanation of the LPN CC role. LPN CC reviewed discharge instructions, medical action plan and red flags with patient who verbalized understanding. Patient given an opportunity to ask questions and does not have any further questions or concerns at this time. Were discharge instructions available to patient? Yes. Reviewed appropriate site of care based on symptoms and resources available to patient including: PCP  Specialist  Home health. The patient agrees to contact the PCP office for questions related to their healthcare. Medication reconciliation was performed with patient, who verbalizes understanding of administration of home medications. Advised obtaining a 90-day supply of all daily and as-needed medications. Was patient discharged with a pulse oximeter? no    Patient verified  and was pleasant and agreeable to transition calls. States she is glad to be home. Still pretty weak. Denies dizziness, LOC, NV/D. Reports some swelling to BLE. LPN CC encouraged patient to elevate BLE & speak with PCP if sx worsen or do not improve. States glucose is ok, no values given. Appetite diminished, has some Glucerna. Has good family support. Full medication reconciliation and 1111f completed. Waiting for office to call back for f/u. Denies needs. LPN CC provided contact information. Plan for follow-up call in 5-7 days based on severity of symptoms and risk factors. Plan for next call: symptom management-s/s hypoglycemia, swelling to BLE  self management-glucose, ADLs, appetite, elimination  follow up appointment-PCP? [facility]?   medication management-new or changed  community resources-[facility]       Follow Up  Future Appointments   Date Time Provider Delia Schmitz   10/17/2022 10:00 AM  [address]

## 2022-08-05 NOTE — DISCHARGE SUMMARY
Hospital Medicine Discharge Summary      Patient ID: Greg Lemos , [id number]     Patient's PCP: HERIBERTO Small - CNP    Admit Date: 8/1/2022     Discharge Date: 8/3/2022      Admitting Physician: Sylvester Duvall DO    Discharge Physician: Srikanth Mathur MD     Discharge Diagnoses: Active Hospital Problems    Diagnosis Date Noted    Electrolyte abnormality [E87.8] 08/01/2022     Priority: Medium    Hypoglycemia [E16.2] 08/01/2022     Priority: Medium    Generalized weakness [R53.1] 05/29/2022     Priority: Medium    DMII (diabetes mellitus, type 2) ([address] [E11.9] 01/25/2016         The patient was seen and examined on the day of discharge and this discharge summary is in conjunction with any daily progress note from day of discharge.     HOSPITAL COURSE    Patient demographics:  The patient  Greg Lemos is a 79 y.o. female      Significant past medical history:       Patient Active Problem List   Diagnosis    DMII (diabetes mellitus, type 2) (HCC)    Duodenal ulcer    Pain in both lower extremities    Varicosities of leg    Elevated blood pressure reading    Mixed hyperlipidemia    Type 2 diabetes mellitus with diabetic neuropathy    Acute cystitis with hematuria    Severe sepsis (HCC)    Septic shock (HCC)    Tachycardia    Neutrophilic leukocytosis    Elevated lactic acid level    Elevated liver function tests    Generalized weakness    Fatigue    Vulvar mass    Enlarged, heterogeneous thyroid    Severe malnutrition (HCC)    Electrolyte abnormality    Hypoglycemia            Presenting symptoms:  Syncope, generalized weakness, fatigue, lightheadedness, nausea, decreased appetite     Diagnostic workup:   CT HEAD WO CONTRAST       CONSULTS DURING ADMISSION :   IP CONSULT TO ONCOLOGY  IP CONSULT TO DIETITIAN        Patient was diagnosed with:  Syncope  Hypoglycemia  Electrolyte abnormality  Generalized weakness  Leg swelling  Type 2 diabetes  Malignancy        Treatment while these medications      gabapentin 300 MG capsule  Commonly known as: NEURONTIN  FILL 11/3 1 tab in am 1 tab afternoon 1 tab at dinner 3 tabs at bedtime  What changed:   how much to take  how to take this  when to take this  additional instructions            CONTINUE taking these medications      insulin aspart 100 UNIT/ML injection vial  Commonly known as: NOVOLOG     insulin glargine 100 UNIT/ML injection vial  Commonly known as: LANTUS     rosuvastatin 5 MG tablet  Commonly known as: CRESTOR  TAKE ONE TABLET BY MOUTH ONCE NIGHTLY               Where to Get Your Medications        These medications were sent to [place] [id number] [address] -  [phone]  [address] [address]      Phone: [phone]   ciprofloxacin 500 MG tablet     Pharmacy Instructions:    [facility]. [address] [place] [address] [address] [phone]              Time Spent on discharge is more than 30 min in the examination, evaluation, counseling and review of medications and discharge plan. Signed:  Raul Culp MD   8/4/2022      Thank you HERIBERTO Pineda CNP for the opportunity to be involved in this patient's care. If you have any questions or concerns please feel free to contact me at [phone]. This note was transcribed using [id number] [employer]. Please disregard any translational errors.

## 2022-08-07 PROBLEM — R29.6 FREQUENT FALLS: Status: ACTIVE | Noted: 2022-01-01

## 2022-08-07 PROBLEM — J90 PLEURAL EFFUSION, RIGHT: Status: ACTIVE | Noted: 2022-01-01

## 2022-08-07 PROBLEM — R09.02 HYPOXIA: Status: ACTIVE | Noted: 2022-01-01

## 2022-08-07 PROBLEM — M79.89 LEG SWELLING: Status: ACTIVE | Noted: 2022-01-01

## 2022-08-07 PROBLEM — R62.7 FAILURE TO THRIVE IN ADULT: Status: ACTIVE | Noted: 2022-01-01

## 2022-08-07 PROBLEM — C51.9 VULVAR CANCER (HCC): Status: ACTIVE | Noted: 2022-01-01

## 2022-08-07 PROBLEM — D69.6 THROMBOCYTOPENIA (HCC): Status: ACTIVE | Noted: 2022-01-01

## 2022-08-07 PROBLEM — D64.9 ANEMIA: Status: ACTIVE | Noted: 2022-01-01

## 2022-08-07 PROBLEM — G89.3 CANCER RELATED PAIN: Status: ACTIVE | Noted: 2022-01-01

## 2022-08-07 PROBLEM — L58.0: Status: ACTIVE | Noted: 2022-01-01

## 2022-08-08 PROBLEM — Z79.899 IMMUNODEFICIENCY DUE TO CHEMOTHERAPY (HCC): Status: ACTIVE | Noted: 2022-01-01

## 2022-08-08 PROBLEM — L03.116 BILATERAL LOWER LEG CELLULITIS: Status: ACTIVE | Noted: 2022-01-01

## 2022-08-08 PROBLEM — E66.3 OVERWEIGHT (BMI 25.0-29.9): Status: ACTIVE | Noted: 2022-01-01

## 2022-08-08 PROBLEM — T45.1X5A IMMUNODEFICIENCY DUE TO CHEMOTHERAPY (HCC): Status: ACTIVE | Noted: 2022-01-01

## 2022-08-08 PROBLEM — E46 MALNUTRITION (HCC): Status: ACTIVE | Noted: 2022-01-01

## 2022-08-08 PROBLEM — L03.115 BILATERAL LOWER LEG CELLULITIS: Status: ACTIVE | Noted: 2022-01-01

## 2022-08-08 PROBLEM — D84.821 IMMUNODEFICIENCY DUE TO CHEMOTHERAPY (HCC): Status: ACTIVE | Noted: 2022-01-01

## 2022-08-08 PROBLEM — R53.1 GENERAL WEAKNESS: Status: ACTIVE | Noted: 2022-01-01

## 2022-08-08 PROBLEM — J91.0 MALIGNANT PLEURAL EFFUSION: Status: ACTIVE | Noted: 2022-01-01

## 2022-08-08 NOTE — PLAN OF CARE
Problem: Pain  Goal: Verbalizes/displays adequate comfort level or baseline comfort level  8/8/2022 1721 by Junior Jain RN  Outcome: Progressing  8/8/2022 0523 by Tremaine Figueroa RN  Outcome: Progressing  8/8/2022 0514 by Tremaine Figueroa RN  Outcome: Progressing  8/8/2022 0512 by Tremaine Figueroa RN  Outcome: Progressing     Problem: Safety - Adult  Goal: Free from fall injury  8/8/2022 1721 by Junior Jain RN  Outcome: Progressing  8/8/2022 0523 by Tremaine Figueroa RN  Outcome: Progressing  8/8/2022 0514 by Tremaine Figueroa RN  Outcome: Progressing     Problem: ABCDS Injury Assessment  Goal: Absence of physical injury  8/8/2022 1721 by Junior Jain RN  Outcome: Progressing  8/8/2022 0523 by Tremaine Figueroa RN  Outcome: Progressing     Problem: Skin/Tissue Integrity  Goal: Absence of new skin breakdown  Description: 1. Monitor for areas of redness and/or skin breakdown  2. Assess vascular access sites hourly  3. Every 4-6 hours minimum:  Change oxygen saturation probe site  4. Every 4-6 hours:  If on nasal continuous positive airway pressure, respiratory therapy assess nares and determine need for appliance change or resting period.   8/8/2022 1721 by Junior Jain RN  Outcome: Progressing  8/8/2022 0523 by Tremaine Figueroa RN  Outcome: Progressing     Problem: Chronic Conditions and Co-morbidities  Goal: Patient's chronic conditions and co-morbidity symptoms are monitored and maintained or improved  Outcome: Progressing  Flowsheets (Taken 8/8/2022 0859)  Care Plan - Patient's Chronic Conditions and Co-Morbidity Symptoms are Monitored and Maintained or Improved: Monitor and assess patient's chronic conditions and comorbid symptoms for stability, deterioration, or improvement

## 2022-08-08 NOTE — ED PROVIDER NOTES
[facility] Emergency Department    Delano Burnham MD, am the primary clinician of record. CHIEF COMPLAINT  Chief Complaint   Patient presents with    Fatigue     Pt arrived from home via [facility] w c/o of severe weakness d/t cancer chemo Tx (last one was on Thursday). Pt also complaints of multiple burns form chemo. HISTORY OF PRESENT ILLNESS  Tre Christina is a 79 y.o. female  who presents to the ED complaining of severe and generalized weakness. She has had multiple falls because of it. Symptoms have generally worsened since Thursday and she is on chemotherapy as well as previously on radiation therapy for vulvar cancer. She had to stop the radiation therapy because of significant excoriation and discomfort over her genital area. She has not had a fever that she is aware of. She has had some shortness of breath intermittently but not coughing or febrile. She has had to have iron infusions due to anemia during her chemotherapy. She has a documented history of severe malnutrition as well. She says that her legs are so swollen that she cannot really bend them or ambulate. He is hypoxic without baseline oxygen requirement on initial assessment and appears quite pale. She says that her wounds are excoriated and have been bleeding and causing her quite a bit of discomfort. No other complaints, modifying factors or associated symptoms. I have reviewed the following from the nursing documentation.     Past Medical History:   Diagnosis Date    Congenital prolapsed rectum     Displaced bladder     Enlarged, heterogeneous thyroid 05/29/2022    General weakness     Hernia     Mixed hyperlipidemia 10/14/2019    Septic shock ([address]     5/2022    Severe malnutrition ([address] 05/31/2022    Type 2 diabetes mellitus without complication ([address] 69/54/2125    Varicose vein of leg     vulvar cancer     vulva    Vulvar mass      Past Surgical History:   Procedure Laterality Date 2228    0.9 % sodium chloride infusion   IntraVENous PRN Moon Batres MD         Current Outpatient Medications   Medication Sig Dispense Refill    ciprofloxacin (CIPRO) 500 MG tablet Take 1 tablet by mouth in the morning and 1 tablet before bedtime. Do all this for 3 days. 6 tablet 0    insulin glargine (LANTUS) 100 UNIT/ML injection vial Inject 20 Units into the skin nightly      rosuvastatin (CRESTOR) 5 MG tablet TAKE ONE TABLET BY MOUTH ONCE NIGHTLY 90 tablet 1    insulin aspart (NOVOLOG) 100 UNIT/ML injection vial Inject 0-4 Units into the skin in the morning and 0-4 Units at noon and 0-4 Units in the evening. Inject before meals. Sliding scale on Blood sugars over 139. gabapentin (NEURONTIN) 300 MG capsule FILL 11/3 1 tab in am 1 tab afternoon 1 tab at dinner 3 tabs at bedtime 180 capsule 5     No Known Allergies    REVIEW OF SYSTEMS  10 systems reviewed, pertinent positives per HPI otherwise noted to be negative. PHYSICAL EXAM  BP (!) 115/55   Pulse (!) 103   Temp 98.5 °F (36.9 °C) (Oral)   Resp 23   Ht 5' 1\" (1.549 m)   Wt 148 lb (67.1 kg)   SpO2 (!) 81%   BMI 27.96 kg/m²    GENERAL APPEARANCE: Awake and alert. Cooperative. Mild distress. HENT: Normocephalic. Atraumatic. Mucous membranes are dry and tacky. NECK: Supple. EYES: PERRL. EOM's grossly intact. HEART/CHEST: Tachycardia, reg rhythm. No murmurs. No chest wall tenderness. LUNGS: Rhonchi throughout R>L, no wheezing. Diminished at bases R>L. ABDOMEN: Mild diffuse nonfocal lower tenderness. Soft. Mildly distended. No masses. No organomegaly. No guarding or rebound. Normal bowel sounds throughout. MUSCULOSKELETAL: 4+ bilat extremity edema. Compartments soft. No deformity. No tenderness in the extremities. All extremities neurovascularly intact. SKIN: Warm and dry. Notable sacral, lower abdominal, and vaginal/inguinal/mons pubis excoriation with redness and erythema and notable tenderness, no blistering. NEUROLOGICAL: Alert and oriented. CN's 2-12 intact. No gross facial drooping. Strength 5/5, sensation intact. 2 plus DTR's in knees bilaterally. Gait not assessed. PSYCHIATRIC: Normal mood and affect. LABS  I have reviewed all labs for this visit.    Results for orders placed or performed during the hospital encounter of 08/07/22   CBC with Auto Differential   Result Value Ref Range    WBC 7.8 4.0 - 11.0 K/uL    RBC 2.10 (L) 4.00 - 5.20 M/uL    Hemoglobin 6.0 (LL) 12.0 - 16.0 g/dL    Hematocrit 19.0 (LL) 36.0 - 48.0 %    MCV 90.2 80.0 - 100.0 fL    MCH 28.4 26.0 - 34.0 pg    MCHC 31.5 31.0 - 36.0 g/dL    RDW 27.6 (H) 12.4 - 15.4 %    Platelets 57 (L) 476 - 450 K/uL    MPV 8.7 5.0 - 10.5 fL    SLIDE REVIEW see below     Neutrophils % 87.9 %    Lymphocytes % 3.9 %    Monocytes % 7.4 %    Eosinophils % 0.5 %    Basophils % 0.3 %    Neutrophils Absolute 6.8 1.7 - 7.7 K/uL    Lymphocytes Absolute 0.3 (L) 1.0 - 5.1 K/uL    Monocytes Absolute 0.6 0.0 - 1.3 K/uL    Eosinophils Absolute 0.0 0.0 - 0.6 K/uL    Basophils Absolute 0.0 0.0 - 0.2 K/uL   Comprehensive Metabolic Panel w/ Reflex to MG   Result Value Ref Range    Sodium 138 136 - 145 mmol/L    Potassium reflex Magnesium 3.7 3.5 - 5.1 mmol/L    Chloride 100 99 - 110 mmol/L    CO2 28 21 - 32 mmol/L    Anion Gap 10 3 - 16    Glucose 131 (H) 70 - 99 mg/dL    BUN 24 (H) 7 - 20 mg/dL    Creatinine 0.8 0.6 - 1.2 mg/dL    GFR Non-African American >60 >60    GFR African American >60 >60    Calcium 7.1 (L) 8.3 - 10.6 mg/dL    Total Protein 4.6 (L) 6.4 - 8.2 g/dL    Albumin 2.3 (L) 3.4 - 5.0 g/dL    Albumin/Globulin Ratio 1.0 (L) 1.1 - 2.2    Total Bilirubin <0.2 0.0 - 1.0 mg/dL    Alkaline Phosphatase 84 40 - 129 U/L    ALT 20 10 - 40 U/L    AST 22 15 - 37 U/L   Lactate, Sepsis   Result Value Ref Range    Lactic Acid, Sepsis 1.2 0.4 - 1.9 mmol/L   Protime-INR   Result Value Ref Range    Protime 15.0 (H) 11.7 - 14.5 sec    INR 1.18 (H) 0.87 - 1.14   Troponin   Result Value Ref Range    Troponin 0.05 (H) <0.01 ng/mL   Brain Natriuretic Peptide   Result Value Ref Range    Pro- (H) 0 - 124 pg/mL   CK   Result Value Ref Range    Total  26 - 192 U/L   EKG 12 Lead   Result Value Ref Range    Ventricular Rate 85 BPM    Atrial Rate 85 BPM    P-R Interval 106 ms    QRS Duration 84 ms    Q-T Interval 396 ms    QTc Calculation (Bazett) 471 ms    P Axis 74 degrees    R Axis 32 degrees    T Axis 14 degrees    Diagnosis Sinus rhythm with short PROtherwise normal ECG    TYPE AND SCREEN   Result Value Ref Range    ABO/Rh A NEG     Antibody Screen NEG    PREPARE RBC (CROSSMATCH), 1 Units   Result Value Ref Range    Product Code Blood Bank [id number]     Description Blood Bank Red Blood Cells, Leuko-reduced     Unit Number [id number]     Dispense Status Blood Bank selected      The 12 lead EKG was interpreted by me as follows:  Rate: bradycardia with a rate of 85  Rhythm: sinus  Axis: normal  Intervals: short ME, narrow QRS, normal QTc  ST segments: no ST elevations or depressions  T waves: no abnormal inversions  Non-specific T wave changes: not present  Prior EKG comparison: EKG dated 8/1/22 is not significantly different    RADIOLOGY    CT HEAD WO CONTRAST    Result Date: 8/1/2022  EXAMINATION: CT OF THE HEAD WITHOUT CONTRAST  8/1/2022 3:24 am TECHNIQUE: CT of the head was performed without the administration of intravenous contrast. Automated exposure control, iterative reconstruction, and/or weight based adjustment of the mA/kV was utilized to reduce the radiation dose to as low as reasonably achievable. COMPARISON: CT head without contrast May 29, 2022. HISTORY: ORDERING SYSTEM PROVIDED HISTORY: Head trauma TECHNOLOGIST PROVIDED HISTORY: Has a \"code stroke\" or \"stroke alert\" been called? ->No Reason for exam:->Head trauma Decision Support Exception - unselect if not a suspected or confirmed emergency medical condition->Emergency Medical Condition (MA) Reason for Exam: Head trauma FINDINGS: BRAIN/VENTRICLES: There is no acute intracranial hemorrhage, mass effect or midline shift. No abnormal extra-axial fluid collection. The gray-white differentiation is maintained without evidence of an acute infarct. There is no evidence of hydrocephalus. ORBITS: The visualized portion of the orbits demonstrate no acute abnormality. SINUSES: The visualized paranasal sinuses and mastoid air cells demonstrate no acute abnormality. SOFT TISSUES/SKULL:  No acute abnormality of the visualized skull or soft tissues. No acute intracranial abnormality. CT ABDOMEN PELVIS W IV CONTRAST Additional Contrast? None    Result Date: 8/7/2022  EXAMINATION: CT OF THE ABDOMEN AND PELVIS WITH CONTRAST 8/7/2022 9:16 pm TECHNIQUE: CT of the abdomen and pelvis was performed with the administration of intravenous contrast. Multiplanar reformatted images are provided for review. Automated exposure control, iterative reconstruction, and/or weight based adjustment of the mA/kV was utilized to reduce the radiation dose to as low as reasonably achievable. COMPARISON: 05/29/2022 HISTORY: ORDERING SYSTEM PROVIDED HISTORY: h/o vulvar CA, sacral and lower abd cellulitic changes TECHNOLOGIST PROVIDED HISTORY: Additional Contrast?->None Reason for exam:->h/o vulvar CA, sacral and lower abd cellulitic changes Decision Support Exception - unselect if not a suspected or confirmed emergency medical condition->Emergency Medical Condition (MA) Reason for Exam: Fatigue (Pt arrived from home via [facility] w c/o of severe weakness d/t cancer chemo Tx (last one was on Thursday). Pt also complaints of multiple burns form chemo.) FINDINGS: Lower Chest: There is a moderate sized, partially loculated right pleural effusion, new from the previous exam.  There is volume loss within the right lower lobe. There is focal atelectasis at the periphery of the left lung base. Organs:  The liver, spleen, pancreas, gallbladder and adrenal glands are normal in appearance. The kidneys are normal in appearance. GI/Bowel: There is a fat-containing umbilical hernia, similar in appearance compared to the previous exam.  Mild inflammatory stranding within the hernia persists. There are no abnormally dilated loops of large or small bowel present. There is no mesenteric inflammatory stranding present. Pelvis: There is a large necrotic exophytic mass arising from the right vulva consistent with the patient's vulvar malignancy. This appears slightly smaller compared with the previous exam, currently measuring approximately 10.4 x 7.8 cm (previously 14.1 x 8.8 cm). Superficial soft tissue gas foci are noted suggesting ulceration. There is no deep or fascial extent of soft tissue gas. Peritoneum/Retroperitoneum: There is no pathologically enlarged lymphadenopathy present. Bones/Soft Tissues: Anasarca is noted. No lytic or blastic osseous lesions are identified. 1. Redemonstration of a large exophytic mass arising from the right vulva consistent with the patient's known malignancy. Given the irregular exophytic appearance of the lesion, there may be secondary ulceration and infection. 2. New moderate-sized loculated right pleural effusion concerning for a malignant effusion. 3. Interval development of anasarca. XR CHEST PORTABLE    Result Date: 8/7/2022  EXAMINATION: ONE X-RAY VIEW OF THE CHEST 8/7/2022 7:38 pm COMPARISON: 08/01/2022 HISTORY: ORDERING SYSTEM PROVIDED HISTORY: port, gen weakness TECHNOLOGIST PROVIDED HISTORY: Reason for exam:->port, gen weakness FINDINGS: Cardiomegaly is unchanged. There is a moderate right pleural effusion. There is abnormal airspace consolidation within the right lower lobe. The left lung is clear. A left chest Port-A-Cath is present. Moderate right pleural effusion and consolidation of the right lower lobe concerning for a right lower lobe pneumonia.      XR CHEST PORTABLE    Result Date: 8/1/2022  EXAMINATION: ONE XRAY VIEW OF THE CHEST 8/1/2022 2:50 am COMPARISON: 06/08/2022 HISTORY: ORDERING SYSTEM PROVIDED HISTORY: SOB TECHNOLOGIST PROVIDED HISTORY: Reason for exam:->SOB Reason for Exam: hypoglycemia FINDINGS: Implanted chest port in stable position. Cardiac mediastinal silhouettes appear within normal limits for size. Pulmonary vascularity appears normal. No focal infiltrate or pleural effusion. No pneumothorax. No acute osseous abnormality. Degenerative changes are seen in the shoulders and spine. Clear chest without acute cardiopulmonary process. ED COURSE/MDM  Patient seen and evaluated. Old records reviewed. Labs and imaging reviewed and results discussed with patient. The patient's ED workup was notable for multiple ongoing comorbidities and active medical issues. Firstly she was hypoxic with what appears to be malignant pleural effusion, possibly pneumonia, although stable on nasal cannula oxygen. She was covered with broad-spectrum antibiotics. This should also cover for any cellulitic component of her significantly excoriated lower abdominal, inguinal, vaginal and sacral wounds. CT abdomen and pelvis did not show any underlying bony process or any other acute abnormalities in the belly. She does have new finding of ascites as well as anasarca which is consistent with her profound peripheral edema which is part of the debilitating factor for her causing her to fall. No injuries identified in her work-up today. She also has anemia with a hemoglobin of 6 which is lower than her baseline of around 8 which could be a contributing factor from her chemotherapy as well as her excoriated wounds and bleeding. She will be transfused packed red blood cells as well as the broad-spectrum antibiotics. She may benefit from inpatient consults such as heme/onc, gyn, pulmonology, or wound care per inpt team discretion. Is this patient to be included in the SEP-1 Core Measure?   No   Exclusion criteria - the patient is NOT to be included for SEP-1 Core Measure due to: Alternative explanation for abnormal labs/vitals that do not relate to sepsis, see MDM for further explanation  No end organ dysfunction noted    During the patient's ED course, the patient was given:  Medications   morphine injection 4 mg (4 mg IntraVENous Given 8/7/22 2018)   ondansetron (ZOFRAN) injection 4 mg (4 mg IntraVENous Given 8/7/22 2018)   vancomycin 1000 mg IVPB in 250 mL D5W addavial (has no administration in time range)   cefepime (MAXIPIME) 2000 mg IVPB minibag (2,000 mg IntraVENous New Bag 8/7/22 2228)   0.9 % sodium chloride infusion (has no administration in time range)   iopamidol (ISOVUE-370) 76 % injection 50 mL (50 mLs IntraVENous Given 8/7/22 2124)        CLINICAL IMPRESSION  1. Immunodeficiency due to chemotherapy ([facility]    2. Vulvar cancer ([facility]    3. Malignant pleural effusion    4. Anemia, unspecified type    5. Hypoxia        Blood pressure (!) 115/55, pulse (!) 103, temperature 98.5 °F (36.9 °C), temperature source Oral, resp. rate 23, height 5' 1\" (1.549 m), weight 148 lb (67.1 kg), SpO2 (!) 81 %, not currently breastfeeding. DISPOSITION  Yuri Lovelace was admitted in fair condition. The plan is to admit to the hospital at this time under the hospitalist service. Hospitalist accepted the patient and will take over the patient's care. The total critical care time I independently spent while evaluating and treating this patient was 40 minutes. This excludes time spent doing separately billable procedures. This includes time at the bedside, data interpretation, medication management, obtaining critical history from collateral sources if the patient is unable to provide it directly, and physician consultation. Specifics of interventions taken and potentially life-threatening diagnostic considerations are listed above in the medical decision making.   If this was a shared visit with an YELITZA, the time in

## 2022-08-08 NOTE — CONSULTS
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 Pt also complaints of multiple burns form chemo. DATE OF CONSULT: 8/7/2022    HISTORY OF PRESENT ILLNESS: 79y.o. year old female with a past medical history significant for vulvar cancer, hyperlipidemia, diabetes who presented to the hospital with for generalized weakness. Patient reports that she has not been having any shortness of breath or cough or discolored expectoration. She does feel weak and tired. Has undergone chemoradiation for her vulvar cancer. Has not tolerated it well. Continues to have pain in the perineal area. Also found to have very low hemoglobin level requiring PRBC transfusion. In the work-up right-sided pleural effusion was seen. Pulmonary has been consulted for evaluation. Patient denies any fevers, night sweats. At the time of interview patient lying in bed in no apparent respite distress. Appears weak, tired and exhausted. Also appears pale. Denies any shortness of breath, cough or chest pain. Does have pain in the perineal area. REVIEW OF SYSTEMS:     CONSTITUTIONAL SYMPTOMS: The patient denies fever, fatigue, night sweats, weight loss or weight gain. HEENT: No vision changes. No tinnitus, Denies sinus pain. No hoarseness, or dysphagia. NECK: Patient denies swelling in the neck. CARDIOVASCULAR: Denies chest pain, palpitation, syncope. RESPIRATORY: As per HPI.   GASTROINTESTINAL: Denies nausea, abdominal pain or change in bowel function. GENITOURINARY: Denies obstructive symptoms. Pain in the perineal area. BREASTS: No masses or lumps in the breasts. SKIN: No rashes or itching. MUSCULOSKELETAL: Denies weakness or bone pain. NEUROLOGICAL: No headaches or seizures. PSYCHIATRIC: Denies mood swings or depression. ENDOCRINE: Denies heat or cold intolerance or excessive thirst.  HEMATOLOGIC/LYMPHATIC: Denies easy bruising or lymph node swelling. ALLERGIC/IMMUNOLOGIC: No environmental allergies. PAST MEDICAL HISTORY:   Past Medical History:   Diagnosis Date    Congenital prolapsed rectum     Displaced bladder     Enlarged, heterogeneous thyroid 05/29/2022    General weakness     Hernia     Mixed hyperlipidemia 10/14/2019    Septic shock ([facility]     5/2022    Severe malnutrition ([facility] 05/31/2022    Type 2 diabetes mellitus without complication ([facility] 97/13/6890    Varicose vein of leg     vulvar cancer     vulva    Vulvar mass        PAST SURGICAL HISTORY:   Past Surgical History:   Procedure Laterality Date    PORT SURGERY N/A 6/8/2022    PORT A CATHETER INSERTION (NON ACCESSED) performed by Lexa Angulo MD at [address]:   Social History     Tobacco Use    Smoking status: Never    Smokeless tobacco: Never   Vaping Use    Vaping Use: Never used   Substance Use Topics    Alcohol use: No     Alcohol/week: 0.0 standard drinks    Drug use: No       FAMILY HISTORY:   Family History   Problem Relation Age of Onset    High Blood Pressure Mother     Hypertension Mother     High Cholesterol Mother     Macular Degen Mother     Thyroid Disease Mother     Cancer Father     Alcohol Abuse Father     High Blood Pressure Sister     High Cholesterol Sister     Thyroid Disease Sister     Heart Disease Brother     Alcohol Abuse Brother        MEDICATIONS:     No current facility-administered medications on file prior to encounter.      Current Outpatient Medications on File Prior to Encounter   Medication Sig Dispense Refill insulin glargine (LANTUS) 100 UNIT/ML injection vial Inject 20 Units into the skin nightly      rosuvastatin (CRESTOR) 5 MG tablet TAKE ONE TABLET BY MOUTH ONCE NIGHTLY 90 tablet 1    insulin aspart (NOVOLOG) 100 UNIT/ML injection vial Inject 0-4 Units into the skin in the morning and 0-4 Units at noon and 0-4 Units in the evening. Inject before meals. Sliding scale on Blood sugars over 139. gabapentin (NEURONTIN) 300 MG capsule FILL 11/3 1 tab in am 1 tab afternoon 1 tab at dinner 3 tabs at bedtime 180 capsule 5        silver sulfADIAZINE   Topical Daily    hydrocortisone   Topical 4x Daily    sodium chloride flush  5-40 mL IntraVENous 2 times per day    enoxaparin  40 mg SubCUTAneous Daily    gabapentin  300 mg Oral TID    insulin glargine  10 Units SubCUTAneous Nightly    rosuvastatin  5 mg Oral Nightly    insulin lispro  0-8 Units SubCUTAneous TID WC    insulin lispro  0-4 Units SubCUTAneous Nightly      sodium chloride      sodium chloride      sodium chloride 25 mL/hr at 08/08/22 0242    dextrose       morphine, potassium chloride **OR** potassium alternative oral replacement **OR** potassium chloride, magnesium sulfate, perflutren lipid microspheres, sodium chloride, sodium chloride, sodium chloride flush, sodium chloride, ondansetron **OR** ondansetron, polyethylene glycol, acetaminophen **OR** acetaminophen, dextrose bolus **OR** dextrose bolus, glucagon (rDNA), dextrose      ALLERGIES:   Allergies as of 08/07/2022    (No Known Allergies)      OBJECTIVE:   height is 5' 1\" (1.549 m) and weight is 148 lb (67.1 kg). Her axillary temperature is 98.7 °F (37.1 °C). Her blood pressure is 104/56 (abnormal) and her pulse is 91. Her respiration is 17 and oxygen saturation is 96%. No intake/output data recorded. PHYSICAL EXAM:    CONSTITUTIONAL: She is a 79y.o.-year-old who appears her stated age. She is alert and oriented x 3 and in no acute distress. Appears cachectic and tired. HEENT: JACOB BURCIAGA. No scleral icterus. No thrush, atraumatic, normocephalic. NECK: Supple, without cervical or supraclavicular lymphadenopathy:  CARDIOVASCULAR: S1 S2 RRR. Without murmer  RESPIRATORY & CHEST: Decreased breath sounds in the right basilar area. No wheezing or crackles heard. GASTROINTESTINAL & ABDOMEN: Soft, nontender, positive bowel sounds in all quadrants, non-distended, without hepatosplenomegaly. GENITOURINARY: Deferred. MUSCULOSKELETAL: No tenderness to palpation of the axial skeleton. There is no clubbing. No cyanosis. No edema of the lower extremities. SKIN OF BODY: No rash or jaundice. PSYCHIATRIC EVALUATION: Normal affect. Patient answers questions appropriately. HEMATOLOGIC/LYMPHATIC/ IMMUNOLOGIC: No palpable lymphadenopathy. NEUROLOGIC: Alert and oriented x 3. Groslly non-focal. Motor strength is 5+/5 in all muscle groups. The patient has a normal sensorium globally. LABS:  Recent Labs     08/07/22 2004 08/08/22 0333 08/08/22 0618   WBC 7.8  --  7.8   HGB 6.0* 7.2* 6.9*   HCT 19.0* 23.1* 21.6*   PLT 57*  --  53*   ALT 20  --  17   AST 22  --  18     --  135*   K 3.7  --  3.3*     --  99   CREATININE 0.8  --  0.6   BUN 24*  --  18   CO2 28  --  29   INR 1.18*  --  1.15*       Recent Labs     08/07/22 2004 08/08/22 0618   GLUCOSE 131* 117*   CALCIUM 7.1* 7.0*    135*   K 3.7 3.3*   CO2 28 29    99   BUN 24* 18   CREATININE 0.8 0.6       No results for input(s): PHART, ZNW2CNU, PO2ART, EGI2WIC, X6YEDKBT, BEART, X9ZFCVEQ in the last 72 hours.     Lab Results   Component Value Date    INR 1.15 (H) 08/08/2022    INR 1.18 (H) 08/07/2022    PROTIME 14.7 (H) 08/08/2022    PROTIME 15.0 (H) 08/07/2022     Lab Results   Component Value Date/Time    AMYLASE 42 11/19/2015 12:22 PM      Lab Results   Component Value Date    LABA1C 6.0 04/18/2022     Lab Results   Component Value Date    .8 11/19/2015     No results found for: TSH, F2QZVMA, E8YVNBR, THYROIDAB, FT3, T4FREE  Lab Results   Component Value Date    CKTOTAL 174 08/07/2022    TROPONINI 0.04 (H) 08/08/2022      No results found for: CRP   No results found for: BNP   Lab Results   Component Value Date    DDIMER <200 11/19/2015      Lab Results   Component Value Date    FERRITIN 209.2 (H) 05/31/2022      Lab Results   Component Value Date    LACTA 1.5 05/30/2022           IMAGING:    Narrative   EXAMINATION:   CT OF THE ABDOMEN AND PELVIS WITH CONTRAST 8/7/2022 9:16 pm           FINDINGS:   Lower Chest: There is a moderate sized, partially loculated right pleural   effusion, new from the previous exam.  There is volume loss within the right   lower lobe. There is focal atelectasis at the periphery of the left lung   base. Organs: The liver, spleen, pancreas, gallbladder and adrenal glands are   normal in appearance. The kidneys are normal in appearance. GI/Bowel: There is a fat-containing umbilical hernia, similar in appearance   compared to the previous exam.  Mild inflammatory stranding within the hernia   persists. There are no abnormally dilated loops of large or small bowel   present. There is no mesenteric inflammatory stranding present. Pelvis: There is a large necrotic exophytic mass arising from the right vulva   consistent with the patient's vulvar malignancy. This appears slightly   smaller compared with the previous exam, currently measuring approximately   10.4 x 7.8 cm (previously 14.1 x 8.8 cm). Superficial soft tissue gas foci   are noted suggesting ulceration. There is no deep or fascial extent of soft   tissue gas. Peritoneum/Retroperitoneum: There is no pathologically enlarged   lymphadenopathy present. Bones/Soft Tissues: Anasarca is noted. No lytic or blastic osseous lesions   are identified. Impression   1. Redemonstration of a large exophytic mass arising from the right vulva   consistent with the patient's known malignancy.   Given the irregular exophytic appearance of the lesion, there may be secondary ulceration and   infection. 2. New moderate-sized loculated right pleural effusion concerning for a   malignant effusion. 3. Interval development of anasarca. IMPRESSION:     Generalized weakness  Vulvar cancer  Right-sided pleural effusion  Symptomatic anemia  Failure to thrive  Hypoxic respiratory failure    RECOMMENDATION:     Patient has presented to the hospital with generalized weakness and during work-up was seen to have right sided pleural effusion. I performed a bedside thoracic ultrasound to assess the amount and nature of pleural effusion. A representative images as below:      Right pleural space. Diaphragm and liver below it is at 3 o'clock position. Lung edge is at 9 o'clock position. Intervening there is a large pleural effusion seen. There is a pleural-based mass also present (in the center of the picture)  Well patient is not reporting any respiratory symptoms, the pleural effusion is large and compressing the underlying lung field. I will plan to perform a bedside thoracentesis. I will send the pleural fluid for cytological, biochemical and microbiological testing. Patient is agreeable to this plan. During the interview patient voiced multiple times that she does not want any more therapy as it is causing her to become weaker and she is not able to tolerate it. I will get palliative medicine on board to discuss goals of care and CODE STATUS. Thank you for your consultation. We will continue to follow the patient. Kevin French MD  Pulmonary Critical Care and Sleep Medicine  8/7/2022, 1:30 PM    This note was completed using dragon medical speech recognition software. Grammatical errors, random word insertions, pronoun errors and incomplete sentences are occasional consequences of this technology due to software limitations.  If there are questions or concerns about the content of this note of information contained within the body of this dictation they should be addressed with the provider for clarification.

## 2022-08-08 NOTE — CONSULTS
PALLIATIVE MEDICINE CONSULTATION     Patient name:Jennifer Reza   URN:[id number]    NBV:[birth year]  Room/Bed:5TN-5585/5585-01   LOS: 1 day         Date of consult:8/8/2022    Consult Information  Palliative Medicine Consult performed by: HERIBERTO Guerrier CNP, CNP    Inpatient consult to Palliative Care  Consult performed by: HERIBERTO Guerrier CNP  Consult ordered by: Xiomara Carrasco MD  Reason for consult: [address] and code status         ASSESSMENT/RECOMMENDATIONS     79 y.o. female with vulvar pain and debility related to Vulvar cancer      Symptom Management:  Vulvar pain- radiation dermatitis added silvadene and topical hydrocortisone cream  Debility- pt reports that she was falling at home due to weak legs   Goals of Care- pt states that she is a [facility] at baseline she tried Tx and wants no additional test or procedures and definitely no cancer Tx. She wants to go home and is ready to die if its her time. Her sister is at bedside and agrees and states that they \"all know this is what she wants\". At pt request referral to [facility] they had them for their mother. Patient/Family Goals of Care :    Goals of Care- pt states that she is a [facility] at baseline she tried Tx and wants no additional test or procedures and definitely no cancer Tx. She wants to go home and is ready to die if its her time. Her sister is at bedside and agrees and states that they \"all know this is what she wants\". At pt request referral to [facility] they had them for their mother. Disposition/Discharge Plan:   pending    Advance Directives:  Surrogate Decision Maker: Ector-spouse  Code status:  DNR-CC    Case discussed with: patient, floor RN, Dr Tran Toledo  Thank you for allowing us to participate in the care of this patient. HISTORY     CC: Vulvar pain  HPI: The patient is a 79 y.o. female with history of Vulvar cancer diagnosed in end of May 2022. She has finished chemotherapy last week.  She is left with 3 more radiation treatment however patient intent to forego this treatment due to severe pain in the perineal area. She now presents to the ER with complaint of shortness of breath, weakness, frequent falls and severe pain around the vagina. In the ED, chest x-ray revealed right pleural effusion. At baseline she is on room air. Today, in the ED oxygen saturation dropped to 81% on room air but SPO2 is 96% with 2 L oxygen by nasal cannula      Palliative Medicine SymptomScreening/ROS:    Review of Systems   Constitutional:  Positive for activity change, appetite change, fatigue and unexpected weight change. Respiratory:  Positive for shortness of breath. Cardiovascular:  Positive for leg swelling. Genitourinary:  Positive for genital sores, vaginal discharge and vaginal pain. Skin:  Positive for pallor. Neurological:  Positive for weakness. All other systems reviewed and are negative. Palliative Performance Scale:     [] 60%  Amb reduced; Sig dz. Can't do hobbies/housework; Intake normal or reduced, Occasional assist; LOC full/confusion   [] 50%  Mainly sit/lie; Extensive disease. Mainly assist, Intake normal or reduced; Occasional assist; LOC full/confusion   [] 40%  Mainly in bed; Extensive disease; Mainly assist; Intake normal or reduced; Occasional assist; LOC full/confusion   [] 30%  Bed bound, Extensive disease; Total care; Intake reduced; LOC full/confusion   [] 20%  Bed bound; Extensive disease; Total care; Intake minimal; Drowsy/coma   [] 10%  Bed bound; Extensive disease; Total care; Mouth care only; Drowsy/coma   []  0%   Death       Home med list and hospital medications reviewed in chart as of 8/8/2022     EXAM     Vitals:    08/08/22 1318   BP: (!) 104/56   Pulse: 91   Resp: 17   Temp: 98.7 °F (37.1 °C)   SpO2: 96%       Physical Exam  Constitutional:       Appearance: She is ill-appearing. HENT:      Head: Normocephalic and atraumatic. Nose: No congestion.       Mouth/Throat:      Mouth:

## 2022-08-08 NOTE — PROCEDURES
PROCEDURE NOTE    PROCEDURE: BEDSIDE PERCUTANEOUS THORACENTESIS    Date of procedure: 8/8/2022    Indication for procedure: Right Pleural effusion    Sedation: None  Anaesthesia: Local; Xylocaine 1% 10ml. Description of procedure: Patient was consented. After discussing risk, benefits and alternative, patient decided to proceed with procedure. A pre-procedure time out was performed verifying correct patient, procedure, site, and positioning. The patient was positioned appropriately for thoracentesis. The patient's right chest was prepped and draped in sterile fashion. Ultrasound was used to identify the pleural pocket. 1% Lidocaine was used to anesthetize the surrounding skin area. Introducer needle was used to enter pleural space. Once pleural fluid without air was aspirated the soft catheter was inserted into the pleural space and connected to a drainage bag. Sample of the pleural fluid was also collected and sent for biochemical, microbiological and cytological examination. About 900 ml of blood tinged pleural fluid was aspirated. Subsequent to this the soft catheter was withdrawn and the puncture site was dressed with a clean dry gauze dressing. Patient's hemodynamics and oxygenation stayed stable throughout the procedure. Patient tolerated procedure well and there were no immediate complications noted.     Estimated Blood Loss: <5ml      Daniella Hernandez MD  Pulmonary Critical Care and Sleep Medicine  8/8/2022, 1:40 PM

## 2022-08-08 NOTE — CARE COORDINATION
Discharge Planning Assessment  Discharge Planning Assessment  RN/SW discharge planner met with patient/ (and family member) to discuss reason for admission, current living situation, and potential needs at the time of discharge-information obtained from chart review, as pt dc'd to home on 08/04/22 and re-admitted 08/07/22    Demographics/Insurance verified Yes    Current type of dwelling:home    Living arrangements:w/spouse    Level of function/Support:independent-sister assists as needed-needs assist for transport    PCP:Enriqueta    DME:none    Active with any community resources/agencies/skilled home care:active w/[facility]    Medication compliance issues:none    Financial issues that could impact healthcare:none    Tentative discharge plan:referral made to [facility]    Transportation at the time of discharge: family vs transport. Aware that pt has [facility] referral made by palliative care-CM following.     Electronically signed by JIN Pedraza on 8/8/2022 at 3:58 PM

## 2022-08-08 NOTE — PLAN OF CARE
Problem: Pain  Goal: Verbalizes/displays adequate comfort level or baseline comfort level  8/8/2022 0523 by Stephan Sommers RN  Outcome: Progressing  8/8/2022 0514 by Stephan Sommers RN  Outcome: Progressing  8/8/2022 0512 by Stephan Sommers RN  Outcome: Progressing     Problem: ABCDS Injury Assessment  Goal: Absence of physical injury  Outcome: Progressing     Problem: Skin/Tissue Integrity  Goal: Absence of new skin breakdown  Description: 1. Monitor for areas of redness and/or skin breakdown  2. Assess vascular access sites hourly  3. Every 4-6 hours minimum:  Change oxygen saturation probe site  4. Every 4-6 hours:  If on nasal continuous positive airway pressure, respiratory therapy assess nares and determine need for appliance change or resting period.   Outcome: Progressing     Problem: Safety - Adult  Goal: Free from fall injury  8/8/2022 0523 by Stephan Sommers RN  Outcome: Progressing  8/8/2022 0514 by Stephan Sommers RN  Outcome: Progressing

## 2022-08-08 NOTE — CONSULTS
Evaluated    Infectious Diseases   Consult Note        Admission Date: 8/7/2022  Hospital Day: Hospital Day: 2   Attending: Jackie Loja MD  Date of service: 8/8/22     Reason for admission: Vulvar cancer [facility]) [C51.9]  Hypoxia [R09.02]  Malignant pleural effusion [J91.0]  Immunodeficiency due to chemotherapy ([facility] [address] [G41.585, T45.1X5A, Z79.899]  Anemia, unspecified type [D64.9]    Chief complaint/ Reason for consult: Complicated perineal area wound with infection    Microbiology:        I have reviewed allavailable micro lab data and cultures    Blood culture (2/2) - collected on 8/7/2022: In process  Urine culture  - collected on 8/7/2022: In process      Antibiotics and immunizations:       Current antibiotics: All antibiotics and their doses were reviewed by me    Recent Abx Admin                     vancomycin 1000 mg IVPB in 250 mL D5W addavial (mg) 1,000 mg New Bag 08/08/22 0235    cefepime (MAXIPIME) 2000 mg IVPB minibag (mg) 2,000 mg New Bag 08/07/22 2228                      Immunization History: All immunization history was reviewed by me today. Immunization History   Administered Date(s) Administered    COVID-19, PFIZER PURPLE top, DILUTE for use, (age 15 y+), 30mcg/0.3mL 03/11/2021, 04/01/2021, 10/22/2021    Pneumococcal Polysaccharide ([id number]) 10/20/2020, 10/20/2020       Known drug allergies: All allergies were reviewed and updated    No Known Allergies    Social history:     Social History:  All social andepidemiologic history was reviewed and updated by me today as needed. Tobacco use:   reports that she has never smoked. She has never used smokeless tobacco.  Alcohol use:   reports no history of alcohol use. Currently lives in: [address]   reports no history of drug use.      COVID VACCINATION AND LAB RESULT RECORDS:     Internal Administration   First Dose COVID-19, PFIZER PURPLE top, DILUTE for use, (age 15 y+), 30mcg/0.3mL  03/11/2021   Second Dose COVID-19, PFIZER PURPLE top, DILUTE for use, (age 15 y+), 30mcg/0.3mL   04/01/2021       Last COVID Lab No results found for: SARS-COV-2, SARS-COV-2 RNA, SARS-COV-2, SARS-COV-2, SARS-COV-2 BY PCR, SARS-COV-2, SARS-COV-2, SARS-COV-2         Assessment:     The patient is a 79 y.o. old female who  has a past medical history of Congenital prolapsed rectum, Displaced bladder, Enlarged, heterogeneous thyroid (05/29/2022), General weakness, Hernia, Mixed hyperlipidemia (10/14/2019), Septic shock ([facility] [address] Severe malnutrition ([facility] [address] (05/31/2022), Type 2 diabetes mellitus without complication ([facility] [address] (45/30/2244), Varicose vein of leg, vulvar cancer, and Vulvar mass. with following problems:    Bilateral leg cellulitis and excoriations  Perineal wound, thought to be secondary to radiation therapy  Vulvar cancer, s/p concurrent chemoradiation. Last radiation treatment was on 7/29/2022, last dose of weekly cisplatin was on 7/28/2022  Right pleural effusion  Anemia and thrombocytopenia  Type 2 diabetes mellitus  Mixed hyperlipidemia  Severe malnutrition  Generalized weakness  Overweight due to excess calorie intake : Body mass index is 27.96 kg/m². Discussion:      The patient is afebrile. She has been started on empiric IV vancomycin and cefepime by primary team.    Blood and urine cultures have been sent on 8/7/2022 and are in process. S creatinine is 0.6    Plan:     Diagnostic Workup:    Agree with blood cultures  Follow-up on urine culture  Continue to follow fever curve, WBC count and blood cultures  Follow up on liverand renal functions closely    Antimicrobials: Will Continue IV vancomycin  Check Vancomycin trough before the 5th dose. Target vancomycin trough level of 15-20  mg/L or vancomycin area under curve (AUC) of 400-600 mg*h/L by Bayesian modeling method. If dosing vancomycin based on trough levels, keep vancomycin trough below 20 at all times.   Avoid increasing the dose of vancomycin above a total of 4 grams in a Relation Age of Onset    High Blood Pressure Mother     Hypertension Mother     High Cholesterol Mother     Macular Degen Mother     Thyroid Disease Mother     Cancer Father     Alcohol Abuse Father     High Blood Pressure Sister     High Cholesterol Sister     Thyroid Disease Sister     Heart Disease Brother     Alcohol Abuse Brother          Medications: All current and past medications were reviewed. Medications Prior to Admission: insulin glargine (LANTUS) 100 UNIT/ML injection vial, Inject 20 Units into the skin nightly  rosuvastatin (CRESTOR) 5 MG tablet, TAKE ONE TABLET BY MOUTH ONCE NIGHTLY  insulin aspart (NOVOLOG) 100 UNIT/ML injection vial, Inject 0-4 Units into the skin in the morning and 0-4 Units at noon and 0-4 Units in the evening. Inject before meals. Sliding scale on Blood sugars over 139. gabapentin (NEURONTIN) 300 MG capsule, FILL 11/3 1 tab in am 1 tab afternoon 1 tab at dinner 3 tabs at bedtime     lidocaine PF  20 mL IntraDERmal Once    sodium chloride flush  5-40 mL IntraVENous 2 times per day    enoxaparin  40 mg SubCUTAneous Daily    gabapentin  300 mg Oral TID    insulin glargine  10 Units SubCUTAneous Nightly    rosuvastatin  5 mg Oral Nightly    insulin lispro  0-8 Units SubCUTAneous TID WC    insulin lispro  0-4 Units SubCUTAneous Nightly          REVIEW OF SYSTEMS:       Review of Systems   Constitutional:  Negative for chills, diaphoresis and fever. HENT:  Negative for ear discharge, ear pain, postnasal drip, rhinorrhea, sinus pressure, sinus pain and sore throat. Eyes:  Negative for discharge and redness. Respiratory:  Negative for cough, shortness of breath and wheezing. Cardiovascular:  Negative for chest pain and leg swelling. Gastrointestinal:  Negative for abdominal pain, constipation, diarrhea and nausea. Endocrine: Negative for cold intolerance, heat intolerance and polydipsia.    Genitourinary:  Negative for dysuria, flank pain, frequency, hematuria and urgency. severe perianal area pain   Musculoskeletal:  Negative for back pain and myalgias. Skin:  Negative for rash. Allergic/Immunologic: Negative for immunocompromised state. Neurological:  Negative for dizziness, seizures and headaches. Hematological:  Does not bruise/bleed easily. Psychiatric/Behavioral:  Negative for agitation, hallucinations and suicidal ideas. The patient is not nervous/anxious. All other systems reviewed and are negative. Objective:       PHYSICAL EXAM:      Vitals:   Vitals:    08/08/22 0214 08/08/22 0512 08/08/22 0859 08/08/22 1109   BP: (!) 104/58 105/60 (!) 95/51 (!) 97/54   Pulse: 88 72 85 92   Resp: 16 16 17    Temp: 97.8 °F (36.6 °C) 98.3 °F (36.8 °C)  97.9 °F (36.6 °C)   TempSrc: Oral Oral  Oral   SpO2: 95% 95%  95%   Weight:       Height:           Physical Exam  Vitals and nursing note reviewed. Constitutional:       Appearance: She is well-developed. She is not diaphoretic. Comments: The patient was seen earlier today. HENT:      Head: Normocephalic and atraumatic. Right Ear: External ear normal. There is no impacted cerumen. Left Ear: External ear normal. There is no impacted cerumen. Nose: Nose normal.      Mouth/Throat:      Mouth: Mucous membranes are moist.      Pharynx: Oropharynx is clear. No oropharyngeal exudate. Eyes:      General: No scleral icterus. Right eye: No discharge. Left eye: No discharge. Conjunctiva/sclera: Conjunctivae normal.      Pupils: Pupils are equal, round, and reactive to light. Neck:      Thyroid: No thyromegaly. Cardiovascular:      Rate and Rhythm: Normal rate and regular rhythm. Heart sounds: Normal heart sounds. No murmur heard. No friction rub. Pulmonary:      Effort: No respiratory distress. Breath sounds: No stridor. No wheezing or rales. Abdominal:      General: Bowel sounds are normal.      Palpations: Abdomen is soft. Tenderness:  There is no CPK:   Lab Results   Component Value Date    CKTOTAL 174 08/07/2022     ESR: No results found for: SEDRATE  CRP: No results found for: CRP      Imaging: All pertinent images and reports for the current visit were reviewed by me during this visit. I reviewed the chest x-ray/CT scan/MRI images and independently interpreted the findings and results today. CT ABDOMEN PELVIS W IV CONTRAST Additional Contrast? None   Final Result   1. Redemonstration of a large exophytic mass arising from the right vulva   consistent with the patient's known malignancy. Given the irregular   exophytic appearance of the lesion, there may be secondary ulceration and   infection. 2. New moderate-sized loculated right pleural effusion concerning for a   malignant effusion. 3. Interval development of anasarca. XR CHEST PORTABLE   Final Result   Moderate right pleural effusion and consolidation of the right lower lobe   concerning for a right lower lobe pneumonia. Outside records:    Labs, Microbiology, Radiology and pertinent results from Care everywhere, if available, were reviewed as a part ofthe consultation.       Problem list:       Patient Active Problem List   Diagnosis Code    Duodenal ulcer K26.9    Pain in both lower extremities M79.604, M79.605    Varicosities of leg I83.90    Elevated blood pressure reading R03.0    Mixed hyperlipidemia E78.2    Type 2 diabetes mellitus with diabetic neuropathy E11.40    Acute cystitis with hematuria N30.01    Severe sepsis (HCC) A41.9, R65.20    Septic shock (HCC) A41.9, R65.21    Tachycardia G98.5    Neutrophilic leukocytosis L28.9    Elevated lactic acid level R79.89    Elevated liver function tests R79.89    General weakness R53.1    Vulvar mass N90.89    Enlarged, heterogeneous thyroid E04.9    Severe malnutrition (HCC) E43    Electrolyte abnormality E87.8    Hypoglycemia E16.2    Hypoxia R09.02    Vulvar cancer (HCC) C51.9    Cancer related pain G89.3 Acute radiation-induced dermatitis L58.0    Leg swelling M79.89    Anemia D64.9    Thrombocytopenia (HCC) D69.6    Failure to thrive in adult R62.7    Frequent falls R29.6    Pleural effusion, right J90    Immunodeficiency due to chemotherapy ([facility] [address] D84.821, T45.1X5A, Z79.899    Malignant pleural effusion J91.0    Bilateral lower leg cellulitis L03.116, L03.115    Malnutrition (HCC) E46    Overweight (BMI 25.0-29. 9) E66.3         Please note that this chart was generated using Dragon dictation software. Although every effort was made to ensure the accuracy of this automated transcription, some errors in transcription may have occurred inadvertently. If you may need any clarification, please do not hesitate to contact me through EPIC or at the phone number provided below with my electronic signature. Any pictures or media included in this note were obtained after taking informed verbal consent from the patient and with their approval to include those in the patient's medical record.         Niurka Alcaraz MD, MPH, [address] [place] [address]  8/8/2022, 11:25 AM  [facility] Infectious Disease   [address] [place] [address] 200 [facility], [address] [place] [address]  Office: [phone]  Fax: [phone]  Clinic days:  Tuesday & Thursday

## 2022-08-08 NOTE — H&P
[facility] Medicine History & Physical      PCP: HERIBERTO Daniel CNP    Date of Admission: 8/7/2022    Date of Service: Pt seen/examined on 08/07/22 and Admitted to Inpatient with expected LOS greater than two midnights due to medical therapy. Chief Complaint: Generalized weakness and shortness of breath      History Of Present Illness:  Paxton Escobar is 79 y.o. female who presented with complaint of generalized weakness. Symptom onset was acute for a time period of 3 days. The severity is described as severe. The course of his symptoms over time is worsening. The symptoms improved with rest and worsened with attempt to ambulate. The patient's symptom is associated with pain in the perineal area. Paxton Escobar is 79 y.o. female with history of Vulvar cancer diagnosed in end of May 2022  She has finished chemotherapy, the last dose was given on Thursday  She is left with 3 more radiation treatment however patient intent to forego this treatment due to severe pain in the perineal area  She now presents to the ER with complaint of shortness of breath, weakness, frequent falls and severe pain around the vagina  She has bilateral leg weakness which she thinks worsens her ability to ambulate in addition to her fatigue  She feels wiped out and has no energy to do anything  In the ED, chest x-ray revealed right pleural effusion. At baseline she is on room air.   Today, in the ED oxygen saturation dropped to 81% on room air but SPO2 is 96% with 2 L oxygen by nasal cannula  Her hemoglobin was 6.0 therefore she was given 1 unit of PRBC and admitted for further management          Past Medical History:          Diagnosis Date    Congenital prolapsed rectum     Displaced bladder     Enlarged, heterogeneous thyroid 05/29/2022    General weakness     Hernia     Mixed hyperlipidemia 10/14/2019    Septic shock ([address]     5/2022    Severe malnutrition ([facility] [address] 05/31/2022    Type 2 diabetes mellitus without complication ([facility] [address] 22/34/1994    Varicose vein of leg     vulvar cancer     vulva    Vulvar mass        Past Surgical History:          Procedure Laterality Date    PORT SURGERY N/A 6/8/2022    PORT A CATHETER INSERTION (NON ACCESSED) performed by Jorgito Huizar MD at [place] [id number]       Medications Prior to Admission:      Prior to Admission medications    Medication Sig Start Date End Date Taking? Authorizing Provider   ciprofloxacin (CIPRO) 500 MG tablet Take 1 tablet by mouth in the morning and 1 tablet before bedtime. Do all this for 3 days. 8/4/22 8/7/22  Ameya Koch MD   insulin glargine (LANTUS) 100 UNIT/ML injection vial Inject 20 Units into the skin nightly    Historical Provider, MD   rosuvastatin (CRESTOR) 5 MG tablet TAKE ONE TABLET BY MOUTH ONCE NIGHTLY 7/5/22   HERIBERTO Slater CNP   insulin aspart (NOVOLOG) 100 UNIT/ML injection vial Inject 0-4 Units into the skin in the morning and 0-4 Units at noon and 0-4 Units in the evening. Inject before meals. Sliding scale on Blood sugars over 139. Historical Provider, MD   gabapentin (NEURONTIN) 300 MG capsule FILL 11/3 1 tab in am 1 tab afternoon 1 tab at dinner 3 tabs at bedtime 4/18/22 10/1/22  HERIBERTO Slater CNP       Allergies:  Patient has no known allergies. Social History:      The patient currently lives at home    TOBACCO:   reports that she has never smoked. She has never used smokeless tobacco.  ETOH:   reports no history of alcohol use. E-cigarette/Vaping       Questions Responses    E-cigarette/Vaping Use Never User    Start Date     Passive Exposure     Quit Date     Counseling Given     Comments               Family History:      Reviewed and negative in regards to presenting illness/complaint.         Problem Relation Age of Onset    High Blood Pressure Mother     Hypertension Mother     High Cholesterol Mother     Macular Degen Mother     Thyroid Disease Mother     Cancer Father     Alcohol Abuse Father     High Blood Pressure Sister     High Cholesterol Sister     Thyroid Disease Sister     Heart Disease Brother     Alcohol Abuse Brother        REVIEW OF SYSTEMS COMPLETED:   Pertinent positives as noted in the HPI. All other systems reviewed and negative. PHYSICAL EXAM PERFORMED:    BP (!) 104/44   Pulse 86   Temp 98.7 °F (37.1 °C) (Oral)   Resp 14   Ht 5' 1\" (1.549 m)   Wt 148 lb (67.1 kg)   SpO2 96%   BMI 27.96 kg/m²     General appearance:  No apparent distress, appears stated age and cooperative. HEENT:  Normal cephalic, atraumatic without obvious deformity. Pupils equal, round, and reactive to light. Extra ocular muscles intact. Conjunctivae/corneas clear. Neck: Supple, with full range of motion. No jugular venous distention. Trachea midline. Respiratory:  Normal respiratory effort. Clear to auscultation, bilaterally without Rales/Wheezes/Rhonchi. Cardiovascular:  Regular rate and rhythm with normal S1/S2 without murmurs, rubs or gallops. Abdomen: Soft, non-tender, non-distended with normal bowel sounds. Musculoskeletal:  No clubbing, cyanosis or edema bilaterally. Full range of motion without deformity. Skin: Skin color, texture, turgor normal.  No rashes or lesions. Neurologic:  Neurovascularly intact without any focal sensory/motor deficits.  Cranial nerves: II-XII intact, grossly non-focal.  Psychiatric:  Alert and oriented, thought content appropriate, normal insight  Capillary Refill: Brisk,3 seconds, normal  Peripheral Pulses: +2 palpable, equal bilaterally       Labs:     Recent Labs     08/07/22 2004   WBC 7.8   HGB 6.0*   HCT 19.0*   PLT 57*     Recent Labs     08/07/22 2004      K 3.7      CO2 28   BUN 24*   CREATININE 0.8   CALCIUM 7.1*     Recent Labs     08/07/22 2004   AST 22   ALT 20   BILITOT <0.2   ALKPHOS 84     Recent Labs     08/07/22 2004   INR 1.18*     Recent Labs     08/07/22 2004   CKTOTAL 174   TROPONINI 0.05*       Urinalysis:      Lab treatments for radiation-induced dermatitis around the perineal area  Per oncology note she has a tendency to become hypoglycemic therefore will carefully titrate her insulin regimen  1 unit of PRBC ordered in the ED. Repeat CBC in the morning and trend hemoglobin and platelets count    DVT Prophylaxis: Lovenox  Diet: ADULT DIET; Regular  Code Status: Full Code    PT/OT Eval Status: PT/OT consult is ordered    Dispo - admit as inpatient       Shonda Cox MD    Thank you HERIBERTO Lion CNP for the opportunity to be involved in this patient's care. If you have any questions or concerns please feel free to contact me at [phone].

## 2022-08-08 NOTE — FLOWSHEET NOTE
.4 Eyes Skin Assessment     NAME:  Paxton Escobar  YOB: 1955  MEDICAL RECORD NUMBER:  [id number]    The patient is being assess for  Admission    I agree that 2 RN's have performed a thorough Head to Toe Skin Assessment on the patient. ALL assessment sites listed below have been assessed. Areas assessed by both nurses:    Head, Face, Ears, Shoulders, Back, Chest, Arms, Elbows, Hands, Sacrum. Buttock, Coccyx, Ischium, and Legs. Feet and Heels        Does the Patient have a Wound? Yes wound(s) were present on assessment.  LDA wound assessment was Initiated and completed        Eric Prevention initiated:  Yes   Wound Care Orders initiated:  Yes    Pressure Injury (Stage 3,4, Unstageable, DTI, NWPT, and Complex wounds) if present place referral/consult order under [de-identified] Yes    New and Established Ostomies if present place consult order under : No      Nurse 1 eSignature: Electronically signed by Marciano Cabrera RN on 8/8/22 at 4:49 AM EDT    **SHARE this note so that the co-signing nurse is able to place an eSignature**    Nurse 2 eSignature: Electronically signed by Rich Borges RN on 8/8/22 at 5:05 AM EDT

## 2022-08-08 NOTE — DISCHARGE INSTR - COC
Continuity of Care Form    Patient Name: Romario Santana   :  1955  MRN:  [id number]    Admit date:  2022  Discharge date:  ***    Code Status Order: DNR-CC   Advance Directives:     Admitting Physician:  Radha Caldwell MD  PCP: Jammie Sultana, HERIBERTO - CNP    Discharging Nurse: [facility] Unit/Room#: 0DD-9881/7250-82  Discharging Unit Phone Number: ***    Emergency Contact:   Extended Emergency Contact Information  Primary Emergency Contact: [address] [place] [address], [facility] Phone: [phone]  Mobile Phone: [phone]  Relation: Child  Secondary Emergency Contact: Krishan [facility]  Address: [address] [place] [address], [address] [place] [address] Phone: [phone]  Mobile Phone: [phone]  Relation: Spouse    Past Surgical History:  Past Surgical History:   Procedure Laterality Date    PORT SURGERY N/A 2022    PORT A CATHETER INSERTION (NON ACCESSED) performed by Lexa Angulo MD at [place] [address]       Immunization History:   Immunization History   Administered Date(s) Administered    COVID-19, PFIZER PURPLE top, DILUTE for use, (age 15 y+), 30mcg/0.3mL 2021, 2021, 10/22/2021    Pneumococcal Polysaccharide ([id number]) 10/20/2020, 10/20/2020       Active Problems:  Patient Active Problem List   Diagnosis Code    Duodenal ulcer K26.9    Pain in both lower extremities M79.604, M79.605    Varicosities of leg I83.90    Elevated blood pressure reading R03.0    Mixed hyperlipidemia E78.2    Type 2 diabetes mellitus with diabetic neuropathy E11.40    Acute cystitis with hematuria N30.01    Severe sepsis (HCC) A41.9, R65.20    Septic shock (HCC) A41.9, R65.21    Tachycardia T33.8    Neutrophilic leukocytosis Y85.7    Elevated lactic acid level R79.89    Elevated liver function tests R79.89    General weakness R53.1    Vulvar mass N90.89    Enlarged, heterogeneous thyroid E04.9    Severe malnutrition (HCC) E43    Electrolyte abnormality E87.8 Hypoglycemia E16.2    Hypoxia R09.02    Vulvar cancer (HCC) C51.9    Cancer related pain G89.3    Acute radiation-induced dermatitis L58.0    Leg swelling M79.89    Anemia D64.9    Thrombocytopenia (HCC) D69.6    Failure to thrive in adult R62.7    Frequent falls R29.6    Pleural effusion, right J90    Immunodeficiency due to chemotherapy ([facility] D84.821, T45.1X5A, Z79.899    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 9) E66.3       Isolation/Infection:   Isolation            Contact  Contact  Droplet Plus          Patient Infection Status       Infection Onset Added Last Indicated Last Indicated By Review Planned Expiration Resolved Resolved By    COVID-19 (Rule Out) 08/07/22 08/07/22 08/08/22 COVID-19 (Ordered) 08/18/22 08/22/22      MDRO (multi-drug resistant organism) 05/29/22 06/01/22 06/01/22 Edil Burton Post, RN        Urine    ESBL (Extended Spectrum Beta Lactamase) 05/29/22 06/01/22 08/02/22 Culture, Urine                Nurse Assessment:  Last Vital Signs: BP (!) 95/48   Pulse 89   Temp 98.5 °F (36.9 °C) (Oral)   Resp 17   Ht 5' 1\" (1.549 m)   Wt 148 lb (67.1 kg)   SpO2 94%   BMI 27.96 kg/m²     Last documented pain score (0-10 scale): Pain Level: 3  Last Weight:   Wt Readings from Last 1 Encounters:   08/07/22 148 lb (67.1 kg)     Mental Status:  oriented    IV Access:  - left chest accessed port    Nursing Mobility/ADLs:  Walking   Assisted  Transfer  Assisted  Bathing  Dependent  Dressing  Dependent  Toileting  Dependent  Feeding  Independent  Med Admin  Dependent  Med Delivery   whole    Wound Care Documentation and Therapy:  Wound Buttocks (Active)   Dressing Status Other (Comment) 08/02/22 2045   Wound Cleansed Not Cleansed 08/02/22 2045   Dressing/Treatment Open to air 08/02/22 2045   Wound Assessment Pink/red 08/02/22 1426   Drainage Amount None 08/02/22 1426   Number of days:        Wound Thigh Left;Proximal (Active)   Wound Cleansed Soap and water 08/02/22 1426   Dressing/Treatment Open to air 08/02/22 1426   Drainage Amount None 08/02/22 1426   Number of days:        Wound Buttocks Left (Active)   Dressing Status Other (Comment) 08/02/22 2045   Wound Cleansed Not Cleansed 08/02/22 2045   Dressing/Treatment Open to air 08/02/22 1426   Number of days:        Wound Buttocks Left;Right involving  right and left buttocks, right and left upper thighs , benita groin area  stage 3 ,  wound area pink red   open  difficult to get protective cream to stick   has been getting chemo and radiation for  about 7 weeks (Active)   Wound Image     08/08/22 1900   Wound Etiology Pressure Stage 3 08/03/22 1600   Dressing Status Other (Comment) 08/03/22 1600   Wound Cleansed Soap and water 08/03/22 1600   Dressing/Treatment ABD;Hydrating gel; Pharmaceutical agent (see MAR) 08/08/22 1900   Tunneling Position ___ O'Clock unable to detemine wound care to asess 08/01/22 1800   Wound Assessment Devitalized tissue;Erythema;Pink/red 08/08/22 1900   Drainage Amount None 08/03/22 1600   Odor None 08/03/22 1600   Benita-wound Assessment Erosion;Fragile; Warm;Non-blanchable erythema 08/08/22 1900   Wound Thickness Description not for Pressure Injury Full thickness 08/01/22 1800   Number of days:        Wound 08/01/22 Hip Left deep red purple (Active)   Wound Cleansed Soap and water 08/02/22 1426   Number of days: 7       Wound 08/08/22 Vagina Anterior;Posterior; Lower; Upper;Mid;Medial;Outer;Right;Left (Active)   Wound Image   08/08/22 1900   Wound Etiology Burn 08/08/22 1900   Dressing Status New dressing applied 08/08/22 1900   Wound Cleansed Not Cleansed 08/08/22 1900   Dressing/Treatment Hydrating gel;Gauze dressing/dressing sponge; Pharmaceutical agent (see MAR) 08/08/22 1900   Wound Assessment Erythema; Other (Comment) 08/08/22 1900   Drainage Amount Moderate 08/08/22 0859   Drainage Description Yellow 08/08/22 0859   Odor Moderate 08/08/22 0859   Olinda-wound Assessment Edematous; Warm 08/08/22 1900   Margins Unattached edges 08/08/22 0859   Wound Thickness Description not for Pressure Injury Partial thickness 08/08/22 0859   Number of days: 0       Wound Vagina (Active)   Number of days:        Incision 06/08/22 Chest Left;Upper (Active)   Number of days: 61   Apply silvadene as ordered,  with hydrogel to abd pad. Apply to ischium  and buttocks wounds   For vaginal wounds apply silvadene as ordered, with hydrogel to non stick pad, lay over wound. May cleanse wounds with foam cleanser as tolerated by patient. Apply hydrocortisone cream four times daily to affected area. Elimination:  Continence: Bowel: Yes  Bladder: Yes  Urinary Catheter: None   Colostomy/Ileostomy/Ileal Conduit: No       Date of Last BM: 08/07/22    Intake/Output Summary (Last 24 hours) at 8/8/2022 1932  Last data filed at 8/8/2022 1540  Gross per 24 hour   Intake 327.5 ml   Output 350 ml   Net -22.5 ml     I/O last 3 completed shifts: In: 327.5 [Blood:327.5]  Out: 350 [Urine:350]    Safety Concerns:     History of Falls (last 30 days)    Impairments/Disabilities:      None    Nutrition Therapy:  Current Nutrition Therapy:   - Oral Diet:  General    Routes of Feeding: Oral  Liquids:  Thin Liquids  Daily Fluid Restriction: no  Last Modified Barium Swallow with Video (Video Swallowing Test): not done    Treatments at the Time of Hospital Discharge:   Respiratory Treatments: ***  Oxygen Therapy:  {Therapy; copd oxygen:53279}  Ventilator:    - No ventilator support    Rehab Therapies: ***  Weight Bearing Status/Restrictions: No weight bearing restrictions  Other Medical Equipment (for information only, NOT a DME order):  cane  Other Treatments: ***    Patient's personal belongings (please select all that are sent with patient):  None    RN SIGNATURE:  Electronically signed by Yuri Freire RN on 8/9/22 at 4:29 PM EDT    CASE MANAGEMENT/SOCIAL WORK SECTION    Inpatient Status Date: ***    Readmission Risk Assessment Score:  Readmission Risk              Risk of Unplanned Readmission:  [id number]           Discharging to Facility/ Agency   Name:   Address:  Phone:  Fax:    Dialysis Facility (if applicable)   Name:  Address:  Dialysis Schedule:  Phone:  Fax:    / signature: {Esignature:[id number]}    PHYSICIAN SECTION    Prognosis: {Prognosis:9296451653}    Condition at Discharge: [address] [place] [address] Patient Condition:[id number]}    Rehab Potential (if transferring to Rehab): {Prognosis:2380315859}    Recommended Labs or Other Treatments After Discharge: ***    Physician Certification: I certify the above information and transfer of Ayanna Vieira  is necessary for the continuing treatment of the diagnosis listed and that she requires {Admit to Appropriate Level of Care:10747} for {GREATER/LESS:973189472} 30 days.      Update Admission H&P: {CHP DME Changes in BUZN}    PHYSICIAN SIGNATURE:  {Esignature:[id number]}

## 2022-08-08 NOTE — CARE COORDINATION
Patient has wound care consult for radiation burns from cancer treatment. Patient reports she last had radiation two weeks  ago. Patient has perineal wounds and wound to outer right  labia. Patient cried out in pain when wounds were touched. Patient reports pain when wetness touches wounds. Patient has exposed dermis to right ischium and near gluteal cleft. The pubic area is red, with some swelling noted to right outer labia. Silvadene cream and hydrocortisone cream ordered. Applied silvadene cream with hydrogel to  abd pads and place over wounds on buttocks and ischium. Applied silvadene cream and hydrogel gauze for wound to right labia. Patient very tearful, Sister at bedside, both did not want any water to cleanse wounds. Would suggest foam cleanser if areas soiled by urine or stool. Also suggested ice pack to affected area to help bring some relief from burning pain. Per nurse Rachna 62 patient maybe discharged to hospice care. Will continue to follow and offer support to patient and family. ALAN Hinojosa, BSN, RN, [facility], [facility]., [facility]-  Inpatient  [facility]  [phone]        Right ischium     Right ischium.  Gluteal cleft    Right labia

## 2022-08-08 NOTE — CONSULTS
Oncology Hematology Care   CONSULT NOTE    8/8/2022 8:39 AM    Patient Information: Alissa Carpenter   Date of Admit:  8/7/2022  Primary Care Physician:  HERIBERTO Mueller - CNP  Requesting Physician:  Jacqualine Primrose, MD    Reason for consult:   Evaluation and recommendations for vulvar cancer. Chief complaint:    Chief Complaint   Patient presents with    Fatigue     Pt arrived from home via [facility] w c/o of severe weakness d/t cancer chemo Tx (last one was on Thursday). Pt also complaints of multiple burns form chemo. History of Present Illness:  Alissa Carpenter is a 79 y.o. female on Jacqualine Primrose, MD service who was admitted on 8/7/2022 for generalized weakness. She presented to the ER with increase in shortness of breath and pain around vagina. Hgb was 6.0 in ER, she was given 1 unit pRBCs. Chest x-ray revealed right pleural effusion. She is a patient of Dr. Wm Sy who has vulvar cancer. She is s/p weekly cisplatin x 7 doses with concurrent radiation. Last dose of Cisplatin given on 7/28/2022. Radiation completed on 7/29/2022. Past Medical History:     has a past medical history of Congenital prolapsed rectum, Displaced bladder, Enlarged, heterogeneous thyroid, General weakness, Hernia, Mixed hyperlipidemia, Septic shock ([address] Severe malnutrition ([address] Type 2 diabetes mellitus without complication ([address] Varicose vein of leg, vulvar cancer, and Vulvar mass.      Past Surgical History:    Past Surgical History:   Procedure Laterality Date    PORT SURGERY N/A 6/8/2022    PORT A CATHETER INSERTION (NON ACCESSED) performed by Gabby Coto MD at [place] [address]        Current Medications:     sodium chloride flush  5-40 mL IntraVENous 2 times per day    enoxaparin  40 mg SubCUTAneous Daily    gabapentin  300 mg Oral TID    insulin glargine  10 Units SubCUTAneous Nightly    rosuvastatin  5 mg Oral Nightly    insulin lispro  0-8 Units SubCUTAneous TID     insulin lispro 0-4 Units SubCUTAneous Nightly       Allergies:    No Known Allergies     Social History:    reports that she has never smoked. She has never used smokeless tobacco. She reports that she does not drink alcohol and does not use drugs. Family History:     family history includes Alcohol Abuse in her brother and father; Cancer in her father; Heart Disease in her brother; High Blood Pressure in her mother and sister; High Cholesterol in her mother and sister; Hypertension in her mother; Dustin Janice in her mother; Thyroid Disease in her mother and sister. ROS:      Per HPI; otherwise 10 point ROS is negative    PHYSICAL EXAM:    Vitals:  Vitals:    08/08/22 0512   BP: 105/60   Pulse: 72   Resp: 16   Temp: 98.3 °F (36.8 °C)   SpO2: 95%        Intake/Output Summary (Last 24 hours) at 8/8/2022 0839  Last data filed at 8/8/2022 0214  Gross per 24 hour   Intake 327.5 ml   Output --   Net 327.5 ml      Wt Readings from Last 3 Encounters:   08/07/22 148 lb (67.1 kg)   08/03/22 148 lb 9.4 oz (67.4 kg)   06/08/22 126 lb (57.2 kg)        General appearance: Appears comfortable. Eyes: Sclera clear, pupils equal  ENT: Moist mucus membranes, no thrush  Neck: Trachea midline, symmetrical  Cardiovascular: Regular rhythm, normal S1, S2. No murmur, gallop, rub. Respiratory: Clear to auscultation bilaterally. No wheeze. Gastrointestinal: Abdomen soft, not tender, not distended, normal bowel sounds  Musculoskeletal: No cyanosis in digits, warm extremities  Neurologic: Cranial nerves grossly intact, no motor or speech deficits. Psychiatric: Normal affect. Alert and oriented to time, place and person.   Skin: Warm, dry, normal turgor, no rash    DATA:  CBC:   Lab Results   Component Value Date/Time    WBC 7.8 08/08/2022 06:18 AM    RBC 2.38 08/08/2022 06:18 AM    HGB 6.9 08/08/2022 06:18 AM    HCT 21.6 08/08/2022 06:18 AM    MCV 90.7 08/08/2022 06:18 AM    MCH 29.1 08/08/2022 06:18 AM    MCHC 32.1 08/08/2022 06:18 AM    RDW 25.0 08/08/2022 06:18 AM    PLT 53 08/08/2022 06:18 AM    MPV 8.5 08/08/2022 06:18 AM     BMP:  Lab Results   Component Value Date/Time     08/08/2022 06:18 AM    K 3.3 08/08/2022 06:18 AM    K 3.7 08/07/2022 08:04 PM    CL 99 08/08/2022 06:18 AM    CO2 29 08/08/2022 06:18 AM    BUN 18 08/08/2022 06:18 AM    CREATININE 0.6 08/08/2022 06:18 AM    CALCIUM 7.0 08/08/2022 06:18 AM    GFRAA >60 08/08/2022 06:18 AM    LABGLOM >60 08/08/2022 06:18 AM    GLUCOSE 117 08/08/2022 06:18 AM     Magnesium:   Lab Results   Component Value Date/Time    MG 1.60 08/02/2022 05:10 AM    MG 1.40 08/01/2022 01:11 PM    MG 1.10 08/01/2022 03:39 AM     LIVER PROFILE:   Recent Labs     08/07/22 2004 08/08/22  0618   AST 22 18   ALT 20 17   BILIDIR  --  <0.2   BILITOT <0.2 0.3   ALKPHOS 84 78     PT/INR:    Lab Results   Component Value Date/Time    PROTIME 14.7 08/08/2022 06:18 AM    PROTIME 15.0 08/07/2022 08:04 PM    INR 1.15 08/08/2022 06:18 AM    INR 1.18 08/07/2022 08:04 PM       IMPRESSION/RECOMMENDATIONS:    Principal Problem:    Pleural effusion, right  Active Problems:    Severe malnutrition (HCC)    Hypoxia    Vulvar cancer ([facility]    Cancer related pain    Acute radiation-induced dermatitis    Leg swelling    Anemia    Thrombocytopenia (HCC)    Failure to thrive in adult    Frequent falls    Type 2 diabetes mellitus with diabetic neuropathy  Resolved Problems:    * No resolved hospital problems. *       71-year-old female who has:    Vulvar cancer  - follows with Dr. Nadine Alvarado  - s/p concurrent chemoradiation. Weekly cisplatin x 7 with last dose on 7/28/2022. Radiation completed on 7/29/2022, last 3 doses held.      2. Anemia and thrombocytopenia  - secondary to chemoradiation  - Hgb 6.9 today, s/p one unit pRBCs on 8/7/2022  - one unit pRBC transfusion ordered per primary team today  - plts 53k today, no bleeding  - transfuse to keep Hgb above 7  - iron studies done on 6/16/2022 revealed iron deficiency anemia, she was started on ferrous sulfate BID  - Will recheck iron studies, B12, and folate  - check LDH and haptoglobin    3. Right pleural effusion  - pulmonology and ID consulted    4. Perineal wound  - secondary to radiation  - wound care consulted      This plan was discussed with the patient and he/she verbalized understanding. Thank you for allowing us to participate in the care of this patient. HERIBERTO Khan CNP  Oncology Hematology [facility]  [phone]     Patient was seen with YELITZA in the morning. Admitted with increased shortness of breath and weakness. Found to be anemic with hemoglobin of 6. Had 1 unit of packed red blood cell transfusion. Recently finished concurrent chemoradiation with weekly cisplatin. Also thrombocytopenia. No signs of active bleeding. This is likely due to marrow suppression from chemoradiation. Recommend 1 more unit of packed red blood cell transfusion. Continue supportive care. Explained to the patient. Terra Lantigua.  Julio Gee MD, [place] [address]  Hematology and Oncology  [facility]  [phone]

## 2022-08-08 NOTE — PLAN OF CARE
Problem: Pain  Goal: Verbalizes/displays adequate comfort level or baseline comfort level  8/8/2022 0514 by Randy Schultz RN  Outcome: Progressing  8/8/2022 0512 by Randy Schultz RN  Outcome: Progressing     Problem: Safety - Adult  Goal: Free from fall injury  Outcome: Progressing

## 2022-08-08 NOTE — ED NOTES
Stayed with pt for 1st 15 min of blood transfusion. No s/s allergic reaction. Vitals obtained as noted.      Desire Madsen, [place]  08/07/22 7203

## 2022-08-08 NOTE — CONSENT
Informed Consent for Blood Component Transfusion Note    I have discussed with the patient the rationale for blood component transfusion; its benefits in treating or preventing fatigue, organ damage, or death; and its risk which includes mild transfusion reactions, rare risk of blood borne infection, or more serious but rare reactions. I have discussed the alternatives to transfusion, including the risk and consequences of not receiving transfusion. The patient had an opportunity to ask questions and had agreed to proceed with transfusion of blood components. Transfuse PRBC due to critically low hgb.     Electronically signed by Moon Batres MD on 8/7/22 at 9:25 PM EDT

## 2022-08-08 NOTE — ED NOTES
Pt with deep open wounds on left and right buttocks, perineal area left and right upper thighs d/t radiation Tx. Wound are red, no active bleeding.      Roberto Tan RN  08/07/22 0157

## 2022-08-09 PROBLEM — R53.1 GENERALIZED WEAKNESS: Status: ACTIVE | Noted: 2022-01-01

## 2022-08-09 NOTE — PROGRESS NOTES
Palliative care note reviewed. The patient has decided to go for [facility] and comfort care options. Antibiotics have been stopped by primary. Thanks for allowing me to participate in your patient's care.  I will sign off today, but will be available to answer any further questions or concerns that may arise during patient's stay in the hospital.       Moira Martin MD, MPH, [facility], [address] [place] [address]  8/9/2022, 2:18 PM  [facility] Infectious Disease   [address] [place] [address], [address] [place] [address]  Office: [phone]  Fax: [phone]  Clinic days:  Tuesday & Thursday

## 2022-08-09 NOTE — PROGRESS NOTES
Neurological:  Positive for weakness. All other systems reviewed and are negative. OBJECTIVE   BP (!) 113/54   Pulse 94   Temp 98.3 °F (36.8 °C) (Oral)   Resp 17   Ht 5' 1\" (1.549 m)   Wt 148 lb (67.1 kg)   SpO2 90%   BMI 27.96 kg/m²   I/O last 3 completed shifts: In: 692.1 [Blood:692.1]  Out: 350 [Urine:350]  No intake/output data recorded. Physical Exam  Constitutional:       Appearance: She is ill-appearing. HENT:      Head: Normocephalic and atraumatic. Nose: No congestion. Mouth/Throat:      Mouth: Mucous membranes are dry. Eyes:      Pupils: Pupils are equal, round, and reactive to light. Cardiovascular:      Rate and Rhythm: Normal rate. Heart sounds: No murmur heard. No friction rub. No gallop. Pulmonary:      Effort: No respiratory distress. Abdominal:      Palpations: Abdomen is soft. Genitourinary:     Comments: Vulva with moist desquamation and edema and erythema  Musculoskeletal:      Cervical back: Normal range of motion. Right lower leg: Edema present. Left lower leg: Edema present. Skin:     General: Skin is warm and dry. Coloration: Skin is pale. Neurological:      General: No focal deficit present. Mental Status: She is alert.           Total time: 35 minutes  >50% of time spent counseling patient at bedside or POA/family member if applicable , reviewing information and discussing care, coordinating with care team       Signed By: Electronically signed by HERIBERTO Christy CNP on 8/9/2022 at 10:29 AM   Palliative Medicine   [phone]    August 9, 2022

## 2022-08-09 NOTE — CARE COORDINATION
CM faxed hospice referral to [facility] and spoke with Chirag Adan in [facility] referral center.  Referral will be sent to [address] [place] [address].     Update: Patient will transfer to [facility] inpatient unit today at 6 pm with [facility]     Electronically signed by Hannah Ramírez RN on 8/9/2022 at 10:28 AM
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
## 2022-08-09 NOTE — PROGRESS NOTES
[facility]   Progress Note      8/9/2022 8:28 AM        Name: Greg Lemos . Admitted: 8/7/2022    SUBJECTIVE:  she is feeling better, she offers no new complaints, hoping to go home soon. Reviewed interval ancillary notes    Current Medications  morphine injection 4 mg, Q3H PRN  potassium chloride (KLOR-CON M) extended release tablet 40 mEq, PRN   Or  potassium bicarb-citric acid (EFFER-K) effervescent tablet 40 mEq, PRN   Or  potassium chloride 10 mEq/100 mL IVPB (Peripheral Line), PRN  magnesium sulfate 2000 mg in 50 mL IVPB premix, PRN  perflutren lipid microspheres (DEFINITY) injection 1.65 mg, ONCE PRN  silver sulfADIAZINE (SILVADENE) 1 % cream, Daily  hydrocortisone 1 % cream, 4x Daily  sodium chloride flush 0.9 % injection 5-40 mL, 2 times per day  sodium chloride flush 0.9 % injection 5-40 mL, PRN  0.9 % sodium chloride infusion, PRN  ondansetron (ZOFRAN-ODT) disintegrating tablet 4 mg, Q8H PRN   Or  ondansetron (ZOFRAN) injection 4 mg, Q6H PRN  polyethylene glycol (GLYCOLAX) packet 17 g, Daily PRN  acetaminophen (TYLENOL) tablet 650 mg, Q6H PRN   Or  acetaminophen (TYLENOL) suppository 650 mg, Q6H PRN  gabapentin (NEURONTIN) capsule 300 mg, TID  dextrose bolus 10% 125 mL, PRN   Or  dextrose bolus 10% 250 mL, PRN  glucagon (rDNA) injection 1 mg, PRN  dextrose 10 % infusion, Continuous PRN        Objective:  BP (!) 104/48   Pulse 97   Temp 100 °F (37.8 °C) (Oral)   Resp 18   Ht 5' 1\" (1.549 m)   Wt 148 lb (67.1 kg)   SpO2 90%   BMI 27.96 kg/m²     Intake/Output Summary (Last 24 hours) at 8/9/2022 0828  Last data filed at 8/8/2022 1540  Gross per 24 hour   Intake 364.58 ml   Output 350 ml   Net 14.58 ml      Wt Readings from Last 3 Encounters:   08/07/22 148 lb (67.1 kg)   08/03/22 148 lb 9.4 oz (67.4 kg)   06/08/22 126 lb (57.2 kg)       General appearance:  Appears comfortable  Eyes: Sclera clear. Pupils equal.  ENT: Moist oral mucosa.  Trachea midline, no chemoradiation  - Hgb 8.1 today,   - s/p one unit pRBCs on 8/7/2022 and 8/8/22  - plts 47k today, no bleeding  - transfuse to keep Hgb above 7  - iron studies done on 6/16/2022 revealed iron deficiency anemia, she was started on ferrous sulfate BID    3. Right pleural effusion  - pulmonology and ID consulted     4. Perineal wound  - secondary to radiation  - wound care following    [place], [place]  [facility]     Patient was seen this morning. Feeling better. Labs were reviewed. Anemia has improved with packed red blood cell transfusion. Discussed with Dr. Sandy Thomas the radiation oncologist.  It might be too premature to consider hospice care. Continue supportive care. Sushila Cedeno.  Jac Jensen MD, [place] [id number]  Hematology and Oncology  [facility]  [phone]

## 2022-08-09 NOTE — PROGRESS NOTES
Hospitalist Progress Note      PCP: Buddy Palma, APRN - CNP    Date of Admission: 8/7/2022    Chief Complaint: Frequent falls and generalized weakness    Hospital Course: Camille Cobian is 79 y.o. 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 She presented to the ER with complaint of generalized weakness, worsening generalized edema, frequent falls and severe pain around the perineum and inability to care for herself at home. In the ED, chest x-ray revealed right pleural effusion. In the ED oxygen saturation dropped to 81% on room air but SPO2 was 96% with 2 L oxygen NC. Hemoglobin was 6.0 therefore she was given 1 unit of PRBC and admitted for further management. Subjective: Patient seen and examined. c/o generalized weakness and severe perineal pain. She repeatedly mentions how she regrets going through chemoradiation.          Medications:  Reviewed    Infusion Medications    sodium chloride 25 mL/hr at 08/08/22 0242    dextrose       Scheduled Medications    silver sulfADIAZINE   Topical Daily    hydrocortisone   Topical 4x Daily    metroNIDAZOLE  500 mg Oral 3 times per day    sodium chloride flush  5-40 mL IntraVENous 2 times per day    enoxaparin  40 mg SubCUTAneous Daily    gabapentin  300 mg Oral TID    insulin glargine  10 Units SubCUTAneous Nightly    rosuvastatin  5 mg Oral Nightly    insulin lispro  0-8 Units SubCUTAneous TID WC    insulin lispro  0-4 Units SubCUTAneous Nightly     PRN Meds: morphine, potassium chloride **OR** potassium alternative oral replacement **OR** potassium chloride, magnesium sulfate, perflutren lipid microspheres, sodium chloride flush, sodium chloride, ondansetron **OR** ondansetron, polyethylene glycol, acetaminophen **OR** acetaminophen, dextrose bolus **OR** dextrose bolus, glucagon (rDNA), dextrose      Intake/Output Summary (Last 24 hours) at 8/8/2022 2218  Last data filed at 8/8/2022 1540  Gross per 24 hour   Intake 692.08 ml   Output 350 ml   Net 342.08 ml       Physical Exam Performed:    BP (!) 114/46   Pulse 98   Temp 98.7 °F (37.1 °C) (Oral)   Resp 20   Ht 5' 1\" (1.549 m)   Wt 148 lb (67.1 kg)   SpO2 90%   BMI 27.96 kg/m²     General appearance: Chronically ill looking, no apparent distress, appears stated age and cooperative. HEENT: Pupils equal, round, and reactive to light. Conjunctivae clear. Neck: Supple, with full range of motion. No jugular venous distention. Trachea midline. Respiratory:  Normal respiratory effort. Decreased air entry on the right lower lung, clear to auscultation bilaterally without Rales/Wheezes/Rhonchi. Cardiovascular: Regular rate and rhythm with normal S1/S2 without murmurs, rubs or gallops. Abdomen: Soft, non-tender, non-distended with normal bowel sounds. Musculoskeletal: No clubbing, cyanosis or edema bilaterally. Full range of motion without deformity. Skin: Skin color, texture, turgor normal.    Neurologic:  Neurovascularly intact without any focal sensory/motor deficits. Cranial nerves: II-XII intact, grossly non-focal.  Psychiatric: Alert and oriented, thought content appropriate, normal insight  Capillary Refill: Brisk,3 seconds, normal   Peripheral Pulses: +2 palpable, equal bilaterally     Perineum: Diffuse erythema and swelling with ulceration, and severe tenderness.     Labs:   Recent Labs     08/07/22 2004 08/08/22 0333 08/08/22 0618   WBC 7.8  --  7.8   HGB 6.0* 7.2* 6.9*   HCT 19.0* 23.1* 21.6*   PLT 57*  --  53*     Recent Labs     08/07/22 2004 08/08/22 0618    135*   K 3.7 3.3*    99   CO2 28 29   BUN 24* 18   CREATININE 0.8 0.6   CALCIUM 7.1* 7.0*     Recent Labs     08/07/22 2004 08/08/22 0618   AST 22 18   ALT 20 17   BILIDIR  --  <0.2   BILITOT <0.2 0.3   ALKPHOS 84 78 Recent Labs     08/07/22 2004 08/08/22 0618   INR 1.18* 1.15*     Recent Labs     08/07/22 2004 08/08/22 0618   CKTOTAL 174  --    TROPONINI 0.05* 0.04*       Urinalysis:      Lab Results   Component Value Date/Time    NITRU Negative 08/07/2022 08:04 PM    WBCUA 21-50 08/07/2022 08:04 PM    BACTERIA 2+ 08/07/2022 08:04 PM    RBCUA 21-50 08/07/2022 08:04 PM    BLOODU LARGE 08/07/2022 08:04 PM    SPECGRAV 1.020 08/07/2022 08:04 PM    GLUCOSEU Negative 08/07/2022 08:04 PM       Radiology:  CT ABDOMEN PELVIS W IV CONTRAST Additional Contrast? None   Final Result   1. Redemonstration of a large exophytic mass arising from the right vulva   consistent with the patient's known malignancy. Given the irregular   exophytic appearance of the lesion, there may be secondary ulceration and   infection. 2. New moderate-sized loculated right pleural effusion concerning for a   malignant effusion. 3. Interval development of anasarca. XR CHEST PORTABLE   Final Result   Moderate right pleural effusion and consolidation of the right lower lobe   concerning for a right lower lobe pneumonia. Assessment/Plan:    Active Hospital Problems    Diagnosis     Immunodeficiency due to chemotherapy [facility]) [O56.826, T45.1X5A, Z79.899]      Priority: Medium    Malignant pleural effusion [J91.0]      Priority: Medium    Bilateral lower leg cellulitis [L03.116, L55.498]      Priority: Medium    Malnutrition ([address] [E46]      Priority: Medium    Overweight (BMI 25.0-29. 9) [E66.3]      Priority: Medium    Hypoxia [R09.02]      Priority: Medium    Vulvar cancer ([address] [C51.9]      Priority: Medium    Cancer related pain [G89.3]      Priority: Medium    Acute radiation-induced dermatitis [L58.0]      Priority: Medium    Leg swelling [M79.89]      Priority: Medium    Anemia [D64.9]      Priority: Medium    Thrombocytopenia ([address] [D69.6]      Priority: Medium    Failure to thrive in adult [R62.7]      Priority: Medium Frequent falls [R29.6]      Priority: Medium    Pleural effusion, right [J90]      Priority: Medium    Severe malnutrition (HCC) [E43]      Priority: Medium    General weakness [R53.1]      Priority: Medium    Type 2 diabetes mellitus with diabetic neuropathy [E11.40]        Vulvar cancer: Followed outpatient by Dr. Janay Caceres. Completed cisplatin on 7/28/2022. Radiation completed on 7/29/2022, last 3 doses held. Right-sided pleural effusion:  Pulm consulted: s/p bedside thoracentesis 8/8/2022 with 900 cc of blood tinged fluid aspirated. Pleural fluid sent for cytology chemistry and cultures. Continue supplemental oxygen. Anemia and thrombocytopenia due to chemo radiation:  Transfused 2 unit PRBCs. No overt bleeding noted. On iron supplementation. LDH, haptoglobin, iron studies B12 and folate pending. Perineal wound: Due to radiation. Wound care and ID consulted. Continue pain control and local wound care. ID recommended vancomycin, Flagyl and cefepime. Palliative care consulted: Patient declined any further treatment including chemoradiation, additional tests or procedures. Patient agreeable to home with hospice. A referral has been [facility] to [facility]. DVT Prophylaxis: None   Diet: ADULT DIET; Regular  Code Status: DNR-CC    PT/OT Eval Status: Hospice    Dispo -Home with hospice pending equipment delivery.     Cedric Pagan MD

## 2022-08-09 NOTE — PROGRESS NOTES
Transport here for pt to take to Hospice. Pt with all belongings. Discharge instructions with transport team. Pt transferred to [facility] [place], denies needs.

## 2022-08-16 ENCOUNTER — TELEPHONE (OUTPATIENT)
Dept: FAMILY MEDICINE CLINIC | Age: 67
End: 2022-08-16

## 2022-08-17 ENCOUNTER — TELEPHONE (OUTPATIENT)
Dept: FAMILY MEDICINE CLINIC | Age: 67
End: 2022-08-17

## 2022-08-19 LAB — PTH RELATED PEPTIDE: 5.1 PMOL/L (ref 0–3.4)

## (undated) DEVICE — INTENDED FOR TISSUE SEPARATION, AND OTHER PROCEDURES THAT REQUIRE A SHARP SURGICAL BLADE TO PUNCTURE OR CUT.: Brand: BARD-PARKER ® STAINLESS STEEL BLADES

## (undated) DEVICE — PROVE COVER: Brand: UNBRANDED

## (undated) DEVICE — NEEDLE HYPO 25GA L1.5IN BVL ORIENTED ECLIPSE

## (undated) DEVICE — DECANTER BAG 9": Brand: MEDLINE INDUSTRIES, INC.

## (undated) DEVICE — STRIP,CLOSURE,WOUND,MEDI-STRIP,1/2X4: Brand: MEDLINE

## (undated) DEVICE — CANISTER, RIGID, 1200CC: Brand: MEDLINE INDUSTRIES, INC.

## (undated) DEVICE — SYRINGE MED 10ML LUERLOCK TIP W/O SFTY DISP

## (undated) DEVICE — C-ARM: Brand: UNBRANDED

## (undated) DEVICE — 3M™ STERI-STRIP™ COMPOUND BENZOIN TINCTURE 40 BAGS/CARTON 4 CARTONS/CASE C1544: Brand: 3M™ STERI-STRIP™

## (undated) DEVICE — 3M™ TEGADERM™ TRANSPARENT FILM DRESSING FRAME STYLE, 1626W, 4 IN X 4-3/4 IN (10 CM X 12 CM), 50/CT 4CT/CASE: Brand: 3M™ TEGADERM™

## (undated) DEVICE — CLEANER,CAUTERY TIP,2X2",STERILE: Brand: MEDLINE

## (undated) DEVICE — MINOR SET UP: Brand: MEDLINE INDUSTRIES, INC.

## (undated) DEVICE — SOLUTION IV 1000ML 0.9% SOD CHL PH 5 INJ USP VIAFLX PLAS

## (undated) DEVICE — GAUZE,SPONGE,2"X2",8PLY,STERILE,LF,2'S: Brand: MEDLINE

## (undated) DEVICE — SUTURE VCRL + SZ 3-0 L27IN ABSRB UD L26MM SH 1/2 CIR VCP416H

## (undated) DEVICE — DRAPE,T,LAPARO,TRANS,STERILE: Brand: MEDLINE

## (undated) DEVICE — SUTURE VCRL + SZ 4-0 L18IN ABSRB UD L19MM PS-2 3/8 CIR PRIM VCP496H

## (undated) DEVICE — SYRINGE TB 1ML NDL 27GA L0.5IN PLAS W/ SFTY LOK PERM NDL

## (undated) DEVICE — 48" PROBE COVER W/GEL, ULTRASOUND, STERILE: Brand: SITE-RITE

## (undated) DEVICE — SYRINGE MED 5ML STD CLR PLAS N CTRL SLIP TIP DISP

## (undated) DEVICE — GLOVE ORANGE PI 7   MSG9070